# Patient Record
Sex: MALE | Race: WHITE | NOT HISPANIC OR LATINO
[De-identification: names, ages, dates, MRNs, and addresses within clinical notes are randomized per-mention and may not be internally consistent; named-entity substitution may affect disease eponyms.]

---

## 2017-01-10 ENCOUNTER — APPOINTMENT (OUTPATIENT)
Dept: FAMILY MEDICINE | Facility: CLINIC | Age: 62
End: 2017-01-10

## 2017-01-10 VITALS — DIASTOLIC BLOOD PRESSURE: 77 MMHG | TEMPERATURE: 97.7 F | SYSTOLIC BLOOD PRESSURE: 128 MMHG

## 2017-01-10 DIAGNOSIS — L29.8 OTHER PRURITUS: ICD-10-CM

## 2017-01-10 RX ORDER — TOLNAFTATE 10 MG/G
1 POWDER TOPICAL
Qty: 1 | Refills: 0 | Status: ACTIVE | COMMUNITY
Start: 2017-01-10 | End: 1900-01-01

## 2017-01-10 RX ORDER — EMOLLIENT COMBINATION NO.112
CREAM (GRAM) TOPICAL
Qty: 1 | Refills: 3 | Status: ACTIVE | COMMUNITY
Start: 2017-01-10 | End: 1900-01-01

## 2017-04-26 ENCOUNTER — EMERGENCY (EMERGENCY)
Facility: HOSPITAL | Age: 62
LOS: 1 days | Discharge: HOME | End: 2017-04-26
Attending: EMERGENCY MEDICINE
Payer: MEDICARE

## 2017-04-26 VITALS
TEMPERATURE: 99 F | HEIGHT: 69 IN | RESPIRATION RATE: 18 BRPM | HEART RATE: 71 BPM | OXYGEN SATURATION: 98 % | WEIGHT: 240.08 LBS | DIASTOLIC BLOOD PRESSURE: 58 MMHG | SYSTOLIC BLOOD PRESSURE: 97 MMHG

## 2017-04-26 VITALS
RESPIRATION RATE: 17 BRPM | HEART RATE: 68 BPM | DIASTOLIC BLOOD PRESSURE: 62 MMHG | OXYGEN SATURATION: 96 % | TEMPERATURE: 98 F | SYSTOLIC BLOOD PRESSURE: 101 MMHG

## 2017-04-26 DIAGNOSIS — F31.9 BIPOLAR DISORDER, UNSPECIFIED: ICD-10-CM

## 2017-04-26 DIAGNOSIS — Z79.4 LONG TERM (CURRENT) USE OF INSULIN: ICD-10-CM

## 2017-04-26 DIAGNOSIS — K59.00 CONSTIPATION, UNSPECIFIED: ICD-10-CM
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
## 2017-04-26 DIAGNOSIS — Z98.890 OTHER SPECIFIED POSTPROCEDURAL STATES: Chronic | ICD-10-CM

## 2017-04-26 DIAGNOSIS — S80.00XA CONTUSION OF UNSPECIFIED KNEE, INITIAL ENCOUNTER: ICD-10-CM
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
## 2017-04-26 DIAGNOSIS — E11.9 TYPE 2 DIABETES MELLITUS WITHOUT COMPLICATIONS: ICD-10-CM

## 2017-04-26 DIAGNOSIS — R10.9 UNSPECIFIED ABDOMINAL PAIN: ICD-10-CM

## 2017-04-26 PROCEDURE — 99284 EMERGENCY DEPT VISIT MOD MDM: CPT

## 2017-04-26 PROCEDURE — 74000: CPT | Mod: 26

## 2017-04-26 PROCEDURE — 73562 X-RAY EXAM OF KNEE 3: CPT | Mod: 26,RT

## 2017-04-26 RX ORDER — POLYETHYLENE GLYCOL 3350 17 G/17G
17 POWDER, FOR SOLUTION ORAL
Qty: 119 | Refills: 0
Start: 2017-04-26 | End: 2017-05-03

## 2017-04-26 RX ADMIN — Medication 500 MILLIGRAM(S): at 15:05

## 2017-04-26 NOTE — ED PROVIDER NOTE - MEDICAL DECISION MAKING DETAILS
pt noted constipation on XR abd to dc with follow up with PMD in 2-3 days, given miralax. Contusion of knee likely but neg exam.

## 2017-04-26 NOTE — ED ADULT NURSE NOTE - OBJECTIVE STATEMENT
received pt from [facility] fall 2 years ago c/o pain to the rt knee need to see an orthopedaic doctor

## 2017-04-26 NOTE — ED PROVIDER NOTE - OBJECTIVE STATEMENT
61 year old male without significant PMH presenting to ED due to 61 year old male without significant PMH presenting to ED due to near fall with R knee hitting ground while at [employer], pt also states he has been constipated without abdominal pain but states has not had significant nausea/vomiting or pain. R knee painful after fall otherwise no LOC no head trauma.

## 2017-06-02 ENCOUNTER — RX RENEWAL (OUTPATIENT)
Age: 62
End: 2017-06-02

## 2017-06-05 ENCOUNTER — APPOINTMENT (OUTPATIENT)
Dept: FAMILY MEDICINE | Facility: CLINIC | Age: 62
End: 2017-06-05

## 2017-06-05 VITALS — TEMPERATURE: 98.3 F | DIASTOLIC BLOOD PRESSURE: 76 MMHG | SYSTOLIC BLOOD PRESSURE: 110 MMHG

## 2017-07-12 ENCOUNTER — EMERGENCY (EMERGENCY)
Facility: HOSPITAL | Age: 62
LOS: 1 days | Discharge: HOME | End: 2017-07-12
Attending: STUDENT IN AN ORGANIZED HEALTH CARE EDUCATION/TRAINING PROGRAM | Admitting: INTERNAL MEDICINE
Payer: MEDICARE

## 2017-07-12 VITALS
TEMPERATURE: 97 F | HEART RATE: 75 BPM | OXYGEN SATURATION: 99 % | SYSTOLIC BLOOD PRESSURE: 118 MMHG | RESPIRATION RATE: 18 BRPM | DIASTOLIC BLOOD PRESSURE: 74 MMHG

## 2017-07-12 DIAGNOSIS — F31.9 BIPOLAR DISORDER, UNSPECIFIED: ICD-10-CM

## 2017-07-12 DIAGNOSIS — E11.9 TYPE 2 DIABETES MELLITUS WITHOUT COMPLICATIONS: ICD-10-CM

## 2017-07-12 DIAGNOSIS — R00.2 PALPITATIONS: ICD-10-CM

## 2017-07-12 DIAGNOSIS — R47.81 SLURRED SPEECH: ICD-10-CM

## 2017-07-12 DIAGNOSIS — G45.9 TRANSIENT CEREBRAL ISCHEMIC ATTACK, UNSPECIFIED: ICD-10-CM

## 2017-07-12 DIAGNOSIS — I63.9 CEREBRAL INFARCTION, UNSPECIFIED: ICD-10-CM

## 2017-07-12 DIAGNOSIS — Z98.890 OTHER SPECIFIED POSTPROCEDURAL STATES: Chronic | ICD-10-CM

## 2017-07-12 LAB
ALBUMIN SERPL ELPH-MCNC: 3.2 G/DL — LOW (ref 3.3–5)
ALP SERPL-CCNC: 75 U/L — SIGNIFICANT CHANGE UP (ref 40–120)
ALT FLD-CCNC: 20 U/L — SIGNIFICANT CHANGE UP (ref 12–78)
ANION GAP SERPL CALC-SCNC: 6 MMOL/L — SIGNIFICANT CHANGE UP (ref 5–17)
APTT BLD: 32.9 SEC — SIGNIFICANT CHANGE UP (ref 27.5–37.4)
AST SERPL-CCNC: 9 U/L — LOW (ref 15–37)
BASOPHILS # BLD AUTO: 0.1 K/UL — SIGNIFICANT CHANGE UP (ref 0–0.2)
BASOPHILS NFR BLD AUTO: 0.5 % — SIGNIFICANT CHANGE UP (ref 0–2)
BILIRUB SERPL-MCNC: 0.4 MG/DL — SIGNIFICANT CHANGE UP (ref 0.2–1.2)
BUN SERPL-MCNC: 16 MG/DL — SIGNIFICANT CHANGE UP (ref 7–23)
CALCIUM SERPL-MCNC: 8.9 MG/DL — SIGNIFICANT CHANGE UP (ref 8.5–10.1)
CHLORIDE SERPL-SCNC: 109 MMOL/L — HIGH (ref 96–108)
CK MB BLD-MCNC: 3.8 % — HIGH (ref 0–3.5)
CK MB CFR SERPL CALC: 1.4 NG/ML — SIGNIFICANT CHANGE UP (ref 0.5–3.6)
CK SERPL-CCNC: 37 U/L — SIGNIFICANT CHANGE UP (ref 26–308)
CO2 SERPL-SCNC: 27 MMOL/L — SIGNIFICANT CHANGE UP (ref 22–31)
CREAT SERPL-MCNC: 1.05 MG/DL — SIGNIFICANT CHANGE UP (ref 0.5–1.3)
EOSINOPHIL # BLD AUTO: 0.4 K/UL — SIGNIFICANT CHANGE UP (ref 0–0.5)
EOSINOPHIL NFR BLD AUTO: 3.4 % — SIGNIFICANT CHANGE UP (ref 0–6)
GLUCOSE SERPL-MCNC: 83 MG/DL — SIGNIFICANT CHANGE UP (ref 70–99)
HCT VFR BLD CALC: 39.5 % — SIGNIFICANT CHANGE UP (ref 39–50)
HGB BLD-MCNC: 14.2 G/DL — SIGNIFICANT CHANGE UP (ref 13–17)
INR BLD: 1.24 RATIO — HIGH (ref 0.88–1.16)
LACTATE SERPL-SCNC: 1.5 MMOL/L — SIGNIFICANT CHANGE UP (ref 0.7–2)
LYMPHOCYTES # BLD AUTO: 2.5 K/UL — SIGNIFICANT CHANGE UP (ref 1–3.3)
LYMPHOCYTES # BLD AUTO: 24.5 % — SIGNIFICANT CHANGE UP (ref 13–44)
MCHC RBC-ENTMCNC: 29.8 PG — SIGNIFICANT CHANGE UP (ref 27–34)
MCHC RBC-ENTMCNC: 35.8 GM/DL — SIGNIFICANT CHANGE UP (ref 32–36)
MCV RBC AUTO: 83.1 FL — SIGNIFICANT CHANGE UP (ref 80–100)
MONOCYTES # BLD AUTO: 0.7 K/UL — SIGNIFICANT CHANGE UP (ref 0–0.9)
MONOCYTES NFR BLD AUTO: 6.7 % — SIGNIFICANT CHANGE UP (ref 2–14)
NEUTROPHILS # BLD AUTO: 6.7 K/UL — SIGNIFICANT CHANGE UP (ref 1.8–7.4)
NEUTROPHILS NFR BLD AUTO: 64.8 % — SIGNIFICANT CHANGE UP (ref 43–77)
PLATELET # BLD AUTO: 245 K/UL — SIGNIFICANT CHANGE UP (ref 150–400)
POTASSIUM SERPL-MCNC: 3.9 MMOL/L — SIGNIFICANT CHANGE UP (ref 3.5–5.3)
POTASSIUM SERPL-SCNC: 3.9 MMOL/L — SIGNIFICANT CHANGE UP (ref 3.5–5.3)
PROT SERPL-MCNC: 7.2 GM/DL — SIGNIFICANT CHANGE UP (ref 6–8.3)
PROTHROM AB SERPL-ACNC: 13.6 SEC — HIGH (ref 9.8–12.7)
RBC # BLD: 4.76 M/UL — SIGNIFICANT CHANGE UP (ref 4.2–5.8)
RBC # FLD: 13.1 % — SIGNIFICANT CHANGE UP (ref 11–15)
SODIUM SERPL-SCNC: 142 MMOL/L — SIGNIFICANT CHANGE UP (ref 135–145)
TROPONIN I SERPL-MCNC: <.015 NG/ML — SIGNIFICANT CHANGE UP (ref 0.01–0.04)
WBC # BLD: 10.4 K/UL — SIGNIFICANT CHANGE UP (ref 3.8–10.5)
WBC # FLD AUTO: 10.4 K/UL — SIGNIFICANT CHANGE UP (ref 3.8–10.5)

## 2017-07-12 PROCEDURE — 70450 CT HEAD/BRAIN W/O DYE: CPT | Mod: 26

## 2017-07-12 PROCEDURE — 99222 1ST HOSP IP/OBS MODERATE 55: CPT | Mod: AI

## 2017-07-12 PROCEDURE — 71010: CPT | Mod: 26

## 2017-07-12 PROCEDURE — 99285 EMERGENCY DEPT VISIT HI MDM: CPT

## 2017-07-12 RX ORDER — SODIUM CHLORIDE 9 MG/ML
500 INJECTION INTRAMUSCULAR; INTRAVENOUS; SUBCUTANEOUS ONCE
Qty: 0 | Refills: 0 | Status: COMPLETED | OUTPATIENT
Start: 2017-07-12 | End: 2017-07-12

## 2017-07-12 RX ORDER — ENOXAPARIN SODIUM 100 MG/ML
40 INJECTION SUBCUTANEOUS DAILY
Qty: 0 | Refills: 0 | Status: DISCONTINUED | OUTPATIENT
Start: 2017-07-12 | End: 2017-07-13

## 2017-07-12 RX ORDER — POLYETHYLENE GLYCOL 3350 17 G/17G
17 POWDER, FOR SOLUTION ORAL DAILY
Qty: 0 | Refills: 0 | Status: DISCONTINUED | OUTPATIENT
Start: 2017-07-12 | End: 2017-07-13

## 2017-07-12 RX ORDER — INSULIN GLARGINE 100 [IU]/ML
20 INJECTION, SOLUTION SUBCUTANEOUS AT BEDTIME
Qty: 0 | Refills: 0 | Status: DISCONTINUED | OUTPATIENT
Start: 2017-07-12 | End: 2017-07-13

## 2017-07-12 RX ORDER — INSULIN LISPRO 100/ML
VIAL (ML) SUBCUTANEOUS
Qty: 0 | Refills: 0 | Status: DISCONTINUED | OUTPATIENT
Start: 2017-07-12 | End: 2017-07-13

## 2017-07-12 RX ORDER — ASPIRIN/CALCIUM CARB/MAGNESIUM 324 MG
81 TABLET ORAL DAILY
Qty: 0 | Refills: 0 | Status: DISCONTINUED | OUTPATIENT
Start: 2017-07-12 | End: 2017-07-13

## 2017-07-12 RX ORDER — DEXTROSE 50 % IN WATER 50 %
12.5 SYRINGE (ML) INTRAVENOUS ONCE
Qty: 0 | Refills: 0 | Status: DISCONTINUED | OUTPATIENT
Start: 2017-07-12 | End: 2017-07-13

## 2017-07-12 RX ORDER — QUETIAPINE FUMARATE 200 MG/1
300 TABLET, FILM COATED ORAL AT BEDTIME
Qty: 0 | Refills: 0 | Status: DISCONTINUED | OUTPATIENT
Start: 2017-07-12 | End: 2017-07-13

## 2017-07-12 RX ORDER — DEXTROSE 50 % IN WATER 50 %
25 SYRINGE (ML) INTRAVENOUS ONCE
Qty: 0 | Refills: 0 | Status: DISCONTINUED | OUTPATIENT
Start: 2017-07-12 | End: 2017-07-13

## 2017-07-12 RX ORDER — DEXTROSE 50 % IN WATER 50 %
1 SYRINGE (ML) INTRAVENOUS ONCE
Qty: 0 | Refills: 0 | Status: DISCONTINUED | OUTPATIENT
Start: 2017-07-12 | End: 2017-07-13

## 2017-07-12 RX ORDER — GLUCAGON INJECTION, SOLUTION 0.5 MG/.1ML
1 INJECTION, SOLUTION SUBCUTANEOUS ONCE
Qty: 0 | Refills: 0 | Status: DISCONTINUED | OUTPATIENT
Start: 2017-07-12 | End: 2017-07-13

## 2017-07-12 RX ORDER — LITHIUM CARBONATE 300 MG/1
600 TABLET, EXTENDED RELEASE ORAL DAILY
Qty: 0 | Refills: 0 | Status: DISCONTINUED | OUTPATIENT
Start: 2017-07-12 | End: 2017-07-13

## 2017-07-12 RX ORDER — SODIUM CHLORIDE 9 MG/ML
1000 INJECTION, SOLUTION INTRAVENOUS
Qty: 0 | Refills: 0 | Status: DISCONTINUED | OUTPATIENT
Start: 2017-07-12 | End: 2017-07-13

## 2017-07-12 RX ORDER — INSULIN LISPRO 100/ML
VIAL (ML) SUBCUTANEOUS AT BEDTIME
Qty: 0 | Refills: 0 | Status: DISCONTINUED | OUTPATIENT
Start: 2017-07-12 | End: 2017-07-13

## 2017-07-12 RX ORDER — HALOPERIDOL DECANOATE 100 MG/ML
10 INJECTION INTRAMUSCULAR DAILY
Qty: 0 | Refills: 0 | Status: DISCONTINUED | OUTPATIENT
Start: 2017-07-12 | End: 2017-07-13

## 2017-07-12 RX ADMIN — QUETIAPINE FUMARATE 300 MILLIGRAM(S): 200 TABLET, FILM COATED ORAL at 22:00

## 2017-07-12 RX ADMIN — SODIUM CHLORIDE 1000 MILLILITER(S): 9 INJECTION INTRAMUSCULAR; INTRAVENOUS; SUBCUTANEOUS at 15:00

## 2017-07-12 NOTE — DISCHARGE NOTE ADULT - NS AS DC STROKE ED MATERIALS
Risk Factors for Stroke/Prescribed Medications/Stroke Education Booklet/Need for Followup After Discharge/Stroke Warning Signs and Symptoms/Call 911 for Stroke

## 2017-07-12 NOTE — DISCHARGE NOTE ADULT - SECONDARY DIAGNOSIS.
Type 2 diabetes mellitus without complication, with long-term current use of insulin Bipolar affective disorder

## 2017-07-12 NOTE — ED ADULT TRIAGE NOTE - CHIEF COMPLAINT QUOTE
pt sent form [facility] day care for slurred speech and palpitations. last seen normal 1325. pt has history of dm, and schizophrenia pt sent form [facility] day care for slurred speech and palpitations. last seen normal 1325. pt has history of dm, and schizophrenia.

## 2017-07-12 NOTE — ED ADULT NURSE NOTE - CHIEF COMPLAINT QUOTE
pt sent form [facility] day care for slurred speech and palpitations. last seen normal 1325. pt has history of dm, and schizophrenia.

## 2017-07-12 NOTE — ED ADULT NURSE REASSESSMENT NOTE - NS ED NURSE REASSESS COMMENT FT1
pt. speech improving, pt asking for mother, pt asked to call mother, I called his sister at [facility].. pt aware.
Dr. Marino at bedside, pt denies pain. Dr. Marino aware of pts bp

## 2017-07-12 NOTE — H&P ADULT - NSHPREVIEWOFSYSTEMS_GEN_ALL_CORE

## 2017-07-12 NOTE — DISCHARGE NOTE ADULT - CARE PLAN
Principal Discharge DX:	Transient cerebral ischemia, unspecified type  Goal:	resolved  Instructions for follow-up, activity and diet:	Follow up with your physician  Secondary Diagnosis:	Type 2 diabetes mellitus without complication, with long-term current use of insulin  Secondary Diagnosis:	Bipolar affective disorder

## 2017-07-12 NOTE — ED PROVIDER NOTE - MEDICAL DECISION MAKING DETAILS
labs, ekg, xray chest and ct head, pt was not a tpa candidate due to time of onset, symptoms also improved

## 2017-07-12 NOTE — DISCHARGE NOTE ADULT - PATIENT PORTAL LINK FT
“You can access the [facility] Patient Portal, offered by [facility], by registering with the following website: [email or web address]”

## 2017-07-12 NOTE — H&P ADULT - HISTORY OF PRESENT ILLNESS
this is a 61 years old male with history of type 2 diabetes and bipolar disorder complain of  slurred speech and left upper extremity weakness  for one day - patient poor historian and changes his story often .

## 2017-07-12 NOTE — H&P ADULT - NSHPLABSRESULTS_GEN_ALL_CORE
14.2   10.4  )-----------( 245      ( 12 Jul 2017 14:43 )             39.5   07-12    142  |  109<H>  |  16  ----------------------------<  83  3.9   |  27  |  1.05    Ca    8.9      12 Jul 2017 14:43    TPro  7.2  /  Alb  3.2<L>  /  TBili  0.4  /  DBili  x   /  AST  9<L>  /  ALT  20  /  AlkPhos  75  07-12  < from: CT Head No Cont (07.12.17 @ 14:21) >    IMPRESSION:    1)  no acute infarct or hemorrhagic lesion identified. Follow-up MR   imaging may be considered for further assessment. No intraluminal   thrombus appreciated.  2)  clear sinuses and mastoids    < from: Xray Chest 1 View AP/PA. (07.12.17 @ 14:25) >    MPRESSION:    Cardiomegaly with congestive changes.      < end of copied text >

## 2017-07-12 NOTE — ED PROVIDER NOTE - PROGRESS NOTE DETAILS
dr destini hawkins, made aware, [facility] day [facility] called to get more information of the time of onset spoke to the nurse in [facility] day care, pt was last seen normal by her at 11:30am, she went to go check up on him again at 1:25pm and noticed his speech was slurred pt re-assessed, pt's speech is clear and moving all extremities, pt is not a candiate for tpa

## 2017-07-12 NOTE — ED PROVIDER NOTE - OBJECTIVE STATEMENT
61 year old male with 61 year old male with h/o diabetes and bipolar disorder presents today sent in from [facility] day program for evaluation of stroke symptoms, pt was last seen normal by his nurse at 11:30am, he was then seen by his nurse again at 1:25pm and found to have slurring of his speech, pt c/o feeling palpitations (cardiac monitor in the ER showed a heart rate in triage of 74 bpm), he denies chest pain (-) headache (-) nausea or vomiting (-) dizzy or lightheaded (-) visual disturbances

## 2017-07-12 NOTE — DISCHARGE NOTE ADULT - MEDICATION SUMMARY - MEDICATIONS TO TAKE
I will START or STAY ON the medications listed below when I get home from the hospital:    naproxen 375 mg oral tablet  -- 1 tab(s) by mouth 2 times a day  -- Check with your doctor before becoming pregnant.  It is very important that you take or use this exactly as directed.  Do not skip doses or discontinue unless directed by your doctor.  May cause drowsiness or dizziness.  Obtain medical advice before taking any non-prescription drugs as some may affect the action of this medication.  Take with food or milk.    -- Indication: For arthritis    aspirin 81 mg oral delayed release tablet  -- 1 tab(s) by mouth once a day  -- Indication: For TIA (transient ischemic attack)    Lantus 100 units/mL subcutaneous solution  --  subcutaneous   -- Indication: For DM2 (diabetes mellitus, type 2)    lithium 600 mg oral capsule  --  by mouth once a day  -- Indication: For Bipolar 1 disorder    SEROquel 300 mg oral tablet  --  by mouth once a day  -- Indication: For Bipolar 1 disorder    haloperidol 10 mg oral tablet  -- 1 tab(s) by mouth once a day  -- Indication: For Bipolar 1 disorder    MiraLax - oral powder for reconstitution  -- 17 gram(s) by mouth once a day  -- Dilute this medication with liquid before administration.  It is very important that you take or use this exactly as directed.  Do not skip doses or discontinue unless directed by your doctor.    -- Indication: For Constipation I will START or STAY ON the medications listed below when I get home from the hospital:    naproxen 375 mg oral tablet  -- 1 tab(s) by mouth 2 times a day  -- Check with your doctor before becoming pregnant.  It is very important that you take or use this exactly as directed.  Do not skip doses or discontinue unless directed by your doctor.  May cause drowsiness or dizziness.  Obtain medical advice before taking any non-prescription drugs as some may affect the action of this medication.  Take with food or milk.    -- Indication: For arthritis    aspirin 81 mg oral delayed release tablet  -- 1 tab(s) by mouth once a day  -- Indication: For TIA (transient ischemic attack)    Lantus 100 units/mL subcutaneous solution  --  subcutaneous   -- Indication: For DM2 (diabetes mellitus, type 2)    simvastatin 40 mg oral tablet  -- 1 tab(s) by mouth once a day (at bedtime)  -- Avoid grapefruit and grapefruit juice while taking this medication.  Do not take this drug if you are pregnant.  It is very important that you take or use this exactly as directed.  Do not skip doses or discontinue unless directed by your doctor.  Obtain medical advice before taking any non-prescription drugs as some may affect the action of this medication.  Take with food or milk.    -- Indication: For Transient cerebral ischemia, unspecified type    haloperidol 10 mg oral tablet  -- 1 tab(s) by mouth once a day  -- Indication: For Bipolar 1 disorder    lithium 600 mg oral capsule  --  by mouth once a day  -- Indication: For Bipolar 1 disorder    SEROquel 300 mg oral tablet  --  by mouth once a day  -- Indication: For Bipolar 1 disorder    MiraLax - oral powder for reconstitution  -- 17 gram(s) by mouth once a day  -- Dilute this medication with liquid before administration.  It is very important that you take or use this exactly as directed.  Do not skip doses or discontinue unless directed by your doctor.    -- Indication: For Constipation

## 2017-07-12 NOTE — H&P ADULT - NSHPPHYSICALEXAM_GEN_ALL_CORE
GENERAL: NAD well-developed  HEAD:  Atraumatic, Normocephalic  EYES: EOMI, PERRLA, conjunctiva and sclera clear  ENMT: No tonsillar erythema, exudates, or enlargement; Moist mucous membranes, Good dentition, No lesions  NECK: Supple, No JVD, Normal thyroid  NERVOUS SYSTEM:  Alert & Oriented X3,slurred speech with lue 3/5 weakness   CHEST/LUNG: Clear to percussion bilaterally; No rales, rhonchi, wheezing, or rubs  HEART: Regular rate and rhythm; No murmurs, rubs, or gallops  ABDOMEN: Soft, Nontender, Nondistended; Bowel sounds present  EXTREMITIES:  2+ Peripheral Pulses, No clubbing, cyanosis, or edema  LYMPH: No lymphadenopathy   SKIN: No rashes or lesions

## 2017-07-12 NOTE — DISCHARGE NOTE ADULT - HOSPITAL COURSE
his is a 61 years old male with history of type 2 diabetes and bipolar disorder complain of  slurred speech and left upper extremity weakness  for one day -pt  had a head ct and mri which were both NEGATIVE . patient had a carotid doppler which was within normal limit . patients clinical status went back to baseline so as all his tests were within normal limit we just added aspirin and a statin to his regimen for his tia

## 2017-07-13 VITALS
OXYGEN SATURATION: 99 % | RESPIRATION RATE: 18 BRPM | DIASTOLIC BLOOD PRESSURE: 86 MMHG | TEMPERATURE: 99 F | SYSTOLIC BLOOD PRESSURE: 115 MMHG | HEART RATE: 78 BPM

## 2017-07-13 LAB
CHOLEST SERPL-MCNC: 176 MG/DL — SIGNIFICANT CHANGE UP (ref 10–199)
HDLC SERPL-MCNC: 46 MG/DL — SIGNIFICANT CHANGE UP (ref 40–125)
LIPID PNL WITH DIRECT LDL SERPL: 106 MG/DL — SIGNIFICANT CHANGE UP
LITHIUM SERPL-MCNC: 0.7 MMOL/L — SIGNIFICANT CHANGE UP (ref 0.6–1.2)
TOTAL CHOLESTEROL/HDL RATIO MEASUREMENT: 3.8 RATIO — SIGNIFICANT CHANGE UP (ref 3.4–9.6)
TRIGL SERPL-MCNC: 119 MG/DL — SIGNIFICANT CHANGE UP (ref 10–149)

## 2017-07-13 PROCEDURE — 93306 TTE W/DOPPLER COMPLETE: CPT | Mod: 26

## 2017-07-13 PROCEDURE — 93880 EXTRACRANIAL BILAT STUDY: CPT | Mod: 26

## 2017-07-13 PROCEDURE — 93010 ELECTROCARDIOGRAM REPORT: CPT

## 2017-07-13 PROCEDURE — 70551 MRI BRAIN STEM W/O DYE: CPT | Mod: 26

## 2017-07-13 PROCEDURE — 99238 HOSP IP/OBS DSCHRG MGMT 30/<: CPT

## 2017-07-13 RX ORDER — ASPIRIN/CALCIUM CARB/MAGNESIUM 324 MG
1 TABLET ORAL
Qty: 0 | Refills: 0 | DISCHARGE
Start: 2017-07-13

## 2017-07-13 RX ORDER — HALOPERIDOL DECANOATE 100 MG/ML
1 INJECTION INTRAMUSCULAR
Qty: 0 | Refills: 0 | COMMUNITY
Start: 2017-07-13

## 2017-07-13 RX ORDER — SIMVASTATIN 20 MG/1
1 TABLET, FILM COATED ORAL
Qty: 30 | Refills: 0 | OUTPATIENT
Start: 2017-07-13 | End: 2017-08-12

## 2017-07-13 RX ADMIN — Medication 81 MILLIGRAM(S): at 12:35

## 2017-07-13 RX ADMIN — POLYETHYLENE GLYCOL 3350 17 GRAM(S): 17 POWDER, FOR SOLUTION ORAL at 12:34

## 2017-07-13 RX ADMIN — LITHIUM CARBONATE 600 MILLIGRAM(S): 300 TABLET, EXTENDED RELEASE ORAL at 12:35

## 2017-07-13 RX ADMIN — ENOXAPARIN SODIUM 40 MILLIGRAM(S): 100 INJECTION SUBCUTANEOUS at 12:35

## 2017-07-13 NOTE — OCCUPATIONAL THERAPY INITIAL EVALUATION ADULT - PLANNED THERAPY INTERVENTIONS, OT EVAL
balance training/cognitive, visual perceptual/fine motor coordination training/neuromuscular re-education/energy conservation techniques/motor coordination training/parent/caregiver training.../stretching/transfer training/IADL retraining/bed mobility training/strengthening

## 2017-07-13 NOTE — OCCUPATIONAL THERAPY INITIAL EVALUATION ADULT - GENERAL OBSERVATIONS, REHAB EVAL
Pt was encountered sitting at the edge of   bed, AA&Ox4, cooperative & followed commands. Pt moves all extremities without difficulty and incooperate both sides of his body; pt performed bed mobility, upper body ADL and transfer; pt ambulated with supervision to bathroom for toileting tasks without any adaptive device with good- balance; Dexterity and fine motor skills are diminished in both hand due to tremors Pt was encountered sitting at the edge of   bed, AA&Ox4, cooperative & followed commands. Pt moves all extremities without difficulty and incooperate both sides of his body; pt performed bed mobility, upper body ADL and transfer; pt ambulated 25 ft x2 with supervision to bathroom for toileting tasks without any adaptive device with good- balance; Dexterity and fine motor skills are diminished in both hands due to tremors

## 2017-07-13 NOTE — PROGRESS NOTE ADULT - SUBJECTIVE AND OBJECTIVE BOX
CHIEF COMPLAINT/INTERVAL HISTORY:    Patient is a 61y old  Male who presents with a chief complaint of Pt stated that his heart was beating hard. (12 Jul 2017 23:58)      HPI:  this is a 61 years old male with history of type 2 diabetes and bipolar disorder complain of  slurred speech and left upper extremity weakness  for one day - patient poor historian and changes his story often . (12 Jul 2017 19:36)    Overnight issues  all symptoms resolved   complain of constipation   SUBJECTIVE & OBJECTIVE: Pt seen and examined at bedside.   ROS:  CONSTITUTIONAL: No fever, weight loss, or fatigue  NECK: No pain or stiffness  RESPIRATORY: No cough, wheezing, chills or hemoptysis; No shortness of breath  CARDIOVASCULAR: No chest pain, palpitations, dizziness, or leg swelling  GASTROINTESTINAL: No abdominal or epigastric pain. No nausea, vomiting, or hematemesis; No diarrhea or constipation. No melena or hematochezia.  GENITOURINARY: No dysuria, frequency, hematuria, or incontinence  NEUROLOGICAL: No headaches, memory loss, loss of strength, numbness, or tremors  SKIN: No itching, burning, rashes, or lesions   ICU Vital Signs Last 24 Hrs  T(C): 37.1 (13 Jul 2017 17:20), Max: 37.1 (13 Jul 2017 17:20)  T(F): 98.8 (13 Jul 2017 17:20), Max: 98.8 (13 Jul 2017 17:20)  HR: 78 (13 Jul 2017 17:20) (61 - 83)  BP: 115/86 (13 Jul 2017 17:20) (96/56 - 120/75)  BP(mean): --  ABP: --  ABP(mean): --  RR: 18 (13 Jul 2017 17:20) (16 - 18)  SpO2: 99% (13 Jul 2017 17:20) (97% - 100%)        MEDICATIONS  (STANDING):  haloperidol     Tablet 10 milliGRAM(s) Oral daily  lithium 600 milliGRAM(s) Oral daily  QUEtiapine 300 milliGRAM(s) Oral at bedtime  polyethylene glycol 3350 17 Gram(s) Oral daily  insulin glargine Injectable (LANTUS) 20 Unit(s) SubCutaneous at bedtime  insulin lispro (HumaLOG) corrective regimen sliding scale   SubCutaneous three times a day before meals  insulin lispro (HumaLOG) corrective regimen sliding scale   SubCutaneous at bedtime  dextrose 5%. 1000 milliLiter(s) (50 mL/Hr) IV Continuous <Continuous>  dextrose 50% Injectable 12.5 Gram(s) IV Push once  dextrose 50% Injectable 25 Gram(s) IV Push once  dextrose 50% Injectable 25 Gram(s) IV Push once  aspirin enteric coated 81 milliGRAM(s) Oral daily  enoxaparin Injectable 40 milliGRAM(s) SubCutaneous daily    MEDICATIONS  (PRN):  dextrose Gel 1 Dose(s) Oral once PRN Blood Glucose LESS THAN 70 milliGRAM(s)/deciliter  glucagon  Injectable 1 milliGRAM(s) IntraMuscular once PRN Glucose LESS THAN 70 milligrams/deciliter        PHYSICAL EXAM:    GENERAL: NAD, well-groomed, well-developed  HEAD:  Atraumatic, Normocephalic  EYES: EOMI, PERRLA, conjunctiva and sclera clear  ENMT: Moist mucous membranes  NECK: Supple, No JVD  NERVOUS SYSTEM:  Alert & Oriented X3, Motor Strength 5/5 B/L upper and lower extremities; DTRs 2+ intact and symmetric  CHEST/LUNG: Clear to auscultation bilaterally; No rales, rhonchi, wheezing, or rubs  HEART: Regular rate and rhythm; No murmurs, rubs, or gallops  ABDOMEN: Soft, Nontender, Nondistended; Bowel sounds present  EXTREMITIES:  2+ Peripheral Pulses, No clubbing, cyanosis, or edema    LABS:                        14.2   10.4  )-----------( 245      ( 12 Jul 2017 14:43 )             39.5     07-12    142  |  109<H>  |  16  ----------------------------<  83  3.9   |  27  |  1.05    Ca    8.9      12 Jul 2017 14:43    TPro  7.2  /  Alb  3.2<L>  /  TBili  0.4  /  DBili  x   /  AST  9<L>  /  ALT  20  /  AlkPhos  75  07-12    PT/INR - ( 12 Jul 2017 14:43 )   PT: 13.6 sec;   INR: 1.24 ratio         PTT - ( 12 Jul 2017 14:43 )  PTT:32.9 sec      CAPILLARY BLOOD GLUCOSE  107 (13 Jul 2017 16:28)  113 (13 Jul 2017 11:29)  99 (13 Jul 2017 07:39)  109 (12 Jul 2017 21:14)          RECENT CULTURES:      RADIOLOGY & ADDITIONAL TESTS:  Imaging Personally Reviewed:  [ ] YES      Consultant(s) Notes Reviewed:  [ ] YES     Care Discussed with [ ] Consultants [X ] Patient [ ] Family  [x ]    [x ]  Other; RN  HEALTH ISSUES - PROBLEM Dx:  Bipolar 1 disorder: Bipolar 1 disorder  DM2 (diabetes mellitus, type 2): DM2 (diabetes mellitus, type 2)  Cerebrovascular accident (CVA), unspecified mechanism: Cerebrovascular accident (CVA), unspecified mechanism        DVT/GI ppx  Discussed with pt @ bedside

## 2017-07-13 NOTE — OCCUPATIONAL THERAPY INITIAL EVALUATION ADULT - LIVES WITH, PROFILE
his elderly mother grzegorz private house with 4 steps to enter  with bilateral hand raisl; pt bathroom has a walk in shower and is equipped with grab bars , shower chair , but no raised toilet seat his elderly mother in a private house with 4 steps to enter  with bilateral hand rails; pt bathroom has a walk in shower and is equipped with grab bars , shower chair , but no raised toilet seat

## 2017-07-13 NOTE — OCCUPATIONAL THERAPY INITIAL EVALUATION ADULT - PERTINENT HX OF CURRENT PROBLEM, REHAB EVAL
Pt presented to ER  due to acute onset of neurological deficit. Pt is diagnosed with TIA with slurred speech. MRI on 7/13/17 results confirm  no acute finding.

## 2017-07-13 NOTE — PHYSICAL THERAPY INITIAL EVALUATION ADULT - CRITERIA FOR SKILLED THERAPEUTIC INTERVENTIONS
NIHS Score = 0/42, no stroke symptoms. Patient appears in baseline functions on assessment of bed mobility, transfers and gait. No skilled Pt indicated./risk reduction/prevention/therapy frequency

## 2017-07-13 NOTE — PHYSICAL THERAPY INITIAL EVALUATION ADULT - GENERAL OBSERVATIONS, REHAB EVAL
Patient encountered supine in bed, vital signs as charted. AAOx4. Denies pain or shortness of breath at rest.

## 2017-07-13 NOTE — OCCUPATIONAL THERAPY INITIAL EVALUATION ADULT - ADDITIONAL COMMENTS
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  The scale below depicts a picture of the pt's current level of functioning. Barthel Index: Feeding Score__10____, Bathing Score__5____, Grooming Score___5__, Dressing Score___10__, Bowel Score__10___, Bladder Score__10____, Toilet Score_10____, Transfer Score___10___, Mobility Score___10__, Stairs Score___5__, Total Score__75/100___.

## 2017-07-13 NOTE — PHYSICAL THERAPY INITIAL EVALUATION ADULT - LEVEL OF CONSCIOUSNESS, REHAB EVAL
[facility] Cognitive Continuum Level 5 (Higher Level Cognitive Function): able to do complex problem solving but limited flexibility, insight, social behavior and endurance; susceptible to breakdwon of behavior outside of a structured setting; reserved cognitive functions in stressful situations; slow cognitive processing ./alert

## 2017-07-13 NOTE — PHYSICAL THERAPY INITIAL EVALUATION ADULT - MODIFIED CLINICAL TEST OF SENSORY INTEGRATION IN BALANCE TEST
Barthel Index: Feeding Score _10__, Bathing Score _0__, Grooming Score _10_, Dressing Score _10__, Bowels Score _10__, Bladder Score _10_, Toilet Score _10__, Transfers Score _15__, Mobility Score _15__, Stairs Score _5__,     Total Score _95__

## 2017-07-13 NOTE — PHYSICAL THERAPY INITIAL EVALUATION ADULT - PERTINENT HX OF CURRENT PROBLEM, REHAB EVAL
Patient came to Ed c report of slurred speech and left arm weakness. Chart reviewed and noted both CT and MRI head showing negative neurologic changes.

## 2017-07-13 NOTE — PHYSICAL THERAPY INITIAL EVALUATION ADULT - COORDINATION ASSESSED, REHAB EVAL
intact bilaterally on both upper and lower limbs/finger to nose/heel to shin finger to nose/heel to shin/intact bilaterally on both upper and lower limbs

## 2017-07-20 ENCOUNTER — APPOINTMENT (OUTPATIENT)
Dept: FAMILY MEDICINE | Facility: CLINIC | Age: 62
End: 2017-07-20

## 2017-07-20 VITALS — DIASTOLIC BLOOD PRESSURE: 62 MMHG | TEMPERATURE: 98.4 F | SYSTOLIC BLOOD PRESSURE: 112 MMHG

## 2017-07-20 DIAGNOSIS — L85.3 XEROSIS CUTIS: ICD-10-CM

## 2017-07-20 RX ORDER — HYDROCORTISONE 10 MG/G
1 CREAM TOPICAL
Qty: 1 | Refills: 3 | Status: ACTIVE | COMMUNITY
Start: 2017-01-10 | End: 1900-01-01

## 2017-07-24 ENCOUNTER — APPOINTMENT (OUTPATIENT)
Dept: FAMILY MEDICINE | Facility: CLINIC | Age: 62
End: 2017-07-24

## 2017-08-18 RX ORDER — HALOPERIDOL DECANOATE 100 MG/ML
1 INJECTION INTRAMUSCULAR
Qty: 30 | Refills: 0
Start: 2017-08-18 | End: 2017-09-17

## 2017-08-18 RX ORDER — QUETIAPINE FUMARATE 200 MG/1
400 TABLET, FILM COATED ORAL
Qty: 0 | Refills: 0 | DISCHARGE
Start: 2017-08-18 | End: 2017-09-17

## 2017-08-18 RX ORDER — LITHIUM CARBONATE 300 MG/1
1 TABLET, EXTENDED RELEASE ORAL
Qty: 30 | Refills: 0
Start: 2017-08-18 | End: 2017-09-17

## 2017-08-18 RX ORDER — QUETIAPINE FUMARATE 200 MG/1
1 TABLET, FILM COATED ORAL
Qty: 30 | Refills: 0
Start: 2017-08-18 | End: 2017-09-17

## 2017-08-18 RX ORDER — SIMVASTATIN 20 MG/1
1 TABLET, FILM COATED ORAL
Qty: 30 | Refills: 0
Start: 2017-08-18 | End: 2017-09-17

## 2017-09-19 ENCOUNTER — APPOINTMENT (OUTPATIENT)
Dept: UROLOGY | Facility: CLINIC | Age: 62
End: 2017-09-19
Payer: MEDICARE

## 2017-09-19 ENCOUNTER — APPOINTMENT (OUTPATIENT)
Dept: UROLOGY | Facility: CLINIC | Age: 62
End: 2017-09-19

## 2017-09-19 VITALS
DIASTOLIC BLOOD PRESSURE: 62 MMHG | HEART RATE: 88 BPM | BODY MASS INDEX: 36.83 KG/M2 | TEMPERATURE: 98 F | RESPIRATION RATE: 15 BRPM | HEIGHT: 68 IN | WEIGHT: 243 LBS | SYSTOLIC BLOOD PRESSURE: 112 MMHG

## 2017-09-19 DIAGNOSIS — N39.44 NOCTURNAL ENURESIS: ICD-10-CM

## 2017-09-19 DIAGNOSIS — G45.9 TRANSIENT CEREBRAL ISCHEMIC ATTACK, UNSPECIFIED: ICD-10-CM

## 2017-09-19 PROCEDURE — 51798 US URINE CAPACITY MEASURE: CPT

## 2017-09-19 PROCEDURE — 99203 OFFICE O/P NEW LOW 30 MIN: CPT

## 2017-10-09 PROBLEM — G45.9 TIA (TRANSIENT ISCHEMIC ATTACK): Status: ACTIVE | Noted: 2017-10-09

## 2017-11-09 ENCOUNTER — EMERGENCY (EMERGENCY)
Facility: HOSPITAL | Age: 62
LOS: 0 days | Discharge: HOME | End: 2017-11-09
Attending: EMERGENCY MEDICINE
Payer: MEDICARE

## 2017-11-09 VITALS
RESPIRATION RATE: 16 BRPM | TEMPERATURE: 98 F | SYSTOLIC BLOOD PRESSURE: 110 MMHG | OXYGEN SATURATION: 99 % | HEART RATE: 76 BPM | DIASTOLIC BLOOD PRESSURE: 70 MMHG

## 2017-11-09 VITALS
HEART RATE: 78 BPM | TEMPERATURE: 99 F | RESPIRATION RATE: 17 BRPM | HEIGHT: 68 IN | DIASTOLIC BLOOD PRESSURE: 74 MMHG | OXYGEN SATURATION: 99 % | WEIGHT: 244.93 LBS | SYSTOLIC BLOOD PRESSURE: 111 MMHG

## 2017-11-09 DIAGNOSIS — Z98.890 OTHER SPECIFIED POSTPROCEDURAL STATES: Chronic | ICD-10-CM

## 2017-11-09 PROCEDURE — 71010: CPT | Mod: 26

## 2017-11-09 PROCEDURE — 99283 EMERGENCY DEPT VISIT LOW MDM: CPT

## 2017-11-09 RX ORDER — AZITHROMYCIN 500 MG/1
500 TABLET, FILM COATED ORAL ONCE
Qty: 0 | Refills: 0 | Status: COMPLETED | OUTPATIENT
Start: 2017-11-09 | End: 2017-11-09

## 2017-11-09 RX ORDER — AZITHROMYCIN 500 MG/1
1 TABLET, FILM COATED ORAL
Qty: 5 | Refills: 0
Start: 2017-11-09 | End: 2017-11-14

## 2017-11-09 RX ADMIN — Medication 200 MILLIGRAM(S): at 11:35

## 2017-11-09 RX ADMIN — AZITHROMYCIN 500 MILLIGRAM(S): 500 TABLET, FILM COATED ORAL at 12:58

## 2017-11-09 NOTE — ED ADULT TRIAGE NOTE - CHIEF COMPLAINT QUOTE
Pt with productive cough with SOB since yesterday. Smoker . [facility]

## 2017-11-09 NOTE — ED ADULT NURSE NOTE - CHIEF COMPLAINT QUOTE
Pt with productive cough with SOB since yesterday. Smoker . [facility]

## 2017-11-09 NOTE — ED ADULT NURSE NOTE - OBJECTIVE STATEMENT
received er bed f c/o cough x 1 day non productive denies chest discomfort or sob denies fever/chills b/l rhonchi audible upon auscultation r lung fields with decreased bs noted no wheezing audible

## 2017-11-09 NOTE — ED PROVIDER NOTE - PROGRESS NOTE DETAILS
Results reported to patient--xr suspicious for infiltrate/CAP, will give danielle  Pt. reports feeling better after medicine  pt. agrees to f/u with primary care outpt.  pt. understands to return to ED if symptoms worsen; will d/c

## 2017-11-09 NOTE — ED PROVIDER NOTE - OBJECTIVE STATEMENT
61 yo M with cough for 2 days, from [facility] day care.  Pt. has no other complaints.  Feels congested.  ROS: negative for fever, headache, chest pain, shortness of breath, abd pain, nausea, vomiting, diarrhea, rash, paresthesia, and weakness.   PMH: negative; Meds: Denies; SH: Denies smoking/drinking/drug use

## 2017-11-10 DIAGNOSIS — R05 COUGH: ICD-10-CM

## 2017-11-10 DIAGNOSIS — R09.89 OTHER SPECIFIED SYMPTOMS AND SIGNS INVOLVING THE CIRCULATORY AND RESPIRATORY SYSTEMS: ICD-10-CM

## 2017-11-10 DIAGNOSIS — F31.9 BIPOLAR DISORDER, UNSPECIFIED: ICD-10-CM

## 2017-12-19 RX ORDER — LITHIUM CARBONATE 300 MG/1
0 TABLET, EXTENDED RELEASE ORAL
Qty: 0 | Refills: 0 | COMMUNITY

## 2017-12-19 RX ORDER — HALOPERIDOL DECANOATE 100 MG/ML
0 INJECTION INTRAMUSCULAR
Qty: 0 | Refills: 0 | COMMUNITY

## 2017-12-19 RX ORDER — QUETIAPINE FUMARATE 200 MG/1
0 TABLET, FILM COATED ORAL
Qty: 0 | Refills: 0 | COMMUNITY

## 2017-12-29 ENCOUNTER — NON-APPOINTMENT (OUTPATIENT)
Age: 62
End: 2017-12-29

## 2017-12-29 ENCOUNTER — APPOINTMENT (OUTPATIENT)
Dept: FAMILY MEDICINE | Facility: CLINIC | Age: 62
End: 2017-12-29
Payer: MEDICARE

## 2017-12-29 VITALS
HEART RATE: 89 BPM | SYSTOLIC BLOOD PRESSURE: 120 MMHG | WEIGHT: 252 LBS | RESPIRATION RATE: 16 BRPM | HEIGHT: 68 IN | OXYGEN SATURATION: 97 % | DIASTOLIC BLOOD PRESSURE: 80 MMHG | TEMPERATURE: 98 F | BODY MASS INDEX: 38.19 KG/M2

## 2017-12-29 DIAGNOSIS — R73.09 OTHER ABNORMAL GLUCOSE: ICD-10-CM

## 2017-12-29 PROCEDURE — G0439: CPT

## 2017-12-29 PROCEDURE — 36415 COLL VENOUS BLD VENIPUNCTURE: CPT

## 2018-02-22 LAB
ALBUMIN SERPL ELPH-MCNC: 3.8 G/DL
ALP BLD-CCNC: 86 U/L
ALT SERPL-CCNC: 19 U/L
ANION GAP SERPL CALC-SCNC: 17 MMOL/L
AST SERPL-CCNC: 12 U/L
BASOPHILS # BLD AUTO: 0.07 K/UL
BASOPHILS NFR BLD AUTO: 0.6 %
BILIRUB SERPL-MCNC: <0.2 MG/DL
BUN SERPL-MCNC: 29 MG/DL
CALCIUM SERPL-MCNC: 10.3 MG/DL
CHLORIDE SERPL-SCNC: 105 MMOL/L
CO2 SERPL-SCNC: 21 MMOL/L
CREAT SERPL-MCNC: 0.98 MG/DL
EOSINOPHIL # BLD AUTO: 0.5 K/UL
EOSINOPHIL NFR BLD AUTO: 4.6 %
ESTIMATED AVERAGE GLUCOSE: 131 MG/DL
GLUCOSE SERPL-MCNC: 105 MG/DL
HBA1C MFR BLD HPLC: 6.2 %
HCT VFR BLD CALC: 40 %
HGB BLD-MCNC: 13 G/DL
IMM GRANULOCYTES NFR BLD AUTO: 1.2 %
LITHIUM SERPL-SCNC: 0.28 MMOL/L
LYMPHOCYTES # BLD AUTO: 3.12 K/UL
LYMPHOCYTES NFR BLD AUTO: 28.9 %
MAN DIFF?: NORMAL
MCHC RBC-ENTMCNC: 27.8 PG
MCHC RBC-ENTMCNC: 32.5 GM/DL
MCV RBC AUTO: 85.7 FL
MONOCYTES # BLD AUTO: 1.01 K/UL
MONOCYTES NFR BLD AUTO: 9.4 %
NEUTROPHILS # BLD AUTO: 5.97 K/UL
NEUTROPHILS NFR BLD AUTO: 55.3 %
PLATELET # BLD AUTO: 317 K/UL
POTASSIUM SERPL-SCNC: 4.7 MMOL/L
PROT SERPL-MCNC: 7 G/DL
RBC # BLD: 4.67 M/UL
RBC # FLD: 15.2 %
SODIUM SERPL-SCNC: 143 MMOL/L
TSH SERPL-ACNC: 1.28 UIU/ML
WBC # FLD AUTO: 10.8 K/UL

## 2018-04-05 ENCOUNTER — APPOINTMENT (OUTPATIENT)
Dept: FAMILY MEDICINE | Facility: CLINIC | Age: 63
End: 2018-04-05
Payer: MEDICARE

## 2018-04-05 VITALS — SYSTOLIC BLOOD PRESSURE: 118 MMHG | TEMPERATURE: 98.3 F | DIASTOLIC BLOOD PRESSURE: 78 MMHG

## 2018-04-05 DIAGNOSIS — Z79.899 OTHER LONG TERM (CURRENT) DRUG THERAPY: ICD-10-CM

## 2018-04-05 PROCEDURE — 36415 COLL VENOUS BLD VENIPUNCTURE: CPT

## 2018-04-05 PROCEDURE — 99214 OFFICE O/P EST MOD 30 MIN: CPT | Mod: 25

## 2018-04-06 ENCOUNTER — RX RENEWAL (OUTPATIENT)
Age: 63
End: 2018-04-06

## 2018-04-12 LAB
ALBUMIN SERPL ELPH-MCNC: 4 G/DL
ALP BLD-CCNC: 73 U/L
ALT SERPL-CCNC: 22 U/L
ANION GAP SERPL CALC-SCNC: 14 MMOL/L
AST SERPL-CCNC: 15 U/L
BASOPHILS # BLD AUTO: 0.04 K/UL
BASOPHILS NFR BLD AUTO: 0.3 %
BILIRUB SERPL-MCNC: 0.2 MG/DL
BUN SERPL-MCNC: 37 MG/DL
CALCIUM SERPL-MCNC: 9.6 MG/DL
CHLORIDE SERPL-SCNC: 105 MMOL/L
CHOLEST SERPL-MCNC: 179 MG/DL
CHOLEST/HDLC SERPL: 3.1 RATIO
CO2 SERPL-SCNC: 20 MMOL/L
CREAT SERPL-MCNC: 0.97 MG/DL
EOSINOPHIL # BLD AUTO: 0.54 K/UL
EOSINOPHIL NFR BLD AUTO: 4.5 %
GLUCOSE SERPL-MCNC: 163 MG/DL
HBA1C MFR BLD HPLC: 6 %
HCT VFR BLD CALC: 39.2 %
HDLC SERPL-MCNC: 58 MG/DL
HGB BLD-MCNC: 13.3 G/DL
IMM GRANULOCYTES NFR BLD AUTO: 0.4 %
LDLC SERPL CALC-MCNC: 92 MG/DL
LITHIUM SERPL-SCNC: 0.29 MMOL/L
LYMPHOCYTES # BLD AUTO: 3.58 K/UL
LYMPHOCYTES NFR BLD AUTO: 29.9 %
MAN DIFF?: NORMAL
MCHC RBC-ENTMCNC: 28.7 PG
MCHC RBC-ENTMCNC: 33.9 GM/DL
MCV RBC AUTO: 84.5 FL
MONOCYTES # BLD AUTO: 0.86 K/UL
MONOCYTES NFR BLD AUTO: 7.2 %
NEUTROPHILS # BLD AUTO: 6.92 K/UL
NEUTROPHILS NFR BLD AUTO: 57.7 %
PLATELET # BLD AUTO: 312 K/UL
POTASSIUM SERPL-SCNC: 4.7 MMOL/L
PROT SERPL-MCNC: 6.9 G/DL
RBC # BLD: 4.64 M/UL
RBC # FLD: 16 %
SODIUM SERPL-SCNC: 139 MMOL/L
TRIGL SERPL-MCNC: 147 MG/DL
WBC # FLD AUTO: 11.99 K/UL

## 2018-06-29 ENCOUNTER — RX RENEWAL (OUTPATIENT)
Age: 63
End: 2018-06-29

## 2018-06-29 RX ORDER — QUETIAPINE FUMARATE 100 MG/1
100 TABLET ORAL DAILY
Qty: 90 | Refills: 0 | Status: ACTIVE | COMMUNITY
Start: 2018-06-29 | End: 1900-01-01

## 2018-06-29 RX ORDER — BUPROPION HYDROCHLORIDE 100 MG/1
100 TABLET, FILM COATED, EXTENDED RELEASE ORAL DAILY
Qty: 90 | Refills: 3 | Status: ACTIVE | COMMUNITY
Start: 2018-06-29 | End: 1900-01-01

## 2018-06-29 RX ORDER — QUETIAPINE FUMARATE 200 MG/1
200 TABLET ORAL
Qty: 90 | Refills: 0 | Status: ACTIVE | COMMUNITY
Start: 2018-06-29 | End: 1900-01-01

## 2018-07-17 PROBLEM — F31.9 BIPOLAR DISORDER, UNSPECIFIED: Chronic | Status: ACTIVE | Noted: 2017-11-09

## 2018-07-17 PROBLEM — F31.9 BIPOLAR DISORDER, UNSPECIFIED: Chronic | Status: ACTIVE | Noted: 2017-04-26

## 2018-07-17 PROBLEM — E11.9 TYPE 2 DIABETES MELLITUS WITHOUT COMPLICATIONS: Chronic | Status: ACTIVE | Noted: 2017-04-26

## 2018-07-25 ENCOUNTER — APPOINTMENT (OUTPATIENT)
Dept: FAMILY MEDICINE | Facility: CLINIC | Age: 63
End: 2018-07-25
Payer: MEDICARE

## 2018-07-25 VITALS — SYSTOLIC BLOOD PRESSURE: 120 MMHG | TEMPERATURE: 97.9 F | DIASTOLIC BLOOD PRESSURE: 72 MMHG

## 2018-07-25 DIAGNOSIS — R79.89 OTHER SPECIFIED ABNORMAL FINDINGS OF BLOOD CHEMISTRY: ICD-10-CM

## 2018-07-25 PROCEDURE — 36415 COLL VENOUS BLD VENIPUNCTURE: CPT

## 2018-07-25 PROCEDURE — 99214 OFFICE O/P EST MOD 30 MIN: CPT | Mod: 25

## 2018-08-21 LAB
BASOPHILS # BLD AUTO: 0.03 K/UL
BASOPHILS NFR BLD AUTO: 0.3 %
EOSINOPHIL # BLD AUTO: 0.58 K/UL
EOSINOPHIL NFR BLD AUTO: 6.3 %
HBA1C MFR BLD HPLC: 6.6 %
HCT VFR BLD CALC: 43 %
HGB BLD-MCNC: 13.4 G/DL
IMM GRANULOCYTES NFR BLD AUTO: 0.3 %
LITHIUM SERPL-SCNC: 0.36 MMOL/L
LYMPHOCYTES # BLD AUTO: 2.41 K/UL
LYMPHOCYTES NFR BLD AUTO: 26.1 %
MAN DIFF?: NORMAL
MCHC RBC-ENTMCNC: 27.5 PG
MCHC RBC-ENTMCNC: 31.2 GM/DL
MCV RBC AUTO: 88.3 FL
MONOCYTES # BLD AUTO: 0.43 K/UL
MONOCYTES NFR BLD AUTO: 4.7 %
NEUTROPHILS # BLD AUTO: 5.76 K/UL
NEUTROPHILS NFR BLD AUTO: 62.3 %
PLATELET # BLD AUTO: 290 K/UL
RBC # BLD: 4.87 M/UL
RBC # FLD: 15.8 %
WBC # FLD AUTO: 9.24 K/UL

## 2018-09-13 ENCOUNTER — RX RENEWAL (OUTPATIENT)
Age: 63
End: 2018-09-13

## 2018-09-13 ENCOUNTER — APPOINTMENT (OUTPATIENT)
Dept: FAMILY MEDICINE | Facility: CLINIC | Age: 63
End: 2018-09-13

## 2018-09-19 ENCOUNTER — APPOINTMENT (OUTPATIENT)
Dept: FAMILY MEDICINE | Facility: CLINIC | Age: 63
End: 2018-09-19
Payer: MEDICARE

## 2018-09-19 VITALS — SYSTOLIC BLOOD PRESSURE: 118 MMHG | TEMPERATURE: 98.5 F | DIASTOLIC BLOOD PRESSURE: 66 MMHG

## 2018-09-19 DIAGNOSIS — E78.5 HYPERLIPIDEMIA, UNSPECIFIED: ICD-10-CM

## 2018-09-19 DIAGNOSIS — I10 ESSENTIAL (PRIMARY) HYPERTENSION: ICD-10-CM

## 2018-09-19 PROCEDURE — 99214 OFFICE O/P EST MOD 30 MIN: CPT

## 2018-09-21 ENCOUNTER — OTHER (OUTPATIENT)
Age: 63
End: 2018-09-21

## 2018-09-21 DIAGNOSIS — R32 UNSPECIFIED URINARY INCONTINENCE: ICD-10-CM

## 2018-10-02 ENCOUNTER — OTHER (OUTPATIENT)
Age: 63
End: 2018-10-02

## 2018-10-02 ENCOUNTER — FORM ENCOUNTER (OUTPATIENT)
Age: 63
End: 2018-10-02

## 2018-10-02 RX ORDER — SENNOSIDES 8.6 MG
CAPSULE ORAL
Qty: 100 | Refills: 5 | Status: ACTIVE | OUTPATIENT
Start: 2018-10-02

## 2018-10-03 ENCOUNTER — OUTPATIENT (OUTPATIENT)
Dept: OUTPATIENT SERVICES | Facility: HOSPITAL | Age: 63
LOS: 1 days | Discharge: HOME | End: 2018-10-03
Payer: MEDICARE

## 2018-10-03 DIAGNOSIS — F31.9 BIPOLAR DISORDER, UNSPECIFIED: ICD-10-CM

## 2018-10-03 DIAGNOSIS — G64 OTHER DISORDERS OF PERIPHERAL NERVOUS SYSTEM: ICD-10-CM

## 2018-10-03 DIAGNOSIS — R26.81 UNSTEADINESS ON FEET: ICD-10-CM

## 2018-10-03 DIAGNOSIS — Z98.890 OTHER SPECIFIED POSTPROCEDURAL STATES: Chronic | ICD-10-CM

## 2018-10-03 DIAGNOSIS — E11.9 TYPE 2 DIABETES MELLITUS WITHOUT COMPLICATIONS: ICD-10-CM

## 2018-10-03 DIAGNOSIS — N40.0 BENIGN PROSTATIC HYPERPLASIA WITHOUT LOWER URINARY TRACT SYMPTOMS: ICD-10-CM

## 2018-10-03 DIAGNOSIS — R05 COUGH: ICD-10-CM

## 2018-10-03 PROCEDURE — 71046 X-RAY EXAM CHEST 2 VIEWS: CPT | Mod: 26

## 2019-02-09 ENCOUNTER — RX RENEWAL (OUTPATIENT)
Age: 64
End: 2019-02-09

## 2019-04-18 NOTE — PATIENT PROFILE ADULT. - NS PRO TALK SOMEONE YN
[address] [facility] Emergency Department  [place] [facility] [id number]  Phone: [phone]  Fax: [phone]               April 18, 2019    Patient: Any Dumont   YOB: 1981   Date of Visit: 4/18/2019       To Whom It May Concern:    Roland Maravilla was seen and treated in our emergency department on 4/18/2019. She may return to work on April 23, 2019.       Sincerely,       Milly SANCHES RN         Signature:__________________________________ no

## 2019-04-23 NOTE — ED ADULT NURSE NOTE - PAIN RATING/NUMBER SCALE (0-10): ACTIVITY
Level II US and MFM visit reviewed.  Good fetal movement.  She denies leaking of fluid, bleeding or contractions.   labor instructions understood.  She has an US and visit with MFM in 2 weeks.  Follow up in 4 weeks.   8

## 2019-05-07 ENCOUNTER — RX RENEWAL (OUTPATIENT)
Age: 64
End: 2019-05-07

## 2019-08-05 PROBLEM — L29.8 JOCK ITCH: Status: ACTIVE | Noted: 2017-01-10

## 2019-08-14 ENCOUNTER — APPOINTMENT (OUTPATIENT)
Dept: FAMILY MEDICINE | Facility: CLINIC | Age: 64
End: 2019-08-14
Payer: MEDICARE

## 2019-08-14 ENCOUNTER — NON-APPOINTMENT (OUTPATIENT)
Age: 64
End: 2019-08-14

## 2019-08-14 VITALS
HEART RATE: 74 BPM | TEMPERATURE: 98 F | OXYGEN SATURATION: 97 % | RESPIRATION RATE: 18 BRPM | BODY MASS INDEX: 36.98 KG/M2 | HEIGHT: 68 IN | WEIGHT: 244 LBS

## 2019-08-14 DIAGNOSIS — F31.9 BIPOLAR DISORDER, UNSPECIFIED: ICD-10-CM

## 2019-08-14 DIAGNOSIS — E11.9 TYPE 2 DIABETES MELLITUS W/OUT COMPLICATIONS: ICD-10-CM

## 2019-08-14 DIAGNOSIS — Z79.4 TYPE 2 DIABETES MELLITUS W/OUT COMPLICATIONS: ICD-10-CM

## 2019-08-14 PROCEDURE — G0439: CPT

## 2019-08-14 PROCEDURE — 93000 ELECTROCARDIOGRAM COMPLETE: CPT

## 2019-08-14 RX ORDER — TAMSULOSIN HYDROCHLORIDE 0.4 MG/1
0.4 CAPSULE ORAL
Qty: 90 | Refills: 1 | Status: ACTIVE | COMMUNITY
Start: 2017-09-19 | End: 1900-01-01

## 2019-08-15 VITALS — DIASTOLIC BLOOD PRESSURE: 68 MMHG | SYSTOLIC BLOOD PRESSURE: 118 MMHG

## 2019-08-15 VITALS — SYSTOLIC BLOOD PRESSURE: 118 MMHG | DIASTOLIC BLOOD PRESSURE: 68 MMHG

## 2019-09-04 ENCOUNTER — RX RENEWAL (OUTPATIENT)
Age: 64
End: 2019-09-04

## 2019-09-10 ENCOUNTER — RX RENEWAL (OUTPATIENT)
Age: 64
End: 2019-09-10

## 2019-09-26 ENCOUNTER — APPOINTMENT (OUTPATIENT)
Dept: FAMILY MEDICINE | Facility: CLINIC | Age: 64
End: 2019-09-26

## 2019-12-13 ENCOUNTER — RX RENEWAL (OUTPATIENT)
Age: 64
End: 2019-12-13

## 2019-12-16 ENCOUNTER — RX RENEWAL (OUTPATIENT)
Age: 64
End: 2019-12-16

## 2019-12-16 DIAGNOSIS — K59.09 OTHER CONSTIPATION: ICD-10-CM

## 2020-01-27 RX ORDER — LISINOPRIL 2.5 MG/1
2.5 TABLET ORAL
Qty: 90 | Refills: 3 | Status: ACTIVE | COMMUNITY
Start: 2020-01-27 | End: 1900-01-01

## 2020-06-09 RX ORDER — ASPIRIN ENTERIC COATED TABLETS 81 MG 81 MG/1
81 TABLET, DELAYED RELEASE ORAL
Qty: 1 | Refills: 11 | Status: ACTIVE | COMMUNITY
Start: 2019-12-16 | End: 1900-01-01

## 2020-06-09 RX ORDER — DOCUSATE SODIUM 100 MG/1
100 CAPSULE ORAL
Qty: 100 | Refills: 4 | Status: ACTIVE | COMMUNITY
Start: 2019-12-16 | End: 1900-01-01

## 2020-06-24 DIAGNOSIS — Z00.00 ENCOUNTER FOR GENERAL ADULT MEDICAL EXAMINATION W/OUT ABNORMAL FINDINGS: ICD-10-CM

## 2020-07-01 ENCOUNTER — RX RENEWAL (OUTPATIENT)
Age: 65
End: 2020-07-01

## 2020-07-16 DIAGNOSIS — M54.5 LOW BACK PAIN: ICD-10-CM

## 2020-07-28 RX ORDER — BLOOD SUGAR DIAGNOSTIC
STRIP MISCELLANEOUS 3 TIMES DAILY
Qty: 1 | Refills: 5 | Status: ACTIVE | COMMUNITY
Start: 2020-07-28 | End: 1900-01-01

## 2020-07-28 RX ORDER — LANCETS 28 GAUGE
EACH MISCELLANEOUS
Qty: 1 | Refills: 11 | Status: ACTIVE | COMMUNITY
Start: 2020-07-28 | End: 1900-01-01

## 2020-07-28 RX ORDER — BLOOD SUGAR DIAGNOSTIC
STRIP MISCELLANEOUS
Qty: 3 | Refills: 1 | Status: ACTIVE | COMMUNITY
Start: 2020-07-28 | End: 1900-01-01

## 2021-03-22 ENCOUNTER — APPOINTMENT (OUTPATIENT)
Dept: FAMILY MEDICINE | Facility: CLINIC | Age: 66
End: 2021-03-22

## 2021-07-22 NOTE — OCCUPATIONAL THERAPY INITIAL EVALUATION ADULT - RIGHT SCAPULA DEPRESSION PROM, REHAB EVAL
[Polyarticular RF Positive] : Polyarticular RF Positive [No] : no glaucoma [0] : 0 [FreeTextEntry1] : Here for follow up with mother today. nO missed doses of medication and no joint pain, no morning stiffness, no joint swelling. \par No headache, no nausea/vomiting/diarrhea, no blood in urine/stool, no rash, no fevers. \par last saw eye doctor in 2020 - negative.\par \par Had COVID in 3/2021 - positive PCR. Also multiple family members diagnosed with COVID at that time and maternal grandfather  from COVID-19.\par Milana was recently diagnosed with COVID back in March. he had runny nose, loss of taste and smell, and some cough. \par \par X-ray bilateral hips and pelvis done 20. No bony destructive process, joint spaces maintained. (see report on Desktop)\par \par -Saw optho Dr. Sanchez 20. No uveitis. \par  [JIASubtypeDaclayton] : 2/2015 [DateLastOpUC Health] : 11/2020 [DurMorningStiffness] : 0 [Noncontributory] : The patient's family history was noncontributory [Unlimited ADLs] : able to do activities of daily living without limitations [Unlimited Sports] : able to participate in sports without limitations [Anorexia] : no anorexia [Weight Loss] : no weight loss [Malaise] : no malaise [Fever] : no fever [Malar Facial Rash] : no malar facial rash [Skin Lesions] : no skin lesions [Oral Ulcers] : no oral ulcers [Dysphonia] : no dysphonia [Dysphagia] : no dysphagia [Chest Pain] : no chest pain [Arthralgias] : no arthralgias [Joint Swelling] : no joint swelling [Joint Warmth] : no joint warmth [Joint Deformity] : no joint deformity [Decreased ROM] : no decreased range of motion [Morning Stiffness] : no morning stiffness [Falls] : no falls [Difficulty Standing] : no difficulty standing [Difficulty Walking] : no difficulty walking [Dyspnea] : no dyspnea [Myalgias] : no myalgias [Muscle Weakness] : no muscle weakness [Muscle Spasms] : no muscle spasms [Muscle Cramping] : no muscle cramping [Visual Changes] : no visual changes [Eye Pain] : no eye pain [Eye Redness] : no eye redness [None] : No associated symptoms are reported full

## 2021-08-20 NOTE — PHYSICAL THERAPY INITIAL EVALUATION ADULT - ADDITIONAL COMMENTS
CVA
As per patient, lives c mother in private house c 4 stair steps to enter. Used a cane sometimes, but generally independent in all transfers, gait and ADLs.

## 2021-12-17 NOTE — ED ADULT NURSE NOTE - NS ED NURSE LEVEL OF CONSCIOUSNESS AFFECT
Patient is seeing GI for her anemia, refill request came in from pharmacy and has been refused asked pharmacy to send request to Dr. Maryse Johnson.
Appropriate/Calm

## 2022-10-03 NOTE — OCCUPATIONAL THERAPY INITIAL EVALUATION ADULT - PAIN SENSATION, TRUNK, REHAB EVAL
A 80 year old female, from home, ambulating w/ assistance, with PMHx dementia presents s/p fall. found to have Impacted fracture surgical neck left humerus. CT head negative. Ortho consulted. Recommended conservative management. Pending PT eval.    A 80 year old female, from home, ambulating w/ assistance, with PMHx dementia presents s/p fall. found to have Impacted fracture surgical neck left humerus. CT head negative. Ortho consulted. Recommended conservative management. Pending PT eval. daughter requesting  [facility] for rehab. Cm made aware. A 80 year old female, from home, ambulating w/ assistance, with PMHx dementia presents s/p fall. found to have Impacted fracture surgical neck left humerus. CT head negative. Ortho consulted. Recommended conservative management. PT julio BAH. d/c [facility] for rehab. Cm made aware.    Please note that this a brief summary of hospital course please refer to daily progress notes and consult notes for full course and events    updated  daughter in law, Kelsey   within normal limits

## 2022-10-07 ENCOUNTER — EMERGENCY (EMERGENCY)
Facility: HOSPITAL | Age: 67
LOS: 1 days | Discharge: ANOTHER HOSPITAL | End: 2022-10-07
Attending: STUDENT IN AN ORGANIZED HEALTH CARE EDUCATION/TRAINING PROGRAM | Admitting: PSYCHIATRY & NEUROLOGY

## 2022-10-07 VITALS
HEART RATE: 73 BPM | TEMPERATURE: 98 F | OXYGEN SATURATION: 99 % | SYSTOLIC BLOOD PRESSURE: 123 MMHG | DIASTOLIC BLOOD PRESSURE: 78 MMHG | HEIGHT: 68 IN | RESPIRATION RATE: 18 BRPM

## 2022-10-07 VITALS
RESPIRATION RATE: 18 BRPM | HEART RATE: 81 BPM | DIASTOLIC BLOOD PRESSURE: 86 MMHG | SYSTOLIC BLOOD PRESSURE: 134 MMHG | OXYGEN SATURATION: 96 % | TEMPERATURE: 99 F

## 2022-10-07 DIAGNOSIS — Z98.890 OTHER SPECIFIED POSTPROCEDURAL STATES: Chronic | ICD-10-CM

## 2022-10-07 DIAGNOSIS — F31.9 BIPOLAR DISORDER, UNSPECIFIED: ICD-10-CM

## 2022-10-07 LAB
ALBUMIN SERPL ELPH-MCNC: 3.7 G/DL — SIGNIFICANT CHANGE UP (ref 3.3–5)
ALP SERPL-CCNC: 96 U/L — SIGNIFICANT CHANGE UP (ref 40–120)
ALT FLD-CCNC: 13 U/L — SIGNIFICANT CHANGE UP (ref 4–41)
ANION GAP SERPL CALC-SCNC: 11 MMOL/L — SIGNIFICANT CHANGE UP (ref 7–14)
APAP SERPL-MCNC: <10 UG/ML — LOW (ref 15–25)
AST SERPL-CCNC: 15 U/L — SIGNIFICANT CHANGE UP (ref 4–40)
BASOPHILS # BLD AUTO: 0.07 K/UL — SIGNIFICANT CHANGE UP (ref 0–0.2)
BASOPHILS NFR BLD AUTO: 0.7 % — SIGNIFICANT CHANGE UP (ref 0–2)
BILIRUB SERPL-MCNC: 0.3 MG/DL — SIGNIFICANT CHANGE UP (ref 0.2–1.2)
BUN SERPL-MCNC: 19 MG/DL — SIGNIFICANT CHANGE UP (ref 7–23)
CALCIUM SERPL-MCNC: 9.4 MG/DL — SIGNIFICANT CHANGE UP (ref 8.4–10.5)
CHLORIDE SERPL-SCNC: 108 MMOL/L — HIGH (ref 98–107)
CO2 SERPL-SCNC: 22 MMOL/L — SIGNIFICANT CHANGE UP (ref 22–31)
CREAT SERPL-MCNC: 0.72 MG/DL — SIGNIFICANT CHANGE UP (ref 0.5–1.3)
EGFR: 101 ML/MIN/1.73M2 — SIGNIFICANT CHANGE UP
EOSINOPHIL # BLD AUTO: 0.42 K/UL — SIGNIFICANT CHANGE UP (ref 0–0.5)
EOSINOPHIL NFR BLD AUTO: 4.3 % — SIGNIFICANT CHANGE UP (ref 0–6)
ETHANOL SERPL-MCNC: <10 MG/DL — SIGNIFICANT CHANGE UP
GLUCOSE BLDC GLUCOMTR-MCNC: 122 MG/DL — HIGH (ref 70–99)
GLUCOSE SERPL-MCNC: 143 MG/DL — HIGH (ref 70–99)
HCT VFR BLD CALC: 41.9 % — SIGNIFICANT CHANGE UP (ref 39–50)
HGB BLD-MCNC: 13.5 G/DL — SIGNIFICANT CHANGE UP (ref 13–17)
IANC: 6.34 K/UL — SIGNIFICANT CHANGE UP (ref 1.8–7.4)
IMM GRANULOCYTES NFR BLD AUTO: 0.4 % — SIGNIFICANT CHANGE UP (ref 0–0.9)
LYMPHOCYTES # BLD AUTO: 2.28 K/UL — SIGNIFICANT CHANGE UP (ref 1–3.3)
LYMPHOCYTES # BLD AUTO: 23.2 % — SIGNIFICANT CHANGE UP (ref 13–44)
MCHC RBC-ENTMCNC: 28.4 PG — SIGNIFICANT CHANGE UP (ref 27–34)
MCHC RBC-ENTMCNC: 32.2 GM/DL — SIGNIFICANT CHANGE UP (ref 32–36)
MCV RBC AUTO: 88.2 FL — SIGNIFICANT CHANGE UP (ref 80–100)
MONOCYTES # BLD AUTO: 0.67 K/UL — SIGNIFICANT CHANGE UP (ref 0–0.9)
MONOCYTES NFR BLD AUTO: 6.8 % — SIGNIFICANT CHANGE UP (ref 2–14)
NEUTROPHILS # BLD AUTO: 6.34 K/UL — SIGNIFICANT CHANGE UP (ref 1.8–7.4)
NEUTROPHILS NFR BLD AUTO: 64.6 % — SIGNIFICANT CHANGE UP (ref 43–77)
NRBC # BLD: 0 /100 WBCS — SIGNIFICANT CHANGE UP (ref 0–0)
NRBC # FLD: 0 K/UL — SIGNIFICANT CHANGE UP (ref 0–0)
PLATELET # BLD AUTO: 338 K/UL — SIGNIFICANT CHANGE UP (ref 150–400)
POTASSIUM SERPL-MCNC: 4.2 MMOL/L — SIGNIFICANT CHANGE UP (ref 3.5–5.3)
POTASSIUM SERPL-SCNC: 4.2 MMOL/L — SIGNIFICANT CHANGE UP (ref 3.5–5.3)
PROT SERPL-MCNC: 6.5 G/DL — SIGNIFICANT CHANGE UP (ref 6–8.3)
RBC # BLD: 4.75 M/UL — SIGNIFICANT CHANGE UP (ref 4.2–5.8)
RBC # FLD: 14.1 % — SIGNIFICANT CHANGE UP (ref 10.3–14.5)
SALICYLATES SERPL-MCNC: <0.3 MG/DL — LOW (ref 15–30)
SARS-COV-2 RNA SPEC QL NAA+PROBE: SIGNIFICANT CHANGE UP
SODIUM SERPL-SCNC: 141 MMOL/L — SIGNIFICANT CHANGE UP (ref 135–145)
TSH SERPL-MCNC: 1.53 UIU/ML — SIGNIFICANT CHANGE UP (ref 0.27–4.2)
WBC # BLD: 9.82 K/UL — SIGNIFICANT CHANGE UP (ref 3.8–10.5)
WBC # FLD AUTO: 9.82 K/UL — SIGNIFICANT CHANGE UP (ref 3.8–10.5)

## 2022-10-07 RX ORDER — OLANZAPINE 15 MG/1
5 TABLET, FILM COATED ORAL ONCE
Refills: 0 | Status: DISCONTINUED | OUTPATIENT
Start: 2022-10-07 | End: 2022-10-08

## 2022-10-07 RX ORDER — INSULIN GLARGINE 100 [IU]/ML
0 INJECTION, SOLUTION SUBCUTANEOUS
Qty: 0 | Refills: 0 | DISCHARGE

## 2022-10-07 RX ORDER — LINACLOTIDE 145 UG/1
145 CAPSULE, GELATIN COATED ORAL DAILY
Refills: 0 | Status: DISCONTINUED | OUTPATIENT
Start: 2022-10-07 | End: 2022-10-08

## 2022-10-07 RX ORDER — HALOPERIDOL DECANOATE 100 MG/ML
1 INJECTION INTRAMUSCULAR
Qty: 0 | Refills: 0 | DISCHARGE

## 2022-10-07 RX ORDER — QUETIAPINE FUMARATE 200 MG/1
0 TABLET, FILM COATED ORAL
Qty: 0 | Refills: 0 | DISCHARGE

## 2022-10-07 RX ORDER — BENZTROPINE MESYLATE 1 MG
0 TABLET ORAL
Qty: 0 | Refills: 0 | DISCHARGE

## 2022-10-07 NOTE — ED BEHAVIORAL HEALTH ASSESSMENT NOTE - DETAILS
Quality 110: Preventive Care And Screening: Influenza Immunization: Influenza immunization was not ordered or administered, reason not given Detail Level: Detailed Quality 431: Preventive Care And Screening: Unhealthy Alcohol Use - Screening: Patient not identified as an unhealthy alcohol user when screened for unhealthy alcohol use using a systematic screening method Quality 226: Preventive Care And Screening: Tobacco Use: Screening And Cessation Intervention: Patient screened for tobacco use and is an ex/non-smoker Quality 130: Documentation Of Current Medications In The Medical Record: Current Medications Documented NH/brother history of multiple suicide attempts per brother- sister unknown mental illness GIUSEPPE GIUSEPPE [place]

## 2022-10-07 NOTE — ED BEHAVIORAL HEALTH ASSESSMENT NOTE - OTHER PAST PSYCHIATRIC HISTORY (INCLUDE DETAILS REGARDING ONSET, COURSE OF ILLNESS, INPATIENT/OUTPATIENT TREATMENT)
PPH Bipolar 1 Disorder. Hx of many inpatient admissions- last as few years ago at [place] per brother. Hx of multiple past suicide attempts- none recent.

## 2022-10-07 NOTE — ED PROVIDER NOTE - OBJECTIVE STATEMENT
68 yo M hx bipolar disorder sent in for agitation. Reportedly not taking meds. Pt becomes agitated during history taking, provides little history.  Denies SI/HI, AH/VH

## 2022-10-07 NOTE — ED BEHAVIORAL HEALTH ASSESSMENT NOTE - SUMMARY
Patient is a 67 year male single disabled male currently residing at [facility]. PPH Bipolar 1 Disorder. Hx of many inpatient admissions- last as few years ago at [place] per brother. Hx of multiple past suicide attempts- none recent. + history of aggressive behavior in the context of non compliance with medication. No legal issues or substance use issues. PMH- DM2 (insulin dependent), constipation generalized muscle weakness, BPH. BIBA referred by SNF for agitation.    Patient presents to the ED in the context of agitation. Patient has been non compliant with medications, refusing ADLs and not caring for self. He is agitated in ED, psychotic and disorganized. He is acutely decompensated from baseline with poor insight and unpredictable behavior. He requires inpatient admission for safety and stabilization.

## 2022-10-07 NOTE — ED PROVIDER NOTE - PROGRESS NOTE DETAILS
Received pt on sign out from Dr Moore pending . pt to be admitted to  Dr Jefferson   aware that ua not done. Received pt on sign out from Dr Moore pending . Per Dr Moore pt stable, no specific complaints. ordered basic labs for  evaluation. pt to be admitted to  Dr Jefferson   np romario aware that ua not done. pt has ekg RBBB prior ekg nl sr in 2009, given pt has no cp or sob. vss pt does not need emergent medical admission. may follow up with pmd and cardiology once cleared by . spoke w KATIUSKA Joseph from . BH recommend Zyprexa 5mg IM pt unable to endorse cp or sob given agitated/ psychotic state. trop 20 w nl cr, will trend trop and admit to med. SHAHANA TAYLOR attending. paged hosp SHAHANA hospitalist Dr Villela for admission on tele and 1:1 Pt not eating currently  hold long acting insulin, [facility] and RN at .    aware and will see while admitted. SHAHANA hospitalist Dr Villela for admission on tele and 1:1 Pt not eating currently  hold long acting insulin, [facility] and RN at .    aware and will see while admitted.  Admitting office called, will change to admission under Dr Villela

## 2022-10-07 NOTE — ED BEHAVIORAL HEALTH ASSESSMENT NOTE - PSYCHIATRIC ISSUES AND PLAN (INCLUDE STANDING AND PRN MEDICATION)
restart Lithium 300mg TID, Seroquel 100mg QHS, Zyprexa 5mg PO/IM PRN restart Lithium 300mg TID, Seroquel 100mg QHS, Zyprexa 5mg IM PRN; Seroquel 25mg PRN

## 2022-10-07 NOTE — ED ADULT NURSE NOTE - CHIEF COMPLAINT QUOTE
sent in from [facility] for  incased agitation,  per report pt not taking his medications, hx of bipolar and DM, pt is calm and cooperative.

## 2022-10-07 NOTE — ED ADULT NURSE NOTE - OBJECTIVE STATEMENT
Pt BIBEMS from NH. EMS reports NH advised them pt had not been allowing staff in his room for an unknown amount of time, pt urinating and defecating all over room and self. Pt is a&ox2, he is agitated but redirectable. Pt appears unkempt, has urine all over himself and his clothes. Pt denies SI/HI/hallucinations, states the only meds he is taking is seroquel and lithium. Pt airway patent, breathing spontaneous and unlabored, appears to be in NAD. Labs drawn. Will continue to monitor.

## 2022-10-07 NOTE — ED BEHAVIORAL HEALTH ASSESSMENT NOTE - MEDICAL ISSUES AND PLAN (INCLUDE STANDING AND PRN MEDICATION)
Aspirin EC 81 mg daily, Bisacodyl 10mg daily, flomax 0.4mg qd, Insulin 25 units QHS (hold if NPO or BS less than 120), Linzess 145mcg, Miralax 17gm daily, Sodium bicarb 650mg bID, fall risk- uses walker

## 2022-10-07 NOTE — ED BEHAVIORAL HEALTH NOTE - BEHAVIORAL HEALTH NOTE
COVID Exposure Screen- Patient    1.	*Have you had a COVID-19 test in the last 90 days?  (  ) Yes   ( x ) No   (  ) Unknown- Reason: _____  IF YES PROCEED TO QUESTION #2. IF NO OR UNKNOWN, PLEASE SKIP TO QUESTION #3.  2.	Date of test(s) and result(s): ________    3.	*Have you tested positive for COVID-19 antibodies? (  ) Yes   ( x) No   (  ) Unknown- Reason: _____  IF YES PROCEED TO QUESTION #4. IF NO or UNKNOWN, PLEASE SKIP TO QUESTION #5.  4.	Date of positive antibody test: ________    5.	*Have you received 2 doses of the COVID-19 vaccine? (x ) Yes   (  ) No   (  ) Unknown- Reason: _____   IF YES PROCEED TO QUESTION #6. IF NO or UNKNOWN, PLEASE SKIP TO QUESTION #7.  6.	Date of second dose: 2 boosters- last 1 montha go    7.	*In the past 10 days, have you been around anyone with a positive COVID-19 test?* (  ) Yes   ( x ) No   (  ) Unknown- Reason: ____  IF YES PROCEED TO QUESTION #8. IF NO or UNKNOWN, PLEASE SKIP TO QUESTION #13.  8.	Were you within 6 feet of them for at least 15 minutes? (  ) Yes   (  ) No   (  ) Unknown- Reason: _____  9.	Have you provided care for them? (  ) Yes   (  ) No   (  ) Unknown- Reason: ______  10.	Have you had direct physical contact with them (touched, hugged, or kissed them)? (  ) Yes   (  ) No    (  ) Unknown- Reason: _____  11.	Have you shared eating or drinking utensils with them? (  ) Yes   (  ) No    (  ) Unknown- Reason: ____  12.	Have they sneezed, coughed, or somehow gotten respiratory droplets on you? (  ) Yes   (  ) No    (  ) Unknown- Reason: ______    13.	*Have you been out of [place] within the past 10 days?* (  ) Yes   ( x ) No   (  ) Unknown- Reason: _____  IF YES PLEASE ANSWER THE FOLLOWING QUESTIONS:  14.	Which state/country have you been to? ______  15.	Were you there over 24 hours? (  ) Yes   (  ) No    (  ) Unknown- Reason: ______  16.	Date of return to [facility]: ______

## 2022-10-07 NOTE — ED BEHAVIORAL HEALTH ASSESSMENT NOTE - ATTENDING COMMENTS
67M bipolar disorder presents from NH for agitation. Patient barricading self in room, urinating and defecating on the floor, not taking medications, not allowing staff to assist with ADLs. Presents with disorganization, unable to care for self needing admission.

## 2022-10-07 NOTE — ED BEHAVIORAL HEALTH ASSESSMENT NOTE - DESCRIPTION
67 year old lives in NH none During course of ED visit patient was calm and cooperative. Patient was not aggressive or violent and did not require PRN medications.     ICU Vital Signs Last 24 Hrs  T(C): 36.8 (07 Oct 2022 14:11), Max: 36.8 (07 Oct 2022 14:11)  T(F): 98.2 (07 Oct 2022 14:11), Max: 98.2 (07 Oct 2022 14:11)  HR: 73 (07 Oct 2022 14:11) (73 - 73)  BP: 123/78 (07 Oct 2022 14:11) (123/78 - 123/78)  BP(mean): --  ABP: --  ABP(mean): --  RR: 18 (07 Oct 2022 14:11) (18 - 18)  SpO2: 99% (07 Oct 2022 14:11) (99% - 99%)    O2 Parameters below as of 07 Oct 2022 14:11  Patient On (Oxygen Delivery Method): room air DM2 (insulin dependent), constipation generalized muscle weakness, BPH During course of ED visit patient was mostly cooperative and calm. Patient was not aggressive or violent and did not require PRN medications.     ICU Vital Signs Last 24 Hrs  T(C): 36.8 (07 Oct 2022 14:11), Max: 36.8 (07 Oct 2022 14:11)  T(F): 98.2 (07 Oct 2022 14:11), Max: 98.2 (07 Oct 2022 14:11)  HR: 73 (07 Oct 2022 14:11) (73 - 73)  BP: 123/78 (07 Oct 2022 14:11) (123/78 - 123/78)  BP(mean): --  ABP: --  ABP(mean): --  RR: 18 (07 Oct 2022 14:11) (18 - 18)  SpO2: 99% (07 Oct 2022 14:11) (99% - 99%)    O2 Parameters below as of 07 Oct 2022 14:11  Patient On (Oxygen Delivery Method): room air During course of ED visit patient was mostly cooperative. He was psychotic, talking to self, irritable at times- accepting of IM Zyprexa 5mg requested at 22:49.     ICU Vital Signs Last 24 Hrs  T(C): 36.8 (07 Oct 2022 14:11), Max: 36.8 (07 Oct 2022 14:11)  T(F): 98.2 (07 Oct 2022 14:11), Max: 98.2 (07 Oct 2022 14:11)  HR: 73 (07 Oct 2022 14:11) (73 - 73)  BP: 123/78 (07 Oct 2022 14:11) (123/78 - 123/78)  BP(mean): --  ABP: --  ABP(mean): --  RR: 18 (07 Oct 2022 14:11) (18 - 18)  SpO2: 99% (07 Oct 2022 14:11) (99% - 99%)    O2 Parameters below as of 07 Oct 2022 14:11  Patient On (Oxygen Delivery Method): room air

## 2022-10-07 NOTE — ED PROVIDER NOTE - NSICDXPASTMEDICALHX_GEN_ALL_CORE_FT
PAST MEDICAL HISTORY:  Bipolar 1 disorder     Bipolar affective disorder     DM2 (diabetes mellitus, type 2)

## 2022-10-07 NOTE — ED BEHAVIORAL HEALTH ASSESSMENT NOTE - NSSUICRSKFACTOR_PSY_ALL_CORE
Current and Past Psychiatric Diagnoses/Presenting Symptoms/Historical Factors/Treatment Related Factors/Activating Events/Stressors Current and Past Psychiatric Diagnoses/Presenting Symptoms/Treatment Related Factors/Activating Events/Stressors

## 2022-10-07 NOTE — ED BEHAVIORAL HEALTH NOTE - BEHAVIORAL HEALTH NOTE
Spoke with RN at [place], Latoya, [phone], who stated that the patient has declined over the past 2 weeks, since his sister visited. Prior, the patient was compliant, happy, alert. Over these weeks, he has been more isolative, not allowing staff to come and clean his room. He has been yelling and screaming at the televisions, intermittently refusing his medications. The patient today clogged the toiled, and the rehab had to call the police for assistance to get into his room as he barricaded himself. THe room had a strong odor of urine, and there was feces in places other than the toilet. Polaris called pt's brother who reported that this has occurred prior.   Pt's has not undergone any medicine changes, and there were no medical concerns.

## 2022-10-07 NOTE — ED BEHAVIORAL HEALTH ASSESSMENT NOTE - OTHER
unable to assess due to psychosis; refused to fully participate in evaluation Peers not evaluated brother, staff documentation from SNF not evaluated- in bed not cooperative, generalized weakness by hx

## 2022-10-07 NOTE — ED BEHAVIORAL HEALTH NOTE - BEHAVIORAL HEALTH NOTE
Writer attempted to contact [facility] ([phone]) to obtain collateral information for nica Kevin. Writer spoke w/ rosa who reported a staff member will call back and provide collateral information.

## 2022-10-07 NOTE — ED PROVIDER NOTE - CLINICAL SUMMARY MEDICAL DECISION MAKING FREE TEXT BOX
68 yo M presenting with medication non-compliance and agitation-- plan for basic labs, psych eval
No

## 2022-10-07 NOTE — ED BEHAVIORAL HEALTH ASSESSMENT NOTE - RISK ASSESSMENT
modifiable risk factors- agitation, non compliance, psychosis    unmodifiable risk factors- history of suicide attempts, history of inpatient admissions, chronic mental illness    protective factors- nursing home, supportive family, no depression Low Acute Suicide Risk

## 2022-10-07 NOTE — ED BEHAVIORAL HEALTH ASSESSMENT NOTE - HPI (INCLUDE ILLNESS QUALITY, SEVERITY, DURATION, TIMING, CONTEXT, MODIFYING FACTORS, ASSOCIATED SIGNS AND SYMPTOMS)
Patient is a 67 year male single disabled male currently residing at [facility]. PPH Bipolar 1 Disorder. Hx of many inpatient admissions- last as few years ago at [place] per brother. Hx of multiple past suicide attempts- none recent. + history of aggressive behavior in the context of non compliance with medication. No legal issues or substance use issues. PMH- DM2 (insulin dependent), constipation generalized muscle weakness, BPH. BIBA referred by SNF for agitation.     Met with patient in room. He was lying down mumbling to self. He stated "porn is bad!" He reports he was brought to the ED "for a better life," and nothing is wrong with him. He was disorganized and challenging to interview. He denied SI/HI/SIB/intent/plan. He became agitated when asked about other symptoms "I'm not crazy, get out of here!" He then asked if evaluator was a prostitute and said "get out!".     Spoke with Brother Harshad- He reports patient has been on lithium most of his life and does well when he is compliant. He is disabled- history of multiple psychiatric hospitalizations and suicide attempts. He lives in SNF due to his limitations from his mental illness. He is extremely intelligent and "kind man" when he is compliant and responds well to his medication regimen. Mother  5 months ago and was at [facility] as well. This has been hard for the patient. Patient's sister is also "mentally unwell" and may have triggered patient when she visited. Brother reports he uses a cane/walker. He does not have good balance. Patient requires assistance with ADLs- toileting/dressing self. He stated patient needs his food cut.    Spoke with nurse Fischer- Patient has a regular bed. He uses a walker, he is a 1 person assist, patient is on regular liquids and diabetic diet. Patient uses a urinal and has a diaper "on sometimes." She reports currently patient needs assistance with all ADLs and tolieting/eating due to his behavior. At baseline he is independent. Patient is a 67 year male single disabled male currently residing at [facility]. PPH Bipolar 1 Disorder. Hx of many inpatient admissions- last as few years ago at [place] per brother. Hx of multiple past suicide attempts- none recent. + history of aggressive behavior in the context of non compliance with medication. No legal issues or substance use issues. PMH- DM2 (insulin dependent), constipation generalized muscle weakness, BPH. BIBA referred by SNF for agitation.     Met with patient in room. He was lying down mumbling to self. He stated "porn is bad!" He reports he was brought to the ED "for a better life," and nothing is wrong with him. He was disorganized and challenging to interview. He denied SI/HI/SIB/intent/plan. He became agitated when asked about other symptoms "I'm not crazy, get out of here!" He then asked if evaluator was a prostitute and said "get out!".     Spoke with Brother Harshad- He reports patient has been on lithium most of his life and does well when he is compliant. He is disabled- history of multiple psychiatric hospitalizations and suicide attempts. He lives in SNF due to his limitations from his mental illness. He is extremely intelligent and "kind man" when he is compliant and responds well to his medication regimen. Mother  5 months ago and was at [facility] as well. This has been hard for the patient. Patient's sister is also "mentally unwell" and may have triggered patient when she visited. Brother reports he uses a cane/walker. He does not have good balance. Patient requires assistance with ADLs- toileting/dressing self. He stated patient needs his food cut but he isn't sure. He is vaccinated- 2 boosters.     Spoke with nurse Fischer- Patient has a regular bed -[facility] hospital. He uses a walker, he is a 1 person assist, patient is on regular liquids and full regular diabetic diet. Patient uses a urinal and has a diaper "on sometimes." She reports currently patient needs assistance with all ADLs and tolieting/eating due to his behavior. At baseline he is independent.

## 2022-10-07 NOTE — ED ADULT TRIAGE NOTE - CHIEF COMPLAINT QUOTE
sent in from [facility] for  incased agitation,  per report pt not taking his medications, hx of bipolar and DM, pt is calm and cooperative.

## 2022-10-07 NOTE — ED BEHAVIORAL HEALTH ASSESSMENT NOTE - CURRENT MEDICATION
Aspirin EC 81 mg daily, Bisacodyl 10mg daily, flomax 0.4mg qd, Insulin 25 units QHS (hold if NPO or BS less than 120), Linzess 145mcg, Miralax 17gm daily, Sodium bicarb 650mg bID    Lithium 300mg TID, melatonin 5mg QHS, Seroquel 400mg QHS    Bengay to knee, fleet enema PRN, MOM 30mL PRN daily (if no BM x 6 days) Aspirin EC 81 mg daily, Bisacodyl 10mg daily, flomax 0.4mg qd, Insulin 25 units QHS (hold if NPO or BS less than 120), Linzess 145mcg, Miralax 17gm daily, Sodium bicarb 650mg BID    Lithium 300mg TID, melatonin 5mg QHS, Seroquel 400mg QHS    Bengay to knee, fleet enema PRN if suppository ineffective, Bisacodyl 10mg suppository PRN if MOM ineffective, MOM 30mL PRN daily (if no BM x 6 shifts)

## 2022-10-08 ENCOUNTER — INPATIENT (INPATIENT)
Facility: HOSPITAL | Age: 67
LOS: 10 days | Discharge: ANOTHER HOSPITAL | End: 2022-10-19
Attending: STUDENT IN AN ORGANIZED HEALTH CARE EDUCATION/TRAINING PROGRAM | Admitting: STUDENT IN AN ORGANIZED HEALTH CARE EDUCATION/TRAINING PROGRAM

## 2022-10-08 VITALS
SYSTOLIC BLOOD PRESSURE: 142 MMHG | DIASTOLIC BLOOD PRESSURE: 74 MMHG | OXYGEN SATURATION: 100 % | HEART RATE: 81 BPM | RESPIRATION RATE: 19 BRPM | TEMPERATURE: 98 F

## 2022-10-08 DIAGNOSIS — E11.9 TYPE 2 DIABETES MELLITUS WITHOUT COMPLICATIONS: ICD-10-CM

## 2022-10-08 DIAGNOSIS — G93.40 ENCEPHALOPATHY, UNSPECIFIED: ICD-10-CM

## 2022-10-08 DIAGNOSIS — Z29.9 ENCOUNTER FOR PROPHYLACTIC MEASURES, UNSPECIFIED: ICD-10-CM

## 2022-10-08 DIAGNOSIS — F31.9 BIPOLAR DISORDER, UNSPECIFIED: ICD-10-CM

## 2022-10-08 DIAGNOSIS — I45.10 UNSPECIFIED RIGHT BUNDLE-BRANCH BLOCK: ICD-10-CM

## 2022-10-08 DIAGNOSIS — Z98.890 OTHER SPECIFIED POSTPROCEDURAL STATES: Chronic | ICD-10-CM

## 2022-10-08 LAB
A1C WITH ESTIMATED AVERAGE GLUCOSE RESULT: 5.7 % — HIGH (ref 4–5.6)
ANION GAP SERPL CALC-SCNC: 9 MMOL/L — SIGNIFICANT CHANGE UP (ref 7–14)
BUN SERPL-MCNC: 16 MG/DL — SIGNIFICANT CHANGE UP (ref 7–23)
CALCIUM SERPL-MCNC: 9 MG/DL — SIGNIFICANT CHANGE UP (ref 8.4–10.5)
CHLORIDE SERPL-SCNC: 108 MMOL/L — HIGH (ref 98–107)
CHOLEST SERPL-MCNC: 151 MG/DL — SIGNIFICANT CHANGE UP
CO2 SERPL-SCNC: 23 MMOL/L — SIGNIFICANT CHANGE UP (ref 22–31)
COVID-19 SPIKE DOMAIN AB INTERP: POSITIVE
COVID-19 SPIKE DOMAIN ANTIBODY RESULT: >250 U/ML — HIGH
CREAT SERPL-MCNC: 0.72 MG/DL — SIGNIFICANT CHANGE UP (ref 0.5–1.3)
EGFR: 101 ML/MIN/1.73M2 — SIGNIFICANT CHANGE UP
ESTIMATED AVERAGE GLUCOSE: 117 — SIGNIFICANT CHANGE UP
GLUCOSE BLDC GLUCOMTR-MCNC: 107 MG/DL — HIGH (ref 70–99)
GLUCOSE BLDC GLUCOMTR-MCNC: 131 MG/DL — HIGH (ref 70–99)
GLUCOSE BLDC GLUCOMTR-MCNC: 97 MG/DL — SIGNIFICANT CHANGE UP (ref 70–99)
GLUCOSE SERPL-MCNC: 125 MG/DL — HIGH (ref 70–99)
HCT VFR BLD CALC: 39.6 % — SIGNIFICANT CHANGE UP (ref 39–50)
HDLC SERPL-MCNC: 54 MG/DL — SIGNIFICANT CHANGE UP
HGB BLD-MCNC: 12.9 G/DL — LOW (ref 13–17)
LIPID PNL WITH DIRECT LDL SERPL: 82 MG/DL — SIGNIFICANT CHANGE UP
MAGNESIUM SERPL-MCNC: 2 MG/DL — SIGNIFICANT CHANGE UP (ref 1.6–2.6)
MCHC RBC-ENTMCNC: 28.7 PG — SIGNIFICANT CHANGE UP (ref 27–34)
MCHC RBC-ENTMCNC: 32.6 GM/DL — SIGNIFICANT CHANGE UP (ref 32–36)
MCV RBC AUTO: 88.2 FL — SIGNIFICANT CHANGE UP (ref 80–100)
NON HDL CHOLESTEROL: 97 MG/DL — SIGNIFICANT CHANGE UP
NRBC # BLD: 0 /100 WBCS — SIGNIFICANT CHANGE UP (ref 0–0)
NRBC # FLD: 0 K/UL — SIGNIFICANT CHANGE UP (ref 0–0)
NT-PROBNP SERPL-SCNC: 248 PG/ML — SIGNIFICANT CHANGE UP
PHOSPHATE SERPL-MCNC: 2.5 MG/DL — SIGNIFICANT CHANGE UP (ref 2.5–4.5)
PLATELET # BLD AUTO: 328 K/UL — SIGNIFICANT CHANGE UP (ref 150–400)
POTASSIUM SERPL-MCNC: 4.5 MMOL/L — SIGNIFICANT CHANGE UP (ref 3.5–5.3)
POTASSIUM SERPL-SCNC: 4.5 MMOL/L — SIGNIFICANT CHANGE UP (ref 3.5–5.3)
RBC # BLD: 4.49 M/UL — SIGNIFICANT CHANGE UP (ref 4.2–5.8)
RBC # FLD: 13.9 % — SIGNIFICANT CHANGE UP (ref 10.3–14.5)
SARS-COV-2 IGG+IGM SERPL QL IA: >250 U/ML — HIGH
SARS-COV-2 IGG+IGM SERPL QL IA: POSITIVE
SARS-COV-2 RNA SPEC QL NAA+PROBE: SIGNIFICANT CHANGE UP
SODIUM SERPL-SCNC: 140 MMOL/L — SIGNIFICANT CHANGE UP (ref 135–145)
TRIGL SERPL-MCNC: 73 MG/DL — SIGNIFICANT CHANGE UP
TROPONIN T, HIGH SENSITIVITY RESULT: 20 NG/L — SIGNIFICANT CHANGE UP
TROPONIN T, HIGH SENSITIVITY RESULT: 22 NG/L — SIGNIFICANT CHANGE UP
WBC # BLD: 10.82 K/UL — HIGH (ref 3.8–10.5)
WBC # FLD AUTO: 10.82 K/UL — HIGH (ref 3.8–10.5)

## 2022-10-08 PROCEDURE — 99232 SBSQ HOSP IP/OBS MODERATE 35: CPT

## 2022-10-08 PROCEDURE — 99223 1ST HOSP IP/OBS HIGH 75: CPT

## 2022-10-08 RX ORDER — SODIUM BICARBONATE 1 MEQ/ML
650 SYRINGE (ML) INTRAVENOUS
Refills: 0 | Status: DISCONTINUED | OUTPATIENT
Start: 2022-10-08 | End: 2022-10-19

## 2022-10-08 RX ORDER — SODIUM CHLORIDE 9 MG/ML
1000 INJECTION, SOLUTION INTRAVENOUS
Refills: 0 | Status: DISCONTINUED | OUTPATIENT
Start: 2022-10-08 | End: 2022-10-17

## 2022-10-08 RX ORDER — QUETIAPINE FUMARATE 200 MG/1
25 TABLET, FILM COATED ORAL EVERY 6 HOURS
Refills: 0 | Status: DISCONTINUED | OUTPATIENT
Start: 2022-10-08 | End: 2022-10-19

## 2022-10-08 RX ORDER — GLUCAGON INJECTION, SOLUTION 0.5 MG/.1ML
1 INJECTION, SOLUTION SUBCUTANEOUS ONCE
Refills: 0 | Status: DISCONTINUED | OUTPATIENT
Start: 2022-10-08 | End: 2022-10-17

## 2022-10-08 RX ORDER — OLANZAPINE 15 MG/1
5 TABLET, FILM COATED ORAL ONCE
Refills: 0 | Status: COMPLETED | OUTPATIENT
Start: 2022-10-08 | End: 2022-10-08

## 2022-10-08 RX ORDER — INSULIN LISPRO 100/ML
VIAL (ML) SUBCUTANEOUS AT BEDTIME
Refills: 0 | Status: DISCONTINUED | OUTPATIENT
Start: 2022-10-08 | End: 2022-10-19

## 2022-10-08 RX ORDER — INSULIN GLARGINE 100 [IU]/ML
20 INJECTION, SOLUTION SUBCUTANEOUS AT BEDTIME
Refills: 0 | Status: DISCONTINUED | OUTPATIENT
Start: 2022-10-08 | End: 2022-10-19

## 2022-10-08 RX ORDER — INSULIN LISPRO 100/ML
VIAL (ML) SUBCUTANEOUS
Refills: 0 | Status: DISCONTINUED | OUTPATIENT
Start: 2022-10-08 | End: 2022-10-19

## 2022-10-08 RX ORDER — POLYETHYLENE GLYCOL 3350 17 G/17G
17 POWDER, FOR SOLUTION ORAL DAILY
Refills: 0 | Status: DISCONTINUED | OUTPATIENT
Start: 2022-10-08 | End: 2022-10-16

## 2022-10-08 RX ORDER — DEXTROSE 50 % IN WATER 50 %
15 SYRINGE (ML) INTRAVENOUS ONCE
Refills: 0 | Status: DISCONTINUED | OUTPATIENT
Start: 2022-10-08 | End: 2022-10-17

## 2022-10-08 RX ORDER — LANOLIN ALCOHOL/MO/W.PET/CERES
3 CREAM (GRAM) TOPICAL AT BEDTIME
Refills: 0 | Status: DISCONTINUED | OUTPATIENT
Start: 2022-10-08 | End: 2022-10-19

## 2022-10-08 RX ORDER — LITHIUM CARBONATE 300 MG/1
300 TABLET, EXTENDED RELEASE ORAL THREE TIMES A DAY
Refills: 0 | Status: DISCONTINUED | OUTPATIENT
Start: 2022-10-08 | End: 2022-10-19

## 2022-10-08 RX ORDER — QUETIAPINE FUMARATE 200 MG/1
100 TABLET, FILM COATED ORAL AT BEDTIME
Refills: 0 | Status: DISCONTINUED | OUTPATIENT
Start: 2022-10-08 | End: 2022-10-12

## 2022-10-08 RX ORDER — QUETIAPINE FUMARATE 200 MG/1
400 TABLET, FILM COATED ORAL AT BEDTIME
Refills: 0 | Status: DISCONTINUED | OUTPATIENT
Start: 2022-10-08 | End: 2022-10-08

## 2022-10-08 RX ORDER — DEXTROSE 50 % IN WATER 50 %
25 SYRINGE (ML) INTRAVENOUS ONCE
Refills: 0 | Status: DISCONTINUED | OUTPATIENT
Start: 2022-10-08 | End: 2022-10-19

## 2022-10-08 RX ORDER — OLANZAPINE 15 MG/1
5 TABLET, FILM COATED ORAL EVERY 6 HOURS
Refills: 0 | Status: DISCONTINUED | OUTPATIENT
Start: 2022-10-08 | End: 2022-10-19

## 2022-10-08 RX ORDER — LANOLIN ALCOHOL/MO/W.PET/CERES
5 CREAM (GRAM) TOPICAL AT BEDTIME
Refills: 0 | Status: DISCONTINUED | OUTPATIENT
Start: 2022-10-08 | End: 2022-10-08

## 2022-10-08 RX ORDER — DEXTROSE 50 % IN WATER 50 %
25 SYRINGE (ML) INTRAVENOUS ONCE
Refills: 0 | Status: DISCONTINUED | OUTPATIENT
Start: 2022-10-08 | End: 2022-10-17

## 2022-10-08 RX ORDER — ASPIRIN/CALCIUM CARB/MAGNESIUM 324 MG
81 TABLET ORAL DAILY
Refills: 0 | Status: DISCONTINUED | OUTPATIENT
Start: 2022-10-08 | End: 2022-10-19

## 2022-10-08 RX ORDER — DEXTROSE 50 % IN WATER 50 %
12.5 SYRINGE (ML) INTRAVENOUS ONCE
Refills: 0 | Status: DISCONTINUED | OUTPATIENT
Start: 2022-10-08 | End: 2022-10-17

## 2022-10-08 RX ORDER — TAMSULOSIN HYDROCHLORIDE 0.4 MG/1
0.4 CAPSULE ORAL AT BEDTIME
Refills: 0 | Status: DISCONTINUED | OUTPATIENT
Start: 2022-10-08 | End: 2022-10-19

## 2022-10-08 RX ADMIN — OLANZAPINE 5 MILLIGRAM(S): 15 TABLET, FILM COATED ORAL at 18:56

## 2022-10-08 RX ADMIN — LITHIUM CARBONATE 300 MILLIGRAM(S): 300 TABLET, EXTENDED RELEASE ORAL at 16:33

## 2022-10-08 RX ADMIN — OLANZAPINE 5 MILLIGRAM(S): 15 TABLET, FILM COATED ORAL at 01:34

## 2022-10-08 NOTE — PROGRESS NOTE ADULT - PROBLEM SELECTOR PLAN 3
MDD/bipolar 1  c/w lithium, quetiapine  confirm with psych re bupropion MDD/bipolar 1  Continue Seroquel  Continue Lithium 300mg tid- monitor BUN/creat, sodium level, *AVOID NSAIDS as per psych  confirm with psych about bupropion.

## 2022-10-08 NOTE — H&P ADULT - HISTORY OF PRESENT ILLNESS
67-year-old male with DM2, bipolar disorder/MDD, BPH, presenting from [facility] w/agitation, not adhering with medication, refusing care.    In the ED VS:98.6  73-81  123-134/78-86  123-134/78-86  18  96-99%RA, received olanzapine 5mg IM x1 67-year-old male with DM2, bipolar 1 disorder/MDD, BPH, presenting from [facility] w/agitation, intermittently refusing medication, refusing care. Reportedly no recent changes in medications. Patient has a history of prior psychiatric hospitalizations. Patient is without complaint, disorganized thoughts, limited historian.    In the ED VS:98.6  73-81  123-134/78-86  123-134/78-86  18  96-99%RA, received olanzapine 5mg IM x1

## 2022-10-08 NOTE — PROGRESS NOTE ADULT - SUBJECTIVE AND OBJECTIVE BOX
Patient is a 67y old  Male who presents with a chief complaint of agitation (08 Oct 2022 05:40)      SUBJECTIVE / OVERNIGHT EVENTS:    No events overnight. This AM, patient without n/v/d/cp/sob.  He reports feeling well and states "thank you very much, Peace." Otherwise does not answer appropriately and intermittently follows commands.     MEDICATIONS  (STANDING):  aspirin enteric coated 81 milliGRAM(s) Oral daily  dextrose 5%. 1000 milliLiter(s) (50 mL/Hr) IV Continuous <Continuous>  dextrose 5%. 1000 milliLiter(s) (100 mL/Hr) IV Continuous <Continuous>  dextrose 50% Injectable 25 Gram(s) IV Push once  dextrose 50% Injectable 12.5 Gram(s) IV Push once  dextrose 50% Injectable 25 Gram(s) IV Push once  glucagon  Injectable 1 milliGRAM(s) IntraMuscular once  insulin glargine Injectable (LANTUS) 20 Unit(s) SubCutaneous at bedtime  insulin lispro (ADMELOG) corrective regimen sliding scale   SubCutaneous three times a day before meals  insulin lispro (ADMELOG) corrective regimen sliding scale   SubCutaneous at bedtime  lithium 300 milliGRAM(s) Oral three times a day  melatonin 3 milliGRAM(s) Oral at bedtime  polyethylene glycol 3350 17 Gram(s) Oral daily  QUEtiapine 100 milliGRAM(s) Oral at bedtime  sodium bicarbonate 650 milliGRAM(s) Oral two times a day  tamsulosin 0.4 milliGRAM(s) Oral at bedtime    MEDICATIONS  (PRN):  bisacodyl 5 milliGRAM(s) Oral every 12 hours PRN Constipation  dextrose Oral Gel 15 Gram(s) Oral once PRN Blood Glucose LESS THAN 70 milliGRAM(s)/deciliter  OLANZapine Injectable 5 milliGRAM(s) IntraMuscular every 6 hours PRN Severe agitation  QUEtiapine 25 milliGRAM(s) Oral every 6 hours PRN Anxiety/agitation      PHYSICAL EXAM:  T(C): 36.7 (10-08-22 @ 19:31), Max: 37 (10-07-22 @ 21:02)  HR: 75 (10-08-22 @ 19:31) (73 - 81)  BP: 130/61 (10-08-22 @ 19:31) (113/72 - 142/74)  RR: 18 (10-08-22 @ 19:31) (17 - 19)  SpO2: 100% (10-08-22 @ 19:31) (96% - 100%)  I&O's Summary    GENERAL: NAD, well-developed  HEAD:  Atraumatic, Normocephalic, MMM  CHEST/LUNG: No use of accessory muscles, CTAB, breathing non-labored  COR: RR, no mrcg  ABD: Soft, ND/NT, +BS  PSYCH: AAOx3  NEUROLOGY: CN II-XII grossly intact, moving all extremities  SKIN: No rashes or lesions  EXT: wwp, no cce    LABS:  CAPILLARY BLOOD GLUCOSE  107 (08 Oct 2022 10:12)      POCT Blood Glucose.: 131 mg/dL (08 Oct 2022 17:15)  POCT Blood Glucose.: 97 mg/dL (08 Oct 2022 12:40)  POCT Blood Glucose.: 107 mg/dL (08 Oct 2022 08:40)  POCT Blood Glucose.: 122 mg/dL (07 Oct 2022 22:22)                          12.9   10.82 )-----------( 328      ( 08 Oct 2022 10:20 )             39.6     10-08    140  |  108<H>  |  16  ----------------------------<  125<H>  4.5   |  23  |  0.72    Ca    9.0      08 Oct 2022 10:20  Phos  2.5     10-08  Mg     2.00     10-08    TPro  6.5  /  Alb  3.7  /  TBili  0.3  /  DBili  x   /  AST  15  /  ALT  13  /  AlkPhos  96  10-07                RADIOLOGY & ADDITIONAL TESTS:    Telemetry Personally Reviewed -     Imaging Personally Reviewed -     Imaging Reviewed -     Consultant(s) Notes Reviewed -       Care Discussed with Consultants/Other Providers -

## 2022-10-08 NOTE — PROGRESS NOTE ADULT - PROBLEM SELECTOR PLAN 1
new in comparison to EKG from '09, trop 20, rejected by psych for further w/u, repeat troponin pending  check pro-BNP, echo  monitor on tele  check FLP, A1c  TSH wnl new in comparison to EKG from '09, trop 20, psych requesting further w/u   troponin 22/20, pt asymptomatic       Echo still pending  monitor on tele  will keep K>4 and Mg>2.

## 2022-10-08 NOTE — PROGRESS NOTE ADULT - ASSESSMENT
67-year-old male with DM2, bipolar 1 disorder/MDD, BPH, presenting from [facility] w/agitation, intermittently refusing medication, refusing care.

## 2022-10-08 NOTE — BH CONSULTATION LIAISON PROGRESS NOTE - NSBHASSESSMENTFT_PSY_ALL_CORE
66yo single, disabled, male currently residing at [facility]. PPHx Bipolar 1 Disorder, multiple inpatient admissions- last as few years ago at [facility], multiple past suicide attempts, + history of aggressive behavior in the context of non compliance with medication. No legal issues or substance use issues. PMHx DM2 (insulin dependent), constipation, generalized muscle weakness, BPH. BIBA referred by SNF for agitation.    Patient presents to the ED in the context of agitation. Patient has been non compliant with medications, refusing ADLs and not caring for self. He is agitated in ED, psychotic and disorganized. He is acutely decompensated from baseline with poor insight and unpredictable behavior. He requires inpatient admission for safety and stabilization.    10/8: patient seen for follow up today, hyperverbal, illogical, disorganized, verbally dismissive and aggressive towards CL team, refusing to speak with providers, patient states "psychiatry is no good," staff report no violence or aggressive at present, 1:1 constant observation maintained.    PLAN:  -c/w 1:1 constant observation-elopement risk, disorganized  -c/w Lithium 300mg tid-monitor bun/creat, sodium level, *AVOID NSAIDS  -c/w Bupropion 100mg daily  -c/w Melatonin 5mg hs  -CHANGE Seroquel to 100mg hs-hold if qtc >500  -c/w Anxiety/Mild Agitation: Seroquel 25mg q6h prn  -c/w Severe Agitation: Zyprexa 5mg q6h prn-DO NOT GIVEN Ativan IV/IM within 1 hour of Zyprexa IM d/t risk of sedation and or respiratory depression  -Monitor EKG qtc interval  -CL to follow  -Patient CANNOT leave AMA   66yo single, disabled, male currently residing at [facility]. PPHx Bipolar 1 Disorder, multiple inpatient admissions- last as few years ago at [place], multiple past suicide attempts, + history of aggressive behavior in the context of non compliance with medication. No legal issues or substance use issues. PMHx DM2 (insulin dependent), constipation, generalized muscle weakness, BPH. BIBA referred by SNF for agitation.    Patient presents to the ED in the context of agitation. Patient has been non compliant with medications, refusing ADLs and not caring for self. He is agitated in ED, psychotic and disorganized. He is acutely decompensated from baseline with poor insight and unpredictable behavior. He requires inpatient admission for safety and stabilization.    10/8: patient seen for follow up today, hyperverbal, illogical, disorganized, verbally dismissive and aggressive towards CL team, refusing to speak with providers, patient states "psychiatry is no good," staff report no violence or aggressive at present, 1:1 constant observation maintained.    PLAN:  -c/w 1:1 constant observation-elopement risk, disorganized  -c/w Lithium 300mg tid-monitor bun/creat, sodium level, *AVOID NSAIDS  -c/w Melatonin 5mg hs  -CHANGE Seroquel to 100mg hs-hold if qtc >500  -c/w Anxiety/Mild Agitation: Seroquel 25mg q6h prn  -c/w Severe Agitation: Zyprexa 5mg IM q6h prn-DO NOT GIVEN Ativan IV/IM within 1 hour of Zyprexa IM d/t risk of sedation and or respiratory depression  -Monitor EKG qtc interval, HOLD antipsychotic if qtc >500  -CL to follow  -Patient CANNOT leave AMA

## 2022-10-08 NOTE — BH CONSULTATION LIAISON PROGRESS NOTE - ATTENDING COMMENTS
Chart reviewed, patient seen/evaluated virtually with Althea Chiu NP via tele-video platform,  I agree with above assessment/plan. Patient alert, uncooperative, making illogical statements and disorganized. Patient unable to engage in meaningful interview. Plan as above, will follow

## 2022-10-08 NOTE — H&P ADULT - NSHPLABSRESULTS_GEN_ALL_CORE
EKG personally reviewed - 69bpm SR w/1st degree AVB, RBBB, TWI V1-V3; QTc 456ms (new RBBB/TWI in comparison to prior for 2009) 13.5   9.82  )-----------( 338      ( 07 Oct 2022 15:04 )             41.9     10-07    141  |  108<H>  |  19  ----------------------------<  143<H>  4.2   |  22  |  0.72    Ca    9.4      07 Oct 2022 15:04    TPro  6.5  /  Alb  3.7  /  TBili  0.3  /  DBili  x   /  AST  15  /  ALT  13  /  AlkPhos  96  10-07    Thyroid Stimulating Hormone, Serum: 1.53 uIU/mL (10.07.22 @ 15:04)    Troponin T, High Sensitivity Result: 20 ng/L (10.07.22 @ 15:04)    Salicylate Level, Serum: <0.3 mg/dL (10.07.22 @ 15:04)  Alcohol, Blood: <10 mg/dL (10.07.22 @ 15:04)  Acetaminophen Level, Serum: <10 ug/mL (10.07.22 @ 15:04)    Lithium Level, Serum: 0.5 mmol/L (10.07.22 @ 15:04)    COVID-19 PCR: NotDetec (10.07.22 @ 14:51)    EKG personally reviewed - 69bpm SR w/1st degree AVB, RBBB, TWI V1-V3; QTc 456ms (new RBBB/TWI in comparison to prior for 2009)

## 2022-10-08 NOTE — BH CONSULTATION LIAISON PROGRESS NOTE - NSBHFUPINTERVALHXFT_PSY_A_CORE
Chart reviewed. Patient seen by writer and CL attending, Dr. Christian, patient refused to speak to psychiatrist and NP stating "psychiatry is no good," patient become agitated and flailing arms in motion to leave side of bed, patient noted to be hyperverbal and speaking about living in [place] when younger as well as being in a psychiatric facility, +racing thoughts, disorganized and illogical at present. PCA at bedside maintaining 1:1 constant observation. Awaiting bed assignment.    Staff report patient talking incessantly, non violent or aggressive at present, no prn's given today.

## 2022-10-08 NOTE — H&P ADULT - PROBLEM SELECTOR PLAN 1
trop 20, repeat pending  check pro-BNP, echo  monitor on tele  check FLP, A1c  TSH wnl new in comparison to EKG from '09, trop 20, rejected by psych for further w/u, repeat troponin pending  check pro-BNP, echo  monitor on tele  check FLP, A1c  TSH wnl

## 2022-10-08 NOTE — PROGRESS NOTE ADULT - PROBLEM SELECTOR PLAN 4
decreased home dose of insulin glargine by 20%  if not eating meals today and FS controlled would d/c insulin  FS QAC/HS w/ISS decreased home dose of insulin glargine by 20% on admission     Continue Lantus 20u at bedtime  Continue ISS for now.

## 2022-10-08 NOTE — PROGRESS NOTE ADULT - PROBLEM SELECTOR PLAN 2
plan per psych   c/w 1:1 Management as per psych    c/w 1:1 enhanced supervision    Psych recommends the following:   Continue Melatonin 5mg at bedtime and Seroquel to 100mg hs-hold if qtc >500  For Anxiety/Mild Agitation: Seroquel 25mg q6h prn  For severe Agitation: Zyprexa 5mg IM q6h prn-DO NOT GIVE Ativan IV/IM within 1 hour of Zyprexa IM due to risk of sedation and/or respiratory depression  Monitor EKG qtc interval, HOLD antipsychotic if qtc >500.

## 2022-10-08 NOTE — H&P ADULT - NSHPREVIEWOFSYSTEMS_GEN_ALL_CORE
Per notes from facility:    Ambulates with assistive device  Requires assistance with all ADLs  Incontinent of urine Per notes from facility:    Ambulates with assistive device  Requires assistance with all ADLs  Incontinent of urine    REVIEW OF SYSTEMS:    CONSTITUTIONAL: No weakness, fevers or chills  EYES/ENT: No visual changes; No dysphagia; No sore throat; No rhinorrhea; No sinus pain/pressure  NECK: No pain or stiffness  RESPIRATORY: No cough, wheezing, hemoptysis; No shortness of breath  CARDIOVASCULAR: No chest pain or palpitations; No lower extremity edema  GASTROINTESTINAL: No abdominal or epigastric pain. No nausea, vomiting, or hematemesis; No diarrhea or constipation. No melena or hematochezia.  GENITOURINARY: No dysuria, frequency or hematuria  NEUROLOGICAL: No numbness, paresthesias, or weakness; No HA; No LH/dizziness  MSK: ambulates with  SKIN: No itching, burning, rashes, or lesions   All other review of systems is negative unless indicated above. Per notes from facility:    Ambulates with assistive device  Requires assistance with all ADLs  Incontinent of urine    REVIEW OF SYSTEMS:    CONSTITUTIONAL: No weakness, fevers or chills  EYES/ENT: No visual changes; No dysphagia; No sore throat; No rhinorrhea; No sinus pain/pressure  NECK: No pain or stiffness  RESPIRATORY: No cough, wheezing, hemoptysis; No shortness of breath  CARDIOVASCULAR: No chest pain or palpitations; No lower extremity edema  GASTROINTESTINAL: No abdominal or epigastric pain. No nausea, vomiting, or hematemesis; No diarrhea or constipation. No melena or hematochezia.  GENITOURINARY: No dysuria, frequency or hematuria  NEUROLOGICAL: No numbness, paresthesias, or weakness; No HA; No LH/dizziness  MSK: ambulates with walker/assist; no falls  SKIN: No itching, burning, rashes, or lesions   All other review of systems is negative unless indicated above.

## 2022-10-08 NOTE — H&P ADULT - ASSESSMENT
67-year-old male with DM2, bipolar 1 disorder/MDD, BPH, presenting from [facility] w/agitation, intermittently refusing medication, refusing care.

## 2022-10-08 NOTE — H&P ADULT - PROBLEM SELECTOR PLAN 4
PATIENT STATES HE IS FEELING BACK TO BASELINE AND STILL NSR ON MONITOR, /83, HR 88 decreased home dose of insulin glargine by 20%  if not eating meals today and FS controlled would d/c insulin  FS QAC/HS w/ISS

## 2022-10-08 NOTE — H&P ADULT - NSHPPHYSICALEXAM_GEN_ALL_CORE
Vital Signs Last 24 Hrs  T(C): 36.4 (08 Oct 2022 02:41), Max: 37 (07 Oct 2022 21:02)  T(F): 97.5 (08 Oct 2022 02:41), Max: 98.6 (07 Oct 2022 21:02)  HR: 81 (08 Oct 2022 02:41) (73 - 81)  BP: 142/74 (08 Oct 2022 02:41) (123/78 - 142/74)  RR: 19 (08 Oct 2022 02:41) (18 - 19)  SpO2: 100% (08 Oct 2022 02:41) (96% - 100%)    Parameters below as of 08 Oct 2022 02:41  Patient On (Oxygen Delivery Method): room air    PHYSICAL EXAM:  GENERAL: NAD, well-developed, well-nourished  HEAD:  Atraumatic, Normocephalic  EYES: EOMI, PERRL, conjunctiva and sclera clear  NECK: Supple, No JVD  CHEST/LUNG: Clear to auscultation bilaterally; No wheezes, rales or rhonchi; normal work of breathing, speaking in full sentences  HEART: Regular rate and rhythm; No murmurs, rubs, or gallops, (+)S1, S2  ABDOMEN: Soft, Nontender, Nondistended; Normal Bowel sounds   EXTREMITIES:  2+ Peripheral Pulses, No clubbing, cyanosis, or edema  PSYCH: normal mood and affect, A&Ox3  NEUROLOGY: no focal neuro deficits  SKIN: No rashes or lesions

## 2022-10-09 LAB
GLUCOSE BLDC GLUCOMTR-MCNC: 176 MG/DL — HIGH (ref 70–99)
GLUCOSE BLDC GLUCOMTR-MCNC: 201 MG/DL — HIGH (ref 70–99)

## 2022-10-09 PROCEDURE — 99232 SBSQ HOSP IP/OBS MODERATE 35: CPT

## 2022-10-09 RX ORDER — ENOXAPARIN SODIUM 100 MG/ML
40 INJECTION SUBCUTANEOUS EVERY 24 HOURS
Refills: 0 | Status: DISCONTINUED | OUTPATIENT
Start: 2022-10-09 | End: 2022-10-19

## 2022-10-09 RX ADMIN — QUETIAPINE FUMARATE 25 MILLIGRAM(S): 200 TABLET, FILM COATED ORAL at 08:14

## 2022-10-09 RX ADMIN — LITHIUM CARBONATE 300 MILLIGRAM(S): 300 TABLET, EXTENDED RELEASE ORAL at 08:13

## 2022-10-09 RX ADMIN — LITHIUM CARBONATE 300 MILLIGRAM(S): 300 TABLET, EXTENDED RELEASE ORAL at 22:05

## 2022-10-09 RX ADMIN — Medication 650 MILLIGRAM(S): at 17:28

## 2022-10-09 RX ADMIN — INSULIN GLARGINE 20 UNIT(S): 100 INJECTION, SOLUTION SUBCUTANEOUS at 23:47

## 2022-10-09 RX ADMIN — Medication 3 MILLIGRAM(S): at 22:05

## 2022-10-09 RX ADMIN — TAMSULOSIN HYDROCHLORIDE 0.4 MILLIGRAM(S): 0.4 CAPSULE ORAL at 22:05

## 2022-10-09 RX ADMIN — QUETIAPINE FUMARATE 100 MILLIGRAM(S): 200 TABLET, FILM COATED ORAL at 22:05

## 2022-10-09 RX ADMIN — Medication 650 MILLIGRAM(S): at 08:14

## 2022-10-09 RX ADMIN — LITHIUM CARBONATE 300 MILLIGRAM(S): 300 TABLET, EXTENDED RELEASE ORAL at 14:55

## 2022-10-09 RX ADMIN — OLANZAPINE 5 MILLIGRAM(S): 15 TABLET, FILM COATED ORAL at 23:03

## 2022-10-09 RX ADMIN — OLANZAPINE 5 MILLIGRAM(S): 15 TABLET, FILM COATED ORAL at 03:56

## 2022-10-09 NOTE — PROGRESS NOTE ADULT - PROBLEM SELECTOR PLAN 2
plan per psych   c/w 1:1 Management as per psych    c/w 1:1 enhanced supervision    Psych recommends the following:   Continue Melatonin 5mg at bedtime and Seroquel to 100mg hs-hold if qtc >500  For Anxiety/Mild Agitation: Seroquel 25mg q6h prn  For severe Agitation: Zyprexa 5mg IM q6h prn-DO NOT GIVE Ativan IV/IM within 1 hour of Zyprexa IM due to risk of sedation and/or respiratory depression  Monitor EKG qtc interval, HOLD antipsychotic if qtc >500

## 2022-10-09 NOTE — BH CONSULTATION LIAISON PROGRESS NOTE - NSBHCONSULTPRIMARYDISCUSSNO_PSY_A_CORE FT
NAVIGATE SEE Outgoing Telephone Call  For Supported Employment & Education    NAVIGATE Enrollee: Johnny Moraes (1998)     MRN: [id number]  Date of Call: 5/3/2017  Contacted: Johnny's voicemail    Discussed:   Radha rueda voicemail RE: how school assignment from yesterday went.     Anais Quiroz  
awaiting call back
acp paged

## 2022-10-09 NOTE — CHART NOTE - NSCHARTNOTEFT_GEN_A_CORE
[facility]IC[facility] COVERAGE    Notified by RN that pt had episode of emesis, brown in color, pt also agitated trying to kick at staff.  Pt on 1:1, PRN Zyprexa ordered to be given.  Pt assessed at bedside in ED, brown emesis noted on bed sheet, no digested blood or coffee ground appearance noted.  Pt denies nausea, or abdominal pain.  Pt demanding food.  Explained we don't want to give high amounts of food if he is vomiting.  , Lantus was given.  Will monitor for additional vomiting episodes.  RN aware of plan.  Pt stable at this time, will continue to monitor.    Perry Aguilar PA-C  Department of Medicine - [facility]  In-House Pager: #[phone]

## 2022-10-09 NOTE — PROGRESS NOTE ADULT - PROBLEM SELECTOR PLAN 4
decreased home dose of insulin glargine by 20%  if not eating meals today and FS controlled would d/c insulin  FS QAC/HS w/ISS decreased home dose of insulin glargine by 20% on admission     Continue Lantus 20u at bedtime  Continue ISS for now

## 2022-10-09 NOTE — BH CONSULTATION LIAISON PROGRESS NOTE - NSBHCHARTREVIEWLAB_PSY_A_CORE FT
12.9   10.82 )-----------( 328      ( 08 Oct 2022 10:20 )             39.6   10-08    140  |  108<H>  |  16  ----------------------------<  125<H>  4.5   |  23  |  0.72    Ca    9.0      08 Oct 2022 10:20  Phos  2.5     10-08  Mg     2.00     10-08    TPro  6.5  /  Alb  3.7  /  TBili  0.3  /  DBili  x   /  AST  15  /  ALT  13  /  AlkPhos  96  10-07    Lithium Level, Serum (10.07.22 @ 15:04)   Lithium Level, Serum: 0.5 mmol/L   
CBC Full  -  ( 08 Oct 2022 10:20 )  WBC Count : 10.82 K/uL  RBC Count : 4.49 M/uL  Hemoglobin : 12.9 g/dL  Hematocrit : 39.6 %  Platelet Count - Automated : 328 K/uL  Mean Cell Volume : 88.2 fL  Mean Cell Hemoglobin : 28.7 pg  Mean Cell Hemoglobin Concentration : 32.6 gm/dL  Auto Neutrophil # : x  Auto Lymphocyte # : x  Auto Monocyte # : x  Auto Eosinophil # : x  Auto Basophil # : x  Auto Neutrophil % : x  Auto Lymphocyte % : x  Auto Monocyte % : x  Auto Eosinophil % : x  Auto Basophil % : x  10-08    140  |  108<H>  |  16  ----------------------------<  125<H>  4.5   |  23  |  0.72    Ca    9.0      08 Oct 2022 10:20  Phos  2.5     10-08  Mg     2.00     10-08    TPro  6.5  /  Alb  3.7  /  TBili  0.3  /  DBili  x   /  AST  15  /  ALT  13  /  AlkPhos  96  10-07

## 2022-10-09 NOTE — PROGRESS NOTE ADULT - SUBJECTIVE AND OBJECTIVE BOX
Patient is a 67y old  Male who presents with a chief complaint of agitation (08 Oct 2022 20:01)      SUBJECTIVE / OVERNIGHT EVENTS:    No events overnight. This AM, patient without n/v/d/cp/sob.      MEDICATIONS  (STANDING):  aspirin enteric coated 81 milliGRAM(s) Oral daily  dextrose 5%. 1000 milliLiter(s) (50 mL/Hr) IV Continuous <Continuous>  dextrose 5%. 1000 milliLiter(s) (100 mL/Hr) IV Continuous <Continuous>  dextrose 50% Injectable 25 Gram(s) IV Push once  dextrose 50% Injectable 12.5 Gram(s) IV Push once  dextrose 50% Injectable 25 Gram(s) IV Push once  glucagon  Injectable 1 milliGRAM(s) IntraMuscular once  insulin glargine Injectable (LANTUS) 20 Unit(s) SubCutaneous at bedtime  insulin lispro (ADMELOG) corrective regimen sliding scale   SubCutaneous three times a day before meals  insulin lispro (ADMELOG) corrective regimen sliding scale   SubCutaneous at bedtime  lithium 300 milliGRAM(s) Oral three times a day  melatonin 3 milliGRAM(s) Oral at bedtime  polyethylene glycol 3350 17 Gram(s) Oral daily  QUEtiapine 100 milliGRAM(s) Oral at bedtime  sodium bicarbonate 650 milliGRAM(s) Oral two times a day  tamsulosin 0.4 milliGRAM(s) Oral at bedtime    MEDICATIONS  (PRN):  bisacodyl 5 milliGRAM(s) Oral every 12 hours PRN Constipation  dextrose Oral Gel 15 Gram(s) Oral once PRN Blood Glucose LESS THAN 70 milliGRAM(s)/deciliter  OLANZapine Injectable 5 milliGRAM(s) IntraMuscular every 6 hours PRN Severe agitation  QUEtiapine 25 milliGRAM(s) Oral every 6 hours PRN Anxiety/agitation      PHYSICAL EXAM:  T(C): 36.8 (10-09-22 @ 08:02), Max: 36.8 (10-09-22 @ 08:02)  HR: 69 (10-09-22 @ 08:02) (69 - 79)  BP: 123/76 (10-09-22 @ 08:02) (113/72 - 140/81)  RR: 18 (10-09-22 @ 08:02) (17 - 18)  SpO2: 100% (10-09-22 @ 08:02) (100% - 100%)  I&O's Summary    GENERAL: NAD, well-developed  HEAD:  Atraumatic, Normocephalic, MMM  CHEST/LUNG: No use of accessory muscles, CTAB, breathing non-labored  COR: RR, no mrcg  ABD: Soft, ND/NT, +BS  PSYCH: AAOx3  NEUROLOGY: CN II-XII grossly intact, moving all extremities  SKIN: No rashes or lesions  EXT: wwp, no cce    LABS:  CAPILLARY BLOOD GLUCOSE  107 (08 Oct 2022 10:12)      POCT Blood Glucose.: 201 mg/dL (09 Oct 2022 03:04)  POCT Blood Glucose.: 131 mg/dL (08 Oct 2022 17:15)  POCT Blood Glucose.: 97 mg/dL (08 Oct 2022 12:40)                          12.9   10.82 )-----------( 328      ( 08 Oct 2022 10:20 )             39.6     10-08    140  |  108<H>  |  16  ----------------------------<  125<H>  4.5   |  23  |  0.72    Ca    9.0      08 Oct 2022 10:20  Phos  2.5     10-08  Mg     2.00     10-08    TPro  6.5  /  Alb  3.7  /  TBili  0.3  /  DBili  x   /  AST  15  /  ALT  13  /  AlkPhos  96  10-07                RADIOLOGY & ADDITIONAL TESTS:    Telemetry Personally Reviewed -     Imaging Personally Reviewed -     Imaging Reviewed -     Consultant(s) Notes Reviewed -       Care Discussed with Consultants/Other Providers -  Patient is a 67y old  Male who presents with a chief complaint of agitation (08 Oct 2022 20:01)      SUBJECTIVE / OVERNIGHT EVENTS:    No events overnight. This AM, patient without n/v/d/cp/sob.  Patient seen this morning and continues to talk to himself. He asked" isn't beautiful while pointing to himself." As per aide at bedside, pt has been talking to himself and has not exhibited any aggressive behavior.     he is able to follow commands intermittently and denies auditory or visual hallucinations    MEDICATIONS  (STANDING):  aspirin enteric coated 81 milliGRAM(s) Oral daily  dextrose 5%. 1000 milliLiter(s) (50 mL/Hr) IV Continuous <Continuous>  dextrose 5%. 1000 milliLiter(s) (100 mL/Hr) IV Continuous <Continuous>  dextrose 50% Injectable 25 Gram(s) IV Push once  dextrose 50% Injectable 12.5 Gram(s) IV Push once  dextrose 50% Injectable 25 Gram(s) IV Push once  glucagon  Injectable 1 milliGRAM(s) IntraMuscular once  insulin glargine Injectable (LANTUS) 20 Unit(s) SubCutaneous at bedtime  insulin lispro (ADMELOG) corrective regimen sliding scale   SubCutaneous three times a day before meals  insulin lispro (ADMELOG) corrective regimen sliding scale   SubCutaneous at bedtime  lithium 300 milliGRAM(s) Oral three times a day  melatonin 3 milliGRAM(s) Oral at bedtime  polyethylene glycol 3350 17 Gram(s) Oral daily  QUEtiapine 100 milliGRAM(s) Oral at bedtime  sodium bicarbonate 650 milliGRAM(s) Oral two times a day  tamsulosin 0.4 milliGRAM(s) Oral at bedtime    MEDICATIONS  (PRN):  bisacodyl 5 milliGRAM(s) Oral every 12 hours PRN Constipation  dextrose Oral Gel 15 Gram(s) Oral once PRN Blood Glucose LESS THAN 70 milliGRAM(s)/deciliter  OLANZapine Injectable 5 milliGRAM(s) IntraMuscular every 6 hours PRN Severe agitation  QUEtiapine 25 milliGRAM(s) Oral every 6 hours PRN Anxiety/agitation      PHYSICAL EXAM:  T(C): 36.8 (10-09-22 @ 08:02), Max: 36.8 (10-09-22 @ 08:02)  HR: 69 (10-09-22 @ 08:02) (69 - 79)  BP: 123/76 (10-09-22 @ 08:02) (113/72 - 140/81)  RR: 18 (10-09-22 @ 08:02) (17 - 18)  SpO2: 100% (10-09-22 @ 08:02) (100% - 100%)  I&O's Summary    GENERAL: NAD, well-developed, elderly male  HEAD:  Atraumatic, Normocephalic, MMM  CHEST/LUNG: No use of accessory muscles, CTAB, breathing non-labored  COR: RR, no mrcg  ABD: Soft, ND/NT, +BS  PSYCH: AAOx1, intermittently follows commands  NEUROLOGY: CN II-XII grossly intact, moving all extremities  SKIN: No rashes or lesions  EXT: wwp, no cce    LABS:  CAPILLARY BLOOD GLUCOSE  107 (08 Oct 2022 10:12)      POCT Blood Glucose.: 201 mg/dL (09 Oct 2022 03:04)  POCT Blood Glucose.: 131 mg/dL (08 Oct 2022 17:15)  POCT Blood Glucose.: 97 mg/dL (08 Oct 2022 12:40)                          12.9   10.82 )-----------( 328      ( 08 Oct 2022 10:20 )             39.6     10-08    140  |  108<H>  |  16  ----------------------------<  125<H>  4.5   |  23  |  0.72    Ca    9.0      08 Oct 2022 10:20  Phos  2.5     10-08  Mg     2.00     10-08    TPro  6.5  /  Alb  3.7  /  TBili  0.3  /  DBili  x   /  AST  15  /  ALT  13  /  AlkPhos  96  10-07                RADIOLOGY & ADDITIONAL TESTS:    Consultant(s) Notes Reviewed -        Care Discussed with Consultants/Other Providers -

## 2022-10-09 NOTE — BH CONSULTATION LIAISON PROGRESS NOTE - NSBHASSESSMENTFT_PSY_ALL_CORE
68yo single, disabled, male currently residing at [facility]. PPHx Bipolar 1 Disorder, multiple inpatient admissions- last as few years ago at [place], multiple past suicide attempts, + history of aggressive behavior in the context of non compliance with medication. No legal issues or substance use issues. PMHx DM2 (insulin dependent), constipation, generalized muscle weakness, BPH. BIBA referred by SNF for agitation.    Patient presents to the ED in the context of agitation. Patient has been non compliant with medications, refusing ADLs and not caring for self. He is agitated in ED, psychotic and disorganized. He is acutely decompensated from baseline with poor insight and unpredictable behavior. He requires inpatient admission for safety and stabilization.    10/8: patient seen for follow up today, hyperverbal, illogical, disorganized, verbally dismissive and aggressive towards CL team, refusing to speak with providers, patient states "psychiatry is no good," staff report no violence or aggressive at present, 1:1 constant observation maintained.  10/9: pt seen for f/u, 1:1 at bedside. per staff, pt did not sleep at all overnight. pt uncooperative with interview, states he does not need psychiatry because he is not crazy. required prn Zyprexa 5mg IM x1 overnight. Serum lithium level 0.5 on arrival. Pending medical work up of new RBBB on EKG compared to prior; ordered for echo.    PLAN:  -c/w 1:1 constant observation-elopement risk, disorganization  -c/w Lithium 300mg tid-monitor bun/creat, sodium level, *AVOID NSAIDS  -c/w Melatonin 5mg hs  -c/w Seroquel to 100mg hs-hold if qtc >500  -c/w Anxiety/Mild Agitation: Seroquel 25mg q6h prn  -c/w Severe Agitation: Zyprexa 5mg IM q6h prn-DO NOT GIVEN Ativan IV/IM within 1 hour of Zyprexa IM d/t risk of sedation and or respiratory depression  -Monitor EKG qtc interval, HOLD antipsychotic if qtc >500  -CL to follow  -Patient CANNOT leave AMA

## 2022-10-09 NOTE — PROGRESS NOTE ADULT - PROBLEM SELECTOR PLAN 1
new in comparison to EKG from '09, trop 20, rejected by psych for further w/u, repeat troponin pending  check pro-BNP, echo  monitor on tele  check FLP, A1c  TSH wnl new in comparison to EKG from '09, trop 20, psych requesting further w/u   troponin 22/20, pt asymptomatic       Echo still pending  monitor on tele  will keep K>4 and Mg>2

## 2022-10-09 NOTE — PROGRESS NOTE ADULT - ASSESSMENT
67-year-old male with DM2, bipolar 1 disorder/MDD, BPH, presenting from [facility] w/agitation, intermittently refusing medication, refusing care.  67-year-old male with DM2, bipolar 1 disorder/MDD, BPH, presenting from [facility] w/agitation, intermittently refusing medication, refusing care. admitted for further evaluation.

## 2022-10-09 NOTE — BH CONSULTATION LIAISON PROGRESS NOTE - NSBHFUPINTERVALHXFT_PSY_A_CORE
Chart reviewed. Pt received PRN Zyprexa 5mg IM x2 and Seroquel 25mg PO x 1 in the last 24 hours for agitation. Refusing most medications including insulin; has accepted 2 doses of lithium since arrival. VSS.     Pt seen in ED, with 1:1 at bedside. Pt becomes immediately upset when he hears writer is from psychiatry. Yells, "I don't need psychiatry, I'm not crazy!" Largely uncooperative with interview. Pt states that he slept well, though per 1:1 at bedside, pt did not sleep at all overnight. Pt becomes irritated when CO says that, shouts "don't talk about me." Pt begins to eat breakfast. States that he feels fine. Declines to answer further questions about SI/HI or AVH. Awaiting bed assignment.

## 2022-10-09 NOTE — PROGRESS NOTE ADULT - PROBLEM SELECTOR PLAN 3
MDD/bipolar 1  c/w lithium, quetiapine  confirm with psych re bupropion MDD/bipolar 1  Continue Lithium 300mg tid- monitor BUN/creat, sodium level, *AVOID NSAIDS as per psych  confirm with psych about bupropion

## 2022-10-10 DIAGNOSIS — R45.1 RESTLESSNESS AND AGITATION: ICD-10-CM

## 2022-10-10 DIAGNOSIS — N40.0 BENIGN PROSTATIC HYPERPLASIA WITHOUT LOWER URINARY TRACT SYMPTOMS: ICD-10-CM

## 2022-10-10 LAB
GLUCOSE BLDC GLUCOMTR-MCNC: 177 MG/DL — HIGH (ref 70–99)
GLUCOSE BLDC GLUCOMTR-MCNC: 205 MG/DL — HIGH (ref 70–99)

## 2022-10-10 PROCEDURE — 99232 SBSQ HOSP IP/OBS MODERATE 35: CPT

## 2022-10-10 PROCEDURE — 99233 SBSQ HOSP IP/OBS HIGH 50: CPT

## 2022-10-10 RX ADMIN — Medication 3 MILLIGRAM(S): at 21:51

## 2022-10-10 RX ADMIN — QUETIAPINE FUMARATE 100 MILLIGRAM(S): 200 TABLET, FILM COATED ORAL at 21:51

## 2022-10-10 RX ADMIN — Medication 2: at 18:04

## 2022-10-10 RX ADMIN — Medication 650 MILLIGRAM(S): at 17:34

## 2022-10-10 RX ADMIN — OLANZAPINE 5 MILLIGRAM(S): 15 TABLET, FILM COATED ORAL at 05:19

## 2022-10-10 RX ADMIN — ENOXAPARIN SODIUM 40 MILLIGRAM(S): 100 INJECTION SUBCUTANEOUS at 17:34

## 2022-10-10 RX ADMIN — TAMSULOSIN HYDROCHLORIDE 0.4 MILLIGRAM(S): 0.4 CAPSULE ORAL at 21:51

## 2022-10-10 RX ADMIN — Medication 81 MILLIGRAM(S): at 17:50

## 2022-10-10 RX ADMIN — LITHIUM CARBONATE 300 MILLIGRAM(S): 300 TABLET, EXTENDED RELEASE ORAL at 21:51

## 2022-10-10 RX ADMIN — LITHIUM CARBONATE 300 MILLIGRAM(S): 300 TABLET, EXTENDED RELEASE ORAL at 17:50

## 2022-10-10 RX ADMIN — Medication 650 MILLIGRAM(S): at 05:18

## 2022-10-10 RX ADMIN — LITHIUM CARBONATE 300 MILLIGRAM(S): 300 TABLET, EXTENDED RELEASE ORAL at 05:18

## 2022-10-10 RX ADMIN — INSULIN GLARGINE 20 UNIT(S): 100 INJECTION, SOLUTION SUBCUTANEOUS at 21:51

## 2022-10-10 RX ADMIN — QUETIAPINE FUMARATE 25 MILLIGRAM(S): 200 TABLET, FILM COATED ORAL at 05:18

## 2022-10-10 NOTE — PROGRESS NOTE ADULT - PROBLEM SELECTOR PLAN 1
New in comparison to EKG from '09, psych requesting further w/u   troponin 22/20  Pt asymptomatic - chest pain free, no evidence of CHF        TTE pending  monitor on tele, however patient is refusing   will keep K>4 and Mg>2

## 2022-10-10 NOTE — BH CONSULTATION LIAISON PROGRESS NOTE - NSBHFUPINTERVALHXFT_PSY_A_CORE
Chart reviewed. Received PRNs overnight. On exam, is w/ 1:1, sheets over head, sleeping. Did not sleep in last few nights per 1:1; did eat breakfast and fell asleep this AM. When calling his name he awakens but is irritable, picking at IV, cursing. Nam not wish to be interviewed, and falls back asleep. Exam thus limited.

## 2022-10-10 NOTE — PROGRESS NOTE ADULT - PROBLEM SELECTOR PLAN 4
Home regimen: Lantus 25U qHS - decreased home dose of insulin glargine by 20% on admission     Continue Lantus 20u at bedtime  Continue ISS for now

## 2022-10-10 NOTE — BH CONSULTATION LIAISON PROGRESS NOTE - NSBHASSESSMENTFT_PSY_ALL_CORE
66yo single, disabled, male currently residing at [facility]. PPHx Bipolar 1 Disorder, multiple inpatient admissions- last as few years ago at [facility], multiple past suicide attempts, + history of aggressive behavior in the context of non compliance with medication. No legal issues or substance use issues. PMHx DM2 (insulin dependent), constipation, generalized muscle weakness, BPH. BIBA referred by SNF for agitation.    Patient presents to the ED in the context of agitation. Patient has been non compliant with medications, refusing ADLs and not caring for self. He is agitated in ED, psychotic and disorganized. He is acutely decompensated from baseline with poor insight and unpredictable behavior. He requires inpatient admission for safety and stabilization.    10/8: patient seen for follow up today, hyperverbal, illogical, disorganized, verbally dismissive and aggressive towards CL team, refusing to speak with providers, patient states "psychiatry is no good," staff report no violence or aggressive at present, 1:1 constant observation maintained.  10/9: pt seen for f/u, 1:1 at bedside. per staff, pt did not sleep at all overnight. pt uncooperative with interview, states he does not need psychiatry because he is not crazy. required prn Zyprexa 5mg IM x1 overnight. Serum lithium level 0.5 on arrival. Pending medical work up of new RBBB on EKG compared to prior; ordered for echo.  10/10: sleeping after overnight PRNs/not sleeping. Irritable when engaged, poor insight    PLAN:  -c/w 1:1 constant observation-elopement risk, disorganization  -c/w Lithium 300mg tid-monitor bun/creat, sodium level, *AVOID NSAIDS  - Please check Li level again  -c/w Melatonin 5mg hs  -c/w Seroquel to 100mg hs-hold if qtc >500  -c/w Anxiety/Mild Agitation PRN: Seroquel 25mg PO q6h prn  -c/w Severe agitation PRN: Zyprexa 5mg IM Q 6PRN  -c/w Severe Agitation: Haldol/Benadryl PRN as you are  -Monitor EKG qtc interval, HOLD antipsychotic if qtc >500  -CL to follow; possible/likely dispo to inpatient psych when medically cleared  -Patient CANNOT leave AMA

## 2022-10-10 NOTE — PROGRESS NOTE ADULT - SUBJECTIVE AND OBJECTIVE BOX
[facility] Division of Hospital Medicine  Tori Yarbrough MD  Pager: [phone]      Patient is a 67y old  Male who presents with a chief complaint of agitation (09 Oct 2022 08:55)      SUBJECTIVE / OVERNIGHT EVENTS: patient seen and examined. Per 1:1 at bedside, patient refusing tele. Patient denies chest pain or SOB. Denies palpitations. States he does not have a psychiatric problem. States "no further questions" and "go [employer]".     MEDICATIONS  (STANDING):  aspirin enteric coated 81 milliGRAM(s) Oral daily  dextrose 5%. 1000 milliLiter(s) (50 mL/Hr) IV Continuous <Continuous>  dextrose 5%. 1000 milliLiter(s) (100 mL/Hr) IV Continuous <Continuous>  dextrose 50% Injectable 25 Gram(s) IV Push once  dextrose 50% Injectable 12.5 Gram(s) IV Push once  dextrose 50% Injectable 25 Gram(s) IV Push once  enoxaparin Injectable 40 milliGRAM(s) SubCutaneous every 24 hours  glucagon  Injectable 1 milliGRAM(s) IntraMuscular once  insulin glargine Injectable (LANTUS) 20 Unit(s) SubCutaneous at bedtime  insulin lispro (ADMELOG) corrective regimen sliding scale   SubCutaneous three times a day before meals  insulin lispro (ADMELOG) corrective regimen sliding scale   SubCutaneous at bedtime  lithium 300 milliGRAM(s) Oral three times a day  melatonin 3 milliGRAM(s) Oral at bedtime  polyethylene glycol 3350 17 Gram(s) Oral daily  QUEtiapine 100 milliGRAM(s) Oral at bedtime  sodium bicarbonate 650 milliGRAM(s) Oral two times a day  tamsulosin 0.4 milliGRAM(s) Oral at bedtime    MEDICATIONS  (PRN):  bisacodyl 5 milliGRAM(s) Oral every 12 hours PRN Constipation  dextrose Oral Gel 15 Gram(s) Oral once PRN Blood Glucose LESS THAN 70 milliGRAM(s)/deciliter  OLANZapine Injectable 5 milliGRAM(s) IntraMuscular every 6 hours PRN Severe agitation  QUEtiapine 25 milliGRAM(s) Oral every 6 hours PRN Anxiety/agitation      CAPILLARY BLOOD GLUCOSE      POCT Blood Glucose.: 176 mg/dL (09 Oct 2022 23:42)    I&O's Summary      PHYSICAL EXAM:  Vital Signs Last 24 Hrs  T(C): 36.9 (10 Oct 2022 04:00), Max: 36.9 (10 Oct 2022 04:00)  T(F): 98.4 (10 Oct 2022 04:00), Max: 98.4 (10 Oct 2022 04:00)  HR: 81 (10 Oct 2022 04:00) (81 - 85)  BP: 139/89 (10 Oct 2022 04:00) (139/89 - 145/91)  BP(mean): --  RR: 17 (10 Oct 2022 04:00) (17 - 19)  SpO2: 99% (10 Oct 2022 04:00) (99% - 100%)    Parameters below as of 10 Oct 2022 04:00  Patient On (Oxygen Delivery Method): room air        CONSTITUTIONAL: NAD, well-developed, well-groomed  EYES: PERRLA; conjunctiva and sclera clear  ENMT: Moist oral mucosa  RESPIRATORY: Normal respiratory effort; lungs are clear to auscultation bilaterally  CARDIOVASCULAR:  S1/S2; No lower extremity edema  ABDOMEN: Nontender to palpation, normoactive bowel sounds, no rebound/guarding  MUSCULOSKELETAL:  no clubbing or cyanosis of digits; no joint swelling or tenderness to palpation  PSYCH: tangential and illogical; easily agitated   NEUROLOGY: Moving all ext   SKIN: No rashes; no palpable lesions    LABS:  No new    RADIOLOGY & ADDITIONAL TESTS:  Results Reviewed: X  Imaging Personally Reviewed: X  Electrocardiogram Personally Reviewed:X    COORDINATION OF CARE:  Care Discussed with Consultants/Other Providers [Y/N]:  Prior or Outpatient Records Reviewed [Y/N]:

## 2022-10-10 NOTE — PROGRESS NOTE ADULT - ASSESSMENT
67M male with DM2, bipolar 1 disorder/MDD, BPH, presenting from [facility] w/agitation, intermittently refusing medication, found to incidentally have RBBB.

## 2022-10-10 NOTE — PROGRESS NOTE ADULT - PROBLEM SELECTOR PLAN 2
Acute, suspect due to medical non-compliance   Management as per psych  c/w 1:1 enhanced supervision    Psych recommends the following:   Continue Melatonin 5mg at bedtime and Seroquel to 100mg hs-hold if qtc >500  For Anxiety/Mild Agitation: Seroquel 25mg q6h prn  For severe Agitation: Zyprexa 5mg IM q6h prn-DO NOT GIVE Ativan IV/IM within 1 hour of Zyprexa IM due to risk of sedation and/or respiratory depression  Monitor EKG qtc interval, HOLD antipsychotic if qtc >500

## 2022-10-11 LAB
ANION GAP SERPL CALC-SCNC: 12 MMOL/L — SIGNIFICANT CHANGE UP (ref 7–14)
BUN SERPL-MCNC: 15 MG/DL — SIGNIFICANT CHANGE UP (ref 7–23)
CALCIUM SERPL-MCNC: 9.1 MG/DL — SIGNIFICANT CHANGE UP (ref 8.4–10.5)
CHLORIDE SERPL-SCNC: 107 MMOL/L — SIGNIFICANT CHANGE UP (ref 98–107)
CO2 SERPL-SCNC: 20 MMOL/L — LOW (ref 22–31)
CREAT SERPL-MCNC: 0.75 MG/DL — SIGNIFICANT CHANGE UP (ref 0.5–1.3)
EGFR: 100 ML/MIN/1.73M2 — SIGNIFICANT CHANGE UP
GLUCOSE BLDC GLUCOMTR-MCNC: 133 MG/DL — HIGH (ref 70–99)
GLUCOSE BLDC GLUCOMTR-MCNC: 166 MG/DL — HIGH (ref 70–99)
GLUCOSE BLDC GLUCOMTR-MCNC: 213 MG/DL — HIGH (ref 70–99)
GLUCOSE BLDC GLUCOMTR-MCNC: 237 MG/DL — HIGH (ref 70–99)
GLUCOSE SERPL-MCNC: 146 MG/DL — HIGH (ref 70–99)
HCT VFR BLD CALC: 41.1 % — SIGNIFICANT CHANGE UP (ref 39–50)
HGB BLD-MCNC: 13.3 G/DL — SIGNIFICANT CHANGE UP (ref 13–17)
LITHIUM SERPL-MCNC: 0.6 MMOL/L — SIGNIFICANT CHANGE UP (ref 0.6–1.2)
MAGNESIUM SERPL-MCNC: 2.3 MG/DL — SIGNIFICANT CHANGE UP (ref 1.6–2.6)
MCHC RBC-ENTMCNC: 28.7 PG — SIGNIFICANT CHANGE UP (ref 27–34)
MCHC RBC-ENTMCNC: 32.4 GM/DL — SIGNIFICANT CHANGE UP (ref 32–36)
MCV RBC AUTO: 88.6 FL — SIGNIFICANT CHANGE UP (ref 80–100)
NRBC # BLD: 0 /100 WBCS — SIGNIFICANT CHANGE UP (ref 0–0)
NRBC # FLD: 0 K/UL — SIGNIFICANT CHANGE UP (ref 0–0)
PHOSPHATE SERPL-MCNC: 2.7 MG/DL — SIGNIFICANT CHANGE UP (ref 2.5–4.5)
PLATELET # BLD AUTO: 332 K/UL — SIGNIFICANT CHANGE UP (ref 150–400)
POTASSIUM SERPL-MCNC: 3.7 MMOL/L — SIGNIFICANT CHANGE UP (ref 3.5–5.3)
POTASSIUM SERPL-SCNC: 3.7 MMOL/L — SIGNIFICANT CHANGE UP (ref 3.5–5.3)
RBC # BLD: 4.64 M/UL — SIGNIFICANT CHANGE UP (ref 4.2–5.8)
RBC # FLD: 14.2 % — SIGNIFICANT CHANGE UP (ref 10.3–14.5)
SARS-COV-2 RNA SPEC QL NAA+PROBE: DETECTED
SODIUM SERPL-SCNC: 139 MMOL/L — SIGNIFICANT CHANGE UP (ref 135–145)
WBC # BLD: 11.93 K/UL — HIGH (ref 3.8–10.5)
WBC # FLD AUTO: 11.93 K/UL — HIGH (ref 3.8–10.5)

## 2022-10-11 PROCEDURE — 99233 SBSQ HOSP IP/OBS HIGH 50: CPT

## 2022-10-11 PROCEDURE — 93306 TTE W/DOPPLER COMPLETE: CPT | Mod: 26

## 2022-10-11 RX ADMIN — LITHIUM CARBONATE 300 MILLIGRAM(S): 300 TABLET, EXTENDED RELEASE ORAL at 06:29

## 2022-10-11 RX ADMIN — Medication 650 MILLIGRAM(S): at 17:37

## 2022-10-11 RX ADMIN — TAMSULOSIN HYDROCHLORIDE 0.4 MILLIGRAM(S): 0.4 CAPSULE ORAL at 21:46

## 2022-10-11 RX ADMIN — Medication 2: at 12:45

## 2022-10-11 RX ADMIN — Medication 3 MILLIGRAM(S): at 21:46

## 2022-10-11 RX ADMIN — Medication 81 MILLIGRAM(S): at 12:45

## 2022-10-11 RX ADMIN — Medication 650 MILLIGRAM(S): at 06:29

## 2022-10-11 RX ADMIN — ENOXAPARIN SODIUM 40 MILLIGRAM(S): 100 INJECTION SUBCUTANEOUS at 17:36

## 2022-10-11 RX ADMIN — LITHIUM CARBONATE 300 MILLIGRAM(S): 300 TABLET, EXTENDED RELEASE ORAL at 13:00

## 2022-10-11 RX ADMIN — INSULIN GLARGINE 20 UNIT(S): 100 INJECTION, SOLUTION SUBCUTANEOUS at 21:47

## 2022-10-11 RX ADMIN — Medication 1: at 17:36

## 2022-10-11 RX ADMIN — LITHIUM CARBONATE 300 MILLIGRAM(S): 300 TABLET, EXTENDED RELEASE ORAL at 21:46

## 2022-10-11 RX ADMIN — QUETIAPINE FUMARATE 100 MILLIGRAM(S): 200 TABLET, FILM COATED ORAL at 21:46

## 2022-10-11 NOTE — BH CONSULTATION LIAISON PROGRESS NOTE - MSE UNSTRUCTURED FT
Unable to assess MMSE d/t patient's refusal to engage in interview.
On exam, is w/ 1:1, sheets over head, sleeping. Did not sleep in last few nights per 1:1; did eat breakfast and fell asleep this AM. When calling his name he awakens but is irritable, picking at IV, cursing. Nam not wish to be interviewed, and falls back asleep. Exam thus limited.
On exam, he is AA o x 3 but manic, disinhibited, labile, with word salad/though disorganization. No safety concerns/paranoia/SI/HI but very disorganized and unable to care for self in this state.

## 2022-10-11 NOTE — PROVIDER CONTACT NOTE (OTHER) - RECOMMENDATIONS
continue to reinforce education. continue to monitor
Provider made aware, will re-attempt at a later time.

## 2022-10-11 NOTE — CHART NOTE - NSCHARTNOTEFT_GEN_A_CORE
COVID came back positive. Patient is very disorganized therefore unable to get any reliable information from him. Infection control aware and who will discuss the results with the lab to rule out false positive. Patient placed on COVID isolation precautions for now. Roommate aware of possible exposure. COVID came back positive. Patient is very disorganized therefore unable to get any reliable information from him. Infection control aware and who will discuss the results with the lab to rule out false positive. Patient placed on COVID isolation precautions for now. Roommate aware of possible exposure.    Addendum: COVID is positive, not a false positive. Attending will make family aware. COVID came back positive. Patient is very disorganized therefore unable to get any reliable information from him. Infection control aware and who will discuss the results with the lab to rule out false positive. Patient placed on COVID isolation precautions for now. Roommate aware of possible exposure.    Addendum: COVID is positive, not a false positive. Brother made aware by attending.

## 2022-10-11 NOTE — PROGRESS NOTE ADULT - PROBLEM SELECTOR PLAN 4
Home regimen: Lantus 25U qHS - decreased home dose of insulin glargine by 20% on admission     Continue Lantus 20u at bedtime + ISS, monitor FS and adjust as needed   FS acceptable at this time

## 2022-10-11 NOTE — PROGRESS NOTE ADULT - ASSESSMENT
67M male with DM2, bipolar 1 disorder/MDD, BPH, presenting from [facility] w/agitation, intermittently refusing medication, found to incidentally have RBBB.

## 2022-10-11 NOTE — PROGRESS NOTE ADULT - PROBLEM SELECTOR PLAN 1
New in comparison to EKG from '09, psych requesting further w/u   troponin 22/20  Pt asymptomatic - chest pain free, no evidence of CHF        TTE reviewed and within normal limits   monitor on tele, however patient is refusing   No further inpatient work-up needed at this time, can follow-up with outpatient cards

## 2022-10-11 NOTE — PROVIDER CONTACT NOTE (OTHER) - REASON
refusing meds, PM care
patient refusal
Patient refusing tele monitoring
Refusing FS and telemetry monitoring

## 2022-10-11 NOTE — PROVIDER CONTACT NOTE (OTHER) - ACTION/TREATMENT ORDERED:
ACP aware of patient's refusal
no action ordered at this time
ACP made aware. No further interventions at time.
No new orders made

## 2022-10-11 NOTE — PROVIDER CONTACT NOTE (OTHER) - BACKGROUND
Presented with agitation and refusal of care and non-adherance to medicaiton
67 year old male with PMH type 2 DM, bipolar disorder, BPH, presented with agitation.
pt admitted from [facility] for agitation and refusing care
patient hx of bipolar disorder, from [facility], admitted for refusing care

## 2022-10-11 NOTE — PROGRESS NOTE ADULT - SUBJECTIVE AND OBJECTIVE BOX
[facility] Division of Hospital Medicine  Tori Yarbrough MD  Pager: [phone]      Patient is a 67y old  Male who presents with a chief complaint of agitation (10 Oct 2022 10:49)      SUBJECTIVE / OVERNIGHT EVENTS: patient seen and examined. States he does not want any more blood work done, states that Seroquel and Lithium are in his "brain". Per PCA at bedside, he ate his breakfast this AM. Patient denies chest pain and SOB.     MEDICATIONS  (STANDING):  aspirin enteric coated 81 milliGRAM(s) Oral daily  dextrose 5%. 1000 milliLiter(s) (50 mL/Hr) IV Continuous <Continuous>  dextrose 5%. 1000 milliLiter(s) (100 mL/Hr) IV Continuous <Continuous>  dextrose 50% Injectable 25 Gram(s) IV Push once  dextrose 50% Injectable 12.5 Gram(s) IV Push once  dextrose 50% Injectable 25 Gram(s) IV Push once  enoxaparin Injectable 40 milliGRAM(s) SubCutaneous every 24 hours  glucagon  Injectable 1 milliGRAM(s) IntraMuscular once  insulin glargine Injectable (LANTUS) 20 Unit(s) SubCutaneous at bedtime  insulin lispro (ADMELOG) corrective regimen sliding scale   SubCutaneous three times a day before meals  insulin lispro (ADMELOG) corrective regimen sliding scale   SubCutaneous at bedtime  lithium 300 milliGRAM(s) Oral three times a day  melatonin 3 milliGRAM(s) Oral at bedtime  polyethylene glycol 3350 17 Gram(s) Oral daily  QUEtiapine 100 milliGRAM(s) Oral at bedtime  sodium bicarbonate 650 milliGRAM(s) Oral two times a day  tamsulosin 0.4 milliGRAM(s) Oral at bedtime    MEDICATIONS  (PRN):  bisacodyl 5 milliGRAM(s) Oral every 12 hours PRN Constipation  dextrose Oral Gel 15 Gram(s) Oral once PRN Blood Glucose LESS THAN 70 milliGRAM(s)/deciliter  OLANZapine Injectable 5 milliGRAM(s) IntraMuscular every 6 hours PRN Severe agitation  QUEtiapine 25 milliGRAM(s) Oral every 6 hours PRN Anxiety/agitation      CAPILLARY BLOOD GLUCOSE      POCT Blood Glucose.: 237 mg/dL (11 Oct 2022 12:28)  POCT Blood Glucose.: 133 mg/dL (11 Oct 2022 08:36)  POCT Blood Glucose.: 177 mg/dL (10 Oct 2022 21:26)  POCT Blood Glucose.: 205 mg/dL (10 Oct 2022 17:54)    I&O's Summary      PHYSICAL EXAM:  Vital Signs Last 24 Hrs  T(C): 36.6 (11 Oct 2022 12:02), Max: 37.1 (10 Oct 2022 21:19)  T(F): 97.9 (11 Oct 2022 12:02), Max: 98.7 (10 Oct 2022 21:19)  HR: 77 (11 Oct 2022 12:02) (66 - 86)  BP: 137/69 (11 Oct 2022 12:02) (110/87 - 137/69)  BP(mean): --  RR: 18 (11 Oct 2022 12:02) (17 - 18)  SpO2: 100% (11 Oct 2022 12:02) (99% - 100%)    Parameters below as of 11 Oct 2022 12:02  Patient On (Oxygen Delivery Method): room air    CONSTITUTIONAL: NAD, well-developed, well-groomed  EYES: PERRLA; conjunctiva and sclera clear  ENMT: Moist oral mucosa  RESPIRATORY: Normal respiratory effort; lungs are clear to auscultation bilaterally  CARDIOVASCULAR:  S1/S2; No lower extremity edema  ABDOMEN: Nontender to palpation, normoactive bowel sounds, no rebound/guarding  MUSCULOSKELETAL:  no clubbing or cyanosis of digits; no joint swelling or tenderness to palpation  PSYCH: tangential and illogical; easily agitated   NEUROLOGY: Moving all ext   SKIN: No rashes; no palpable lesions    LABS:                        13.3   11.93 )-----------( 332      ( 11 Oct 2022 06:00 )             41.1     10-11    139  |  107  |  15  ----------------------------<  146<H>  3.7   |  20<L>  |  0.75    Ca    9.1      11 Oct 2022 06:00  Phos  2.7     10-11  Mg     2.30     10-11        RADIOLOGY & ADDITIONAL TESTS:  Results Reviewed: X      COORDINATION OF CARE:  Care Discussed with Consultants/Other Providers [Y/N]: Discussed with psych Dr. Luna - [facility] consent needs to be signed, patient appropriate for inpatient psych

## 2022-10-11 NOTE — PROVIDER CONTACT NOTE (OTHER) - ASSESSMENT
Patient is AOx1, refusing tele today. patient educated on the importance of tele monitoring.
Patient received A&O x1, refusing FS and telemetry monitoring. No s/s of acute distress noted.
patient refused morning labs, and finger stick. patient AOx1, rude towards staff
Pt oriented to self. Refusing PM meds and to have blood sugar taken. Pt educated multiple times on the importance of medication/treatment compliance. Pt still refusing.

## 2022-10-11 NOTE — BH CONSULTATION LIAISON PROGRESS NOTE - NSBHFUPINTERVALHXFT_PSY_A_CORE
Chart reviewed. No PRNs. Li 0.6. Medically cleared. On exam, he is AA o x 3 but manic, disinhibited, labile, with word salad/though disorganization. No safety concerns/paranoia/SI/HI but very disorganized and unable to care for self in this state.

## 2022-10-11 NOTE — BH CONSULTATION LIAISON PROGRESS NOTE - NSBHASSESSMENTFT_PSY_ALL_CORE
68yo single, disabled, male currently residing at [facility]. PPHx Bipolar 1 Disorder, multiple inpatient admissions- last as few years ago at [facility], multiple past suicide attempts, + history of aggressive behavior in the context of non compliance with medication. No legal issues or substance use issues. PMHx DM2 (insulin dependent), constipation, generalized muscle weakness, BPH. BIBA referred by SNF for agitation.    Patient presents to the ED in the context of agitation. Patient has been non compliant with medications, refusing ADLs and not caring for self. He is agitated in ED, psychotic and disorganized. He is acutely decompensated from baseline with poor insight and unpredictable behavior. He requires inpatient admission for safety and stabilization.    10/8: patient seen for follow up today, hyperverbal, illogical, disorganized, verbally dismissive and aggressive towards CL team, refusing to speak with providers, patient states "psychiatry is no good," staff report no violence or aggressive at present, 1:1 constant observation maintained.  10/9: pt seen for f/u, 1:1 at bedside. per staff, pt did not sleep at all overnight. pt uncooperative with interview, states he does not need psychiatry because he is not crazy. required prn Zyprexa 5mg IM x1 overnight. Serum lithium level 0.5 on arrival. Pending medical work up of new RBBB on EKG compared to prior; ordered for echo.  10/10: sleeping after overnight PRNs/not sleeping. Irritable when engaged, poor insight  10/11: labile, disorganized, impulsive, manic    PLAN:  -c/w 1:1 constant observation-elopement risk, disorganization  -c/w Lithium 300mg tid-monitor bun/creat, sodium level, *AVOID NSAIDS  -c/w Melatonin 5mg hs  -c/w Seroquel to 100mg hs-hold if qtc >500  -c/w Anxiety/Mild Agitation PRN: Seroquel 25mg PO q6h prn  -c/w Severe agitation PRN: Zyprexa 5mg IM Q 6PRN  -c/w Severe Agitation: Haldol/Benadryl PRN as you are  -Monitor EKG qtc interval, HOLD antipsychotic if qtc >500  -CL to follow; dispo to inpatient psych when bed opens. 2PC in chart  -Patient CANNOT leave AMA

## 2022-10-11 NOTE — PROVIDER CONTACT NOTE (OTHER) - SITUATION
Patient received from ER, A&O x1. Refusing FS and telemetry monitoring despite patient education and multiple attempts.
refusing meds, PM care
Patient refusing tele monitoring
patient refuses morning labs and finger stick

## 2022-10-12 DIAGNOSIS — U07.1 COVID-19: ICD-10-CM

## 2022-10-12 LAB
ALBUMIN SERPL ELPH-MCNC: 3.9 G/DL — SIGNIFICANT CHANGE UP (ref 3.3–5)
ALP SERPL-CCNC: 95 U/L — SIGNIFICANT CHANGE UP (ref 40–120)
ALT FLD-CCNC: 16 U/L — SIGNIFICANT CHANGE UP (ref 4–41)
ANION GAP SERPL CALC-SCNC: 12 MMOL/L — SIGNIFICANT CHANGE UP (ref 7–14)
ANION GAP SERPL CALC-SCNC: 12 MMOL/L — SIGNIFICANT CHANGE UP (ref 7–14)
AST SERPL-CCNC: 14 U/L — SIGNIFICANT CHANGE UP (ref 4–40)
B PERT DNA SPEC QL NAA+PROBE: SIGNIFICANT CHANGE UP
B PERT+PARAPERT DNA PNL SPEC NAA+PROBE: SIGNIFICANT CHANGE UP
BILIRUB SERPL-MCNC: 0.4 MG/DL — SIGNIFICANT CHANGE UP (ref 0.2–1.2)
BORDETELLA PARAPERTUSSIS (RAPRVP): SIGNIFICANT CHANGE UP
BUN SERPL-MCNC: 15 MG/DL — SIGNIFICANT CHANGE UP (ref 7–23)
BUN SERPL-MCNC: 15 MG/DL — SIGNIFICANT CHANGE UP (ref 7–23)
C PNEUM DNA SPEC QL NAA+PROBE: SIGNIFICANT CHANGE UP
CALCIUM SERPL-MCNC: 9.3 MG/DL — SIGNIFICANT CHANGE UP (ref 8.4–10.5)
CALCIUM SERPL-MCNC: 9.3 MG/DL — SIGNIFICANT CHANGE UP (ref 8.4–10.5)
CHLORIDE SERPL-SCNC: 104 MMOL/L — SIGNIFICANT CHANGE UP (ref 98–107)
CHLORIDE SERPL-SCNC: 104 MMOL/L — SIGNIFICANT CHANGE UP (ref 98–107)
CK SERPL-CCNC: 52 U/L — SIGNIFICANT CHANGE UP (ref 30–200)
CO2 SERPL-SCNC: 21 MMOL/L — LOW (ref 22–31)
CO2 SERPL-SCNC: 21 MMOL/L — LOW (ref 22–31)
CREAT SERPL-MCNC: 0.74 MG/DL — SIGNIFICANT CHANGE UP (ref 0.5–1.3)
CREAT SERPL-MCNC: 0.74 MG/DL — SIGNIFICANT CHANGE UP (ref 0.5–1.3)
CRP SERPL-MCNC: 41.4 MG/L — HIGH
EGFR: 100 ML/MIN/1.73M2 — SIGNIFICANT CHANGE UP
EGFR: 100 ML/MIN/1.73M2 — SIGNIFICANT CHANGE UP
FERRITIN SERPL-MCNC: 109 NG/ML — SIGNIFICANT CHANGE UP (ref 30–400)
FLUAV SUBTYP SPEC NAA+PROBE: SIGNIFICANT CHANGE UP
FLUBV RNA SPEC QL NAA+PROBE: SIGNIFICANT CHANGE UP
GLUCOSE BLDC GLUCOMTR-MCNC: 125 MG/DL — HIGH (ref 70–99)
GLUCOSE BLDC GLUCOMTR-MCNC: 126 MG/DL — HIGH (ref 70–99)
GLUCOSE BLDC GLUCOMTR-MCNC: 146 MG/DL — HIGH (ref 70–99)
GLUCOSE BLDC GLUCOMTR-MCNC: 191 MG/DL — HIGH (ref 70–99)
GLUCOSE SERPL-MCNC: 159 MG/DL — HIGH (ref 70–99)
GLUCOSE SERPL-MCNC: 159 MG/DL — HIGH (ref 70–99)
HADV DNA SPEC QL NAA+PROBE: SIGNIFICANT CHANGE UP
HCOV 229E RNA SPEC QL NAA+PROBE: SIGNIFICANT CHANGE UP
HCOV HKU1 RNA SPEC QL NAA+PROBE: SIGNIFICANT CHANGE UP
HCOV NL63 RNA SPEC QL NAA+PROBE: SIGNIFICANT CHANGE UP
HCOV OC43 RNA SPEC QL NAA+PROBE: SIGNIFICANT CHANGE UP
HCT VFR BLD CALC: 40.4 % — SIGNIFICANT CHANGE UP (ref 39–50)
HGB BLD-MCNC: 13.3 G/DL — SIGNIFICANT CHANGE UP (ref 13–17)
HMPV RNA SPEC QL NAA+PROBE: SIGNIFICANT CHANGE UP
HPIV1 RNA SPEC QL NAA+PROBE: SIGNIFICANT CHANGE UP
HPIV2 RNA SPEC QL NAA+PROBE: SIGNIFICANT CHANGE UP
HPIV3 RNA SPEC QL NAA+PROBE: SIGNIFICANT CHANGE UP
HPIV4 RNA SPEC QL NAA+PROBE: SIGNIFICANT CHANGE UP
LDH SERPL L TO P-CCNC: 201 U/L — SIGNIFICANT CHANGE UP (ref 135–225)
M PNEUMO DNA SPEC QL NAA+PROBE: SIGNIFICANT CHANGE UP
MAGNESIUM SERPL-MCNC: 2.1 MG/DL — SIGNIFICANT CHANGE UP (ref 1.6–2.6)
MCHC RBC-ENTMCNC: 29 PG — SIGNIFICANT CHANGE UP (ref 27–34)
MCHC RBC-ENTMCNC: 32.9 GM/DL — SIGNIFICANT CHANGE UP (ref 32–36)
MCV RBC AUTO: 88 FL — SIGNIFICANT CHANGE UP (ref 80–100)
NRBC # BLD: 0 /100 WBCS — SIGNIFICANT CHANGE UP (ref 0–0)
NRBC # FLD: 0 K/UL — SIGNIFICANT CHANGE UP (ref 0–0)
PHOSPHATE SERPL-MCNC: 2.7 MG/DL — SIGNIFICANT CHANGE UP (ref 2.5–4.5)
PLATELET # BLD AUTO: 329 K/UL — SIGNIFICANT CHANGE UP (ref 150–400)
POTASSIUM SERPL-MCNC: 3.5 MMOL/L — SIGNIFICANT CHANGE UP (ref 3.5–5.3)
POTASSIUM SERPL-MCNC: 3.5 MMOL/L — SIGNIFICANT CHANGE UP (ref 3.5–5.3)
POTASSIUM SERPL-SCNC: 3.5 MMOL/L — SIGNIFICANT CHANGE UP (ref 3.5–5.3)
POTASSIUM SERPL-SCNC: 3.5 MMOL/L — SIGNIFICANT CHANGE UP (ref 3.5–5.3)
PROCALCITONIN SERPL-MCNC: 0.06 NG/ML — SIGNIFICANT CHANGE UP (ref 0.02–0.1)
PROT SERPL-MCNC: 7.1 G/DL — SIGNIFICANT CHANGE UP (ref 6–8.3)
RAPID RVP RESULT: DETECTED
RBC # BLD: 4.59 M/UL — SIGNIFICANT CHANGE UP (ref 4.2–5.8)
RBC # FLD: 14.1 % — SIGNIFICANT CHANGE UP (ref 10.3–14.5)
RSV RNA SPEC QL NAA+PROBE: SIGNIFICANT CHANGE UP
RV+EV RNA SPEC QL NAA+PROBE: SIGNIFICANT CHANGE UP
SARS-COV-2 RNA SPEC QL NAA+PROBE: DETECTED
SODIUM SERPL-SCNC: 137 MMOL/L — SIGNIFICANT CHANGE UP (ref 135–145)
SODIUM SERPL-SCNC: 137 MMOL/L — SIGNIFICANT CHANGE UP (ref 135–145)
WBC # BLD: 11.98 K/UL — HIGH (ref 3.8–10.5)
WBC # FLD AUTO: 11.98 K/UL — HIGH (ref 3.8–10.5)

## 2022-10-12 PROCEDURE — 99233 SBSQ HOSP IP/OBS HIGH 50: CPT

## 2022-10-12 RX ORDER — TAMSULOSIN HYDROCHLORIDE 0.4 MG/1
1 CAPSULE ORAL
Qty: 0 | Refills: 0 | DISCHARGE

## 2022-10-12 RX ORDER — BUPROPION HYDROCHLORIDE 150 MG/1
1 TABLET, EXTENDED RELEASE ORAL
Qty: 0 | Refills: 0 | DISCHARGE

## 2022-10-12 RX ORDER — QUETIAPINE FUMARATE 200 MG/1
200 TABLET, FILM COATED ORAL AT BEDTIME
Refills: 0 | Status: DISCONTINUED | OUTPATIENT
Start: 2022-10-12 | End: 2022-10-13

## 2022-10-12 RX ORDER — ACETAMINOPHEN 500 MG
650 TABLET ORAL EVERY 6 HOURS
Refills: 0 | Status: DISCONTINUED | OUTPATIENT
Start: 2022-10-12 | End: 2022-10-19

## 2022-10-12 RX ADMIN — POLYETHYLENE GLYCOL 3350 17 GRAM(S): 17 POWDER, FOR SOLUTION ORAL at 12:26

## 2022-10-12 RX ADMIN — ENOXAPARIN SODIUM 40 MILLIGRAM(S): 100 INJECTION SUBCUTANEOUS at 18:16

## 2022-10-12 RX ADMIN — Medication 650 MILLIGRAM(S): at 06:56

## 2022-10-12 RX ADMIN — QUETIAPINE FUMARATE 200 MILLIGRAM(S): 200 TABLET, FILM COATED ORAL at 22:06

## 2022-10-12 RX ADMIN — INSULIN GLARGINE 20 UNIT(S): 100 INJECTION, SOLUTION SUBCUTANEOUS at 22:04

## 2022-10-12 RX ADMIN — LITHIUM CARBONATE 300 MILLIGRAM(S): 300 TABLET, EXTENDED RELEASE ORAL at 22:06

## 2022-10-12 RX ADMIN — Medication 81 MILLIGRAM(S): at 12:26

## 2022-10-12 RX ADMIN — Medication 650 MILLIGRAM(S): at 22:04

## 2022-10-12 RX ADMIN — Medication 3 MILLIGRAM(S): at 22:05

## 2022-10-12 RX ADMIN — LITHIUM CARBONATE 300 MILLIGRAM(S): 300 TABLET, EXTENDED RELEASE ORAL at 14:20

## 2022-10-12 RX ADMIN — TAMSULOSIN HYDROCHLORIDE 0.4 MILLIGRAM(S): 0.4 CAPSULE ORAL at 22:04

## 2022-10-12 RX ADMIN — LITHIUM CARBONATE 300 MILLIGRAM(S): 300 TABLET, EXTENDED RELEASE ORAL at 06:56

## 2022-10-12 NOTE — PROGRESS NOTE ADULT - ASSESSMENT
67M male with DM2, bipolar 1 disorder/MDD, BPH, presenting from [facility] w/agitation, intermittently refusing medication, found to incidentally have RBBB, now with nosocomial COVID.

## 2022-10-12 NOTE — BH CONSULTATION LIAISON PROGRESS NOTE - NSBHFUPINTERVALHXFT_PSY_A_CORE
Chart reviewed. Pt is on isolation due to being COVID positive. Pt is compliant with standing psychiatric medications of Lithium. Did not require any PRN medications.   On evaluation today, pt continues to be manic. Pt was distractible, had grandiose delusions of being in the [employer] and attending [employer] in [place], asking the team to get jobs at the airport. Pt displays flight of ideas, often not making sense, switching between talking about [place] to his [place] [employer]. Pt endorses sleep disturbances, states he doesn't sleep because he's been watching TV all night. Pt alert and oriented x2 name and location, but not to year. Denies auditory and visual hallucinations. Denies SI/HI.

## 2022-10-12 NOTE — PROGRESS NOTE ADULT - PROBLEM SELECTOR PLAN 5
Home regimen: Lantus 25U qHS   - decreased home dose of insulin glargine by 20% on admission, FS currently well controlled on Lantus 20u qHS + ISS  - Monitor FS and adjust as needed

## 2022-10-12 NOTE — PROGRESS NOTE ADULT - PROBLEM SELECTOR PLAN 1
Nosocomial COVID+  - Febrile to 100.9F   - Check CXR, pending   - Will offer Remdesivir given age and symptoms  - Symptomatic treatment Nosocomial COVID+  - Febrile to 100.9F   - Check CXR, pending   - Offered Remdesivir given age and fever - D/w patient's brother Dr. Nikolai Kevin [phone], would prefer to watch off for another day and assess symptoms.   - Symptomatic treatment

## 2022-10-12 NOTE — PROGRESS NOTE ADULT - PROBLEM SELECTOR PLAN 2
New in comparison to EKG from 2009  - Patient is asymptomatic   - Delta trop negative   - Chest pain free, no evidence of CHF    - pro-  - TTE reviewed and within normal limits   - monitor on tele, however patient is refusing   - No further inpatient work-up needed at this time, can follow-up with outpatient cards

## 2022-10-12 NOTE — BH CONSULTATION LIAISON PROGRESS NOTE - NSBHASSESSMENTFT_PSY_ALL_CORE
66yo single, disabled, male currently residing at [facility]. PPHx Bipolar 1 Disorder, multiple inpatient admissions- last as few years ago at [place], multiple past suicide attempts, + history of aggressive behavior in the context of non compliance with medication. No legal issues or substance use issues. PMHx DM2 (insulin dependent), constipation, generalized muscle weakness, BPH. BIBA referred by SNF for agitation.    Patient presents to the ED in the context of agitation. Patient has been non compliant with medications, refusing ADLs and not caring for self. He is agitated in ED, psychotic and disorganized. He is acutely decompensated from baseline with poor insight and unpredictable behavior. He requires inpatient admission for safety and stabilization.    10/8: patient seen for follow up today, hyperverbal, illogical, disorganized, verbally dismissive and aggressive towards CL team, refusing to speak with providers, patient states "psychiatry is no good," staff report no violence or aggressive at present, 1:1 constant observation maintained.  10/9: pt seen for f/u, 1:1 at bedside. per staff, pt did not sleep at all overnight. pt uncooperative with interview, states he does not need psychiatry because he is not crazy. required prn Zyprexa 5mg IM x1 overnight. Serum lithium level 0.5 on arrival. Pending medical work up of new RBBB on EKG compared to prior; ordered for echo.  10/10: sleeping after overnight PRNs/not sleeping. Irritable when engaged, poor insight  10/11: labile, disorganized, impulsive, manic  10/12: flight of ideas, disorganized, manic, euphoric     PLAN:  -c/w 1:1 constant observation-elopement risk, disorganization  -c/w Lithium 300mg tid-monitor bun/creat, sodium level, *AVOID NSAIDS  -c/w Melatonin 5mg hs  - INCREASE Seroquel to 200mg HS-hold if qtc >500  -c/w Anxiety/Mild Agitation PRN: Seroquel 25mg PO q6h prn  -c/w Severe agitation PRN: Zyprexa 5mg IM Q 6PRN  -c/w Severe Agitation: Haldol/Benadryl PRN as you are  -Monitor EKG qtc interval, HOLD antipsychotic if qtc >500  -CL to follow; dispo to inpatient psych when bed opens. 2PC in chart  -Patient CANNOT leave AMA 68yo single, disabled, male currently residing at [facility]. PPHx Bipolar 1 Disorder, multiple inpatient admissions- last as few years ago at [place], multiple past suicide attempts, + history of aggressive behavior in the context of non compliance with medication. No legal issues or substance use issues. PMHx DM2 (insulin dependent), constipation, generalized muscle weakness, BPH. BIBA referred by SNF for agitation.    Patient presents to the ED in the context of agitation. Patient has been non compliant with medications, refusing ADLs and not caring for self. He is agitated in ED, psychotic and disorganized. He is acutely decompensated from baseline with poor insight and unpredictable behavior. He requires inpatient admission for safety and stabilization.    10/8: patient seen for follow up today, hyperverbal, illogical, disorganized, verbally dismissive and aggressive towards CL team, refusing to speak with providers, patient states "psychiatry is no good," staff report no violence or aggressive at present, 1:1 constant observation maintained.  10/9: pt seen for f/u, 1:1 at bedside. per staff, pt did not sleep at all overnight. pt uncooperative with interview, states he does not need psychiatry because he is not crazy. required prn Zyprexa 5mg IM x1 overnight. Serum lithium level 0.5 on arrival. Pending medical work up of new RBBB on EKG compared to prior; ordered for echo.  10/10: sleeping after overnight PRNs/not sleeping. Irritable when engaged, poor insight  10/11: labile, disorganized, impulsive, manic  10/12: flight of ideas, disorganized, manic, euphoric     PLAN:  -c/w 1:1 constant observation-elopement risk, disorganization  -c/w Lithium 300mg tid-monitor bun/creat, sodium level, *AVOID NSAIDS  -c/w Melatonin 5mg hs  - INCREASE Seroquel to 200mg HS-hold if qtc >500  -c/w Anxiety/Mild Agitation PRN: Seroquel 25mg PO q6h prn  -c/w Severe agitation PRN: Zyprexa 5mg IM Q 6PRN  -c/w Severe Agitation: Haldol/Benadryl PRN as you are  -Monitor EKG qtc interval, HOLD antipsychotic if qtc >500  -CL to follow; dispo to inpatient psych when done with COVID isolation. 2PC in chart  -Patient CANNOT leave AMA

## 2022-10-12 NOTE — PROGRESS NOTE ADULT - PROBLEM SELECTOR PLAN 7
DVT ppx: Lovenox  Dispo: Patient requires 11 day quarantine prior to inpatient psych transfer      Discussed w/ ACP Susan DVT ppx: Lovenox  Dispo: Patient requires 11 day quarantine prior to inpatient psych transfer      Discussed w/ ACP Susan  D/w patient's brother Dr. Nikolai Kevin and updated

## 2022-10-12 NOTE — PROGRESS NOTE ADULT - PROBLEM SELECTOR PLAN 4
MDD/bipolar 1  Continue Lithium 300mg TID and Seroquel 100mg qHS   - Psych following, recs appreciated Home meds: Lithium 300mg TID, Seroquel 400mg qHS, Buproprion 100mg   - Continue Lithium 300mg TID   - D/w Dr. Luna, increase Seroquel from 100mg to 200mg qHS  - Buproprion on hold    - Psych following, recs appreciated

## 2022-10-12 NOTE — PROGRESS NOTE ADULT - SUBJECTIVE AND OBJECTIVE BOX
[facility] Division of Hospital Medicine  Tori Yarbrough MD  Pager: [phone]      Patient is a 67y old  Male who presents with a chief complaint of agitation (11 Oct 2022 12:52)      SUBJECTIVE / OVERNIGHT EVENTS: Patient states that he has "no problems", Denies cough, chest pain, SOB or fever. Per PCA at bedside, patient is coughing. Febrile to 100.7F, given acetaminophen     MEDICATIONS  (STANDING):  aspirin enteric coated 81 milliGRAM(s) Oral daily  dextrose 5%. 1000 milliLiter(s) (50 mL/Hr) IV Continuous <Continuous>  dextrose 5%. 1000 milliLiter(s) (100 mL/Hr) IV Continuous <Continuous>  dextrose 50% Injectable 25 Gram(s) IV Push once  dextrose 50% Injectable 12.5 Gram(s) IV Push once  dextrose 50% Injectable 25 Gram(s) IV Push once  enoxaparin Injectable 40 milliGRAM(s) SubCutaneous every 24 hours  glucagon  Injectable 1 milliGRAM(s) IntraMuscular once  insulin glargine Injectable (LANTUS) 20 Unit(s) SubCutaneous at bedtime  insulin lispro (ADMELOG) corrective regimen sliding scale   SubCutaneous three times a day before meals  insulin lispro (ADMELOG) corrective regimen sliding scale   SubCutaneous at bedtime  lithium 300 milliGRAM(s) Oral three times a day  melatonin 3 milliGRAM(s) Oral at bedtime  polyethylene glycol 3350 17 Gram(s) Oral daily  QUEtiapine 100 milliGRAM(s) Oral at bedtime  sodium bicarbonate 650 milliGRAM(s) Oral two times a day  tamsulosin 0.4 milliGRAM(s) Oral at bedtime    MEDICATIONS  (PRN):  acetaminophen     Tablet .. 650 milliGRAM(s) Oral every 6 hours PRN Temp greater or equal to 38C (100.4F), Mild Pain (1 - 3), Moderate Pain (4 - 6)  bisacodyl 5 milliGRAM(s) Oral every 12 hours PRN Constipation  dextrose Oral Gel 15 Gram(s) Oral once PRN Blood Glucose LESS THAN 70 milliGRAM(s)/deciliter  OLANZapine Injectable 5 milliGRAM(s) IntraMuscular every 6 hours PRN Severe agitation  QUEtiapine 25 milliGRAM(s) Oral every 6 hours PRN Anxiety/agitation      CAPILLARY BLOOD GLUCOSE      POCT Blood Glucose.: 126 mg/dL (12 Oct 2022 12:21)  POCT Blood Glucose.: 146 mg/dL (12 Oct 2022 08:19)  POCT Blood Glucose.: 213 mg/dL (11 Oct 2022 21:44)  POCT Blood Glucose.: 166 mg/dL (11 Oct 2022 17:15)    I&O's Summary      PHYSICAL EXAM:  Vital Signs Last 24 Hrs  T(C): 37.7 (12 Oct 2022 09:06), Max: 38.2 (12 Oct 2022 07:58)  T(F): 99.9 (12 Oct 2022 09:06), Max: 100.7 (12 Oct 2022 07:58)  HR: 84 (12 Oct 2022 09:06) (80 - 99)  BP: 130/74 (12 Oct 2022 07:58) (130/74 - 144/90)  BP(mean): --  RR: 17 (12 Oct 2022 09:06) (16 - 17)  SpO2: 95% (12 Oct 2022 09:06) (95% - 97%)    Parameters below as of 12 Oct 2022 09:06  Patient On (Oxygen Delivery Method): room air      CONSTITUTIONAL: NAD, well-developed, well-groomed  EYES: PERRLA; conjunctiva and sclera clear  ENMT: Moist oral mucosa  RESPIRATORY: Normal respiratory effort; lungs are clear to auscultation bilaterally  CARDIOVASCULAR:  S1/S2; No lower extremity edema  ABDOMEN: Nontender to palpation, normoactive bowel sounds, no rebound/guarding  MUSCULOSKELETAL:  no clubbing or cyanosis of digits; no joint swelling or tenderness to palpation  PSYCH: tangential and illogical  NEUROLOGY: Moving all ext   SKIN: No rashes; no palpable lesions    LABS:                        13.3   11.98 )-----------( 329      ( 12 Oct 2022 08:27 )             40.4     10-12    137  |  104  |  15  ----------------------------<  159<H>  3.5   |  21<L>  |  0.74    Ca    9.3      12 Oct 2022 08:27  Phos  2.7     10-12  Mg     2.10     10-12      RADIOLOGY & ADDITIONAL TESTS:  Results Reviewed:   Imaging Personally Reviewed:  Electrocardiogram Personally Reviewed:    COORDINATION OF CARE:  Care Discussed with Consultants/Other Providers [Y/N]:  Prior or Outpatient Records Reviewed [Y/N]:

## 2022-10-13 LAB
ANION GAP SERPL CALC-SCNC: 13 MMOL/L — SIGNIFICANT CHANGE UP (ref 7–14)
BUN SERPL-MCNC: 17 MG/DL — SIGNIFICANT CHANGE UP (ref 7–23)
CALCIUM SERPL-MCNC: 9.1 MG/DL — SIGNIFICANT CHANGE UP (ref 8.4–10.5)
CHLORIDE SERPL-SCNC: 106 MMOL/L — SIGNIFICANT CHANGE UP (ref 98–107)
CO2 SERPL-SCNC: 20 MMOL/L — LOW (ref 22–31)
CREAT SERPL-MCNC: 0.83 MG/DL — SIGNIFICANT CHANGE UP (ref 0.5–1.3)
EGFR: 97 ML/MIN/1.73M2 — SIGNIFICANT CHANGE UP
GLUCOSE BLDC GLUCOMTR-MCNC: 146 MG/DL — HIGH (ref 70–99)
GLUCOSE BLDC GLUCOMTR-MCNC: 150 MG/DL — HIGH (ref 70–99)
GLUCOSE BLDC GLUCOMTR-MCNC: 156 MG/DL — HIGH (ref 70–99)
GLUCOSE BLDC GLUCOMTR-MCNC: 167 MG/DL — HIGH (ref 70–99)
GLUCOSE BLDC GLUCOMTR-MCNC: 174 MG/DL — HIGH (ref 70–99)
GLUCOSE SERPL-MCNC: 147 MG/DL — HIGH (ref 70–99)
HCT VFR BLD CALC: 42.5 % — SIGNIFICANT CHANGE UP (ref 39–50)
HGB BLD-MCNC: 14.2 G/DL — SIGNIFICANT CHANGE UP (ref 13–17)
MAGNESIUM SERPL-MCNC: 2.2 MG/DL — SIGNIFICANT CHANGE UP (ref 1.6–2.6)
MCHC RBC-ENTMCNC: 28.8 PG — SIGNIFICANT CHANGE UP (ref 27–34)
MCHC RBC-ENTMCNC: 33.4 GM/DL — SIGNIFICANT CHANGE UP (ref 32–36)
MCV RBC AUTO: 86.2 FL — SIGNIFICANT CHANGE UP (ref 80–100)
NRBC # BLD: 0 /100 WBCS — SIGNIFICANT CHANGE UP (ref 0–0)
NRBC # FLD: 0 K/UL — SIGNIFICANT CHANGE UP (ref 0–0)
PHOSPHATE SERPL-MCNC: 2.7 MG/DL — SIGNIFICANT CHANGE UP (ref 2.5–4.5)
PLATELET # BLD AUTO: 307 K/UL — SIGNIFICANT CHANGE UP (ref 150–400)
POTASSIUM SERPL-MCNC: 4.1 MMOL/L — SIGNIFICANT CHANGE UP (ref 3.5–5.3)
POTASSIUM SERPL-SCNC: 4.1 MMOL/L — SIGNIFICANT CHANGE UP (ref 3.5–5.3)
RBC # BLD: 4.93 M/UL — SIGNIFICANT CHANGE UP (ref 4.2–5.8)
RBC # FLD: 13.9 % — SIGNIFICANT CHANGE UP (ref 10.3–14.5)
SODIUM SERPL-SCNC: 139 MMOL/L — SIGNIFICANT CHANGE UP (ref 135–145)
WBC # BLD: 12.43 K/UL — HIGH (ref 3.8–10.5)
WBC # FLD AUTO: 12.43 K/UL — HIGH (ref 3.8–10.5)

## 2022-10-13 PROCEDURE — 99233 SBSQ HOSP IP/OBS HIGH 50: CPT

## 2022-10-13 PROCEDURE — 71045 X-RAY EXAM CHEST 1 VIEW: CPT | Mod: 26

## 2022-10-13 RX ORDER — QUETIAPINE FUMARATE 200 MG/1
300 TABLET, FILM COATED ORAL AT BEDTIME
Refills: 0 | Status: DISCONTINUED | OUTPATIENT
Start: 2022-10-13 | End: 2022-10-14

## 2022-10-13 RX ADMIN — INSULIN GLARGINE 20 UNIT(S): 100 INJECTION, SOLUTION SUBCUTANEOUS at 23:02

## 2022-10-13 RX ADMIN — QUETIAPINE FUMARATE 300 MILLIGRAM(S): 200 TABLET, FILM COATED ORAL at 23:35

## 2022-10-13 RX ADMIN — TAMSULOSIN HYDROCHLORIDE 0.4 MILLIGRAM(S): 0.4 CAPSULE ORAL at 22:59

## 2022-10-13 RX ADMIN — OLANZAPINE 5 MILLIGRAM(S): 15 TABLET, FILM COATED ORAL at 07:15

## 2022-10-13 RX ADMIN — Medication 3 MILLIGRAM(S): at 22:59

## 2022-10-13 RX ADMIN — Medication 1: at 13:14

## 2022-10-13 RX ADMIN — LITHIUM CARBONATE 300 MILLIGRAM(S): 300 TABLET, EXTENDED RELEASE ORAL at 23:35

## 2022-10-13 RX ADMIN — LITHIUM CARBONATE 300 MILLIGRAM(S): 300 TABLET, EXTENDED RELEASE ORAL at 17:41

## 2022-10-13 RX ADMIN — LITHIUM CARBONATE 300 MILLIGRAM(S): 300 TABLET, EXTENDED RELEASE ORAL at 06:02

## 2022-10-13 RX ADMIN — Medication 650 MILLIGRAM(S): at 06:02

## 2022-10-13 RX ADMIN — Medication 650 MILLIGRAM(S): at 17:39

## 2022-10-13 RX ADMIN — Medication 81 MILLIGRAM(S): at 17:39

## 2022-10-13 RX ADMIN — Medication 1: at 17:40

## 2022-10-13 NOTE — PROGRESS NOTE ADULT - PROBLEM SELECTOR PLAN 7
DVT ppx: Lovenox  Dispo: Patient requires 11 day quarantine prior to inpatient psych transfer      Discussed w/ ACP Susan  D/w patient's brother Dr. Nikolai Kevin and updated DVT ppx: Lovenox  Dispo: Patient requires 11 day quarantine for COVID prior to inpatient psych transfer      Discussed w/ ACP Tori   D/w patient's brother Dr. Nikolai Kevin and updated

## 2022-10-13 NOTE — PROGRESS NOTE ADULT - PROBLEM SELECTOR PLAN 3
Acute, suspect due to medication non-adherence, now taking Lithium and Seroquel    - Management as per psych  - c/w 1:1 enhanced supervision    PRNS:   - For Anxiety/Mild Agitation: Seroquel 25mg q6h prn  - For severe Agitation: Zyprexa 5mg IM q6h prn-DO NOT GIVE Ativan IV/IM within 1 hour of Zyprexa IM due to risk of sedation and/or respiratory depression

## 2022-10-13 NOTE — BH CONSULTATION LIAISON PROGRESS NOTE - NSBHASSESSMENTFT_PSY_ALL_CORE
68yo single, disabled, male currently residing at [facility]. PPHx Bipolar 1 Disorder, multiple inpatient admissions- last as few years ago at [place], multiple past suicide attempts, + history of aggressive behavior in the context of non compliance with medication. No legal issues or substance use issues. PMHx DM2 (insulin dependent), constipation, generalized muscle weakness, BPH. BIBA referred by SNF for agitation.    Patient presents to the ED in the context of agitation. Patient has been non compliant with medications, refusing ADLs and not caring for self. He is agitated in ED, psychotic and disorganized. He is acutely decompensated from baseline with poor insight and unpredictable behavior. He requires inpatient admission for safety and stabilization.    10/8: patient seen for follow up today, hyperverbal, illogical, disorganized, verbally dismissive and aggressive towards CL team, refusing to speak with providers, patient states "psychiatry is no good," staff report no violence or aggressive at present, 1:1 constant observation maintained.  10/9: pt seen for f/u, 1:1 at bedside. per staff, pt did not sleep at all overnight. pt uncooperative with interview, states he does not need psychiatry because he is not crazy. required prn Zyprexa 5mg IM x1 overnight. Serum lithium level 0.5 on arrival. Pending medical work up of new RBBB on EKG compared to prior; ordered for echo.  10/10: sleeping after overnight PRNs/not sleeping. Irritable when engaged, poor insight  10/11: labile, disorganized, impulsive, manic  10/12: flight of ideas, disorganized, manic, euphoric   10/13: flight of ideas, manic, remembers previous delusions and continues to talk about them     PLAN:  -c/w 1:1 constant observation-elopement risk, disorganization  -c/w Lithium 300mg tid-monitor bun/creat, sodium level, *AVOID NSAIDS  -c/w Melatonin 5mg hs  - INCREASE Seroquel to 300mg HS-hold if qtc >500  -c/w Anxiety/Mild Agitation PRN: Seroquel 25mg PO q6h prn  -c/w Severe agitation PRN: Zyprexa 5mg IM Q 6PRN  -c/w Severe Agitation: Haldol/Benadryl PRN as you are  -Monitor EKG qtc interval, HOLD antipsychotic if qtc >500  -CL to follow; dispo to inpatient psych when done with COVID isolation. 2PC in chart  -Patient CANNOT leave AMA

## 2022-10-13 NOTE — BH CONSULTATION LIAISON PROGRESS NOTE - NSBHFUPINTERVALHXFT_PSY_A_CORE
Chart reviewed. Pt is in isolation due to being COVID positive. Pt is compliant with standing psychiatric medications of lithium and Quetiapine (Seroquel). Required 1 dose of Olanzapine (Zyprexa) IM PRN 5mg at 7:15 this morning (10/13/22).  On evaluation today, pt continues to be manic. Had continued grandiose delusions of being in the [employer] and responsible for sensitive information, continues to describe his [place] passport. When team walked in, pt asked the team if we acquired jobs at the airport like he asked us to yesterday. Continues to display flight of ideas, referred to his IV line as the "[place] lighthouse". Pt endorses that he was up until 2am watching TV last night, endorses good appetite, feels safe in the hospital, and denies feeling like anyone is trying to harm him. Denies auditory and visual hallucinations. Denies SI/HI. Pt was alert and orientated x2 (person and place), but not to date (endorses that it is Sept. 22, 2024).

## 2022-10-13 NOTE — PROGRESS NOTE ADULT - PROBLEM SELECTOR PLAN 1
Nosocomial COVID+  - Afebrile x24 hours, continue to monitor fever curve   - CXR reviewed, clear lungs on my read    - Offered Remdesivir given age and fever - D/w patient's brother Dr. Nikolai Kevin [phone], would prefer to watch off for another day and assess symptoms.   - Symptomatic treatment Nosocomial COVID+  - Afebrile x24 hours, continue to monitor fever curve   - CXR reviewed, clear lungs on my read    - Offered Remdesivir given age and fever - D/w patient's brother Dr. Nikolai Kevin, would prefer to observe off for now   - Symptomatic treatment

## 2022-10-13 NOTE — PROGRESS NOTE ADULT - PROBLEM SELECTOR PLAN 4
Home meds: Lithium 300mg TID, Seroquel 400mg qHS, Buproprion 100mg   - Continue Lithium 300mg TID   - C/w Seroquel 200mg qHS  - Buproprion on hold    - Psych following, recs appreciated

## 2022-10-13 NOTE — PROGRESS NOTE ADULT - SUBJECTIVE AND OBJECTIVE BOX
[facility] Division of Hospital Medicine  Tori Yarbrough MD  Pager: [phone]      Patient is a 67y old  Male who presents with a chief complaint of agitation (12 Oct 2022 13:24)      SUBJECTIVE / OVERNIGHT EVENTS: patient seen and examined, states he has a "cold". Denies fevers, chills, chest pain or SOB. Denies cough. States he has runny nose.     MEDICATIONS  (STANDING):  aspirin enteric coated 81 milliGRAM(s) Oral daily  dextrose 5%. 1000 milliLiter(s) (50 mL/Hr) IV Continuous <Continuous>  dextrose 5%. 1000 milliLiter(s) (100 mL/Hr) IV Continuous <Continuous>  dextrose 50% Injectable 25 Gram(s) IV Push once  dextrose 50% Injectable 12.5 Gram(s) IV Push once  dextrose 50% Injectable 25 Gram(s) IV Push once  enoxaparin Injectable 40 milliGRAM(s) SubCutaneous every 24 hours  glucagon  Injectable 1 milliGRAM(s) IntraMuscular once  insulin glargine Injectable (LANTUS) 20 Unit(s) SubCutaneous at bedtime  insulin lispro (ADMELOG) corrective regimen sliding scale   SubCutaneous three times a day before meals  insulin lispro (ADMELOG) corrective regimen sliding scale   SubCutaneous at bedtime  lithium 300 milliGRAM(s) Oral three times a day  melatonin 3 milliGRAM(s) Oral at bedtime  polyethylene glycol 3350 17 Gram(s) Oral daily  QUEtiapine 200 milliGRAM(s) Oral at bedtime  sodium bicarbonate 650 milliGRAM(s) Oral two times a day  tamsulosin 0.4 milliGRAM(s) Oral at bedtime    MEDICATIONS  (PRN):  acetaminophen     Tablet .. 650 milliGRAM(s) Oral every 6 hours PRN Temp greater or equal to 38C (100.4F), Mild Pain (1 - 3), Moderate Pain (4 - 6)  bisacodyl 5 milliGRAM(s) Oral every 12 hours PRN Constipation  dextrose Oral Gel 15 Gram(s) Oral once PRN Blood Glucose LESS THAN 70 milliGRAM(s)/deciliter  OLANZapine Injectable 5 milliGRAM(s) IntraMuscular every 6 hours PRN Severe agitation  QUEtiapine 25 milliGRAM(s) Oral every 6 hours PRN Anxiety/agitation      CAPILLARY BLOOD GLUCOSE      POCT Blood Glucose.: 146 mg/dL (13 Oct 2022 08:44)  POCT Blood Glucose.: 191 mg/dL (12 Oct 2022 22:14)  POCT Blood Glucose.: 125 mg/dL (12 Oct 2022 17:21)  POCT Blood Glucose.: 126 mg/dL (12 Oct 2022 12:21)    I&O's Summary      PHYSICAL EXAM:  Vital Signs Last 24 Hrs  T(C): 37.2 (13 Oct 2022 12:16), Max: 37.2 (13 Oct 2022 12:16)  T(F): 99 (13 Oct 2022 12:16), Max: 99 (13 Oct 2022 12:16)  HR: 78 (13 Oct 2022 12:16) (78 - 84)  BP: 124/78 (13 Oct 2022 12:16) (124/78 - 143/87)  BP(mean): --  RR: 17 (13 Oct 2022 12:16) (16 - 17)  SpO2: 95% (13 Oct 2022 12:16) (95% - 97%)    Parameters below as of 13 Oct 2022 12:16  Patient On (Oxygen Delivery Method): room air    CONSTITUTIONAL: NAD, well-developed, well-groomed  EYES: Conjunctiva and sclera clear  ENMT: Moist oral mucosa  RESPIRATORY: Normal respiratory effort; lungs are clear to auscultation bilaterally  CARDIOVASCULAR:  S1/S2; No lower extremity edema  ABDOMEN: Nontender to palpation, normoactive bowel sounds, no rebound/guarding  MUSCULOSKELETAL:  no clubbing or cyanosis of digits; no joint swelling or tenderness to palpation  PSYCH: tangential and illogical; not agitated   NEUROLOGY: Moving all ext   SKIN: No rashes; no palpable lesions      LABS:                        14.2   12.43 )-----------( 307      ( 13 Oct 2022 07:15 )             42.5     10-13    139  |  106  |  17  ----------------------------<  147<H>  4.1   |  20<L>  |  0.83    Ca    9.1      13 Oct 2022 07:15  Phos  2.7     10-13  Mg     2.20     10-13    TPro  7.1  /  Alb  3.9  /  TBili  0.4  /  DBili  x   /  AST  14  /  ALT  16  /  AlkPhos  95  10-12      CARDIAC MARKERS ( 12 Oct 2022 08:27 )  x     / x     / 52 U/L / x     / x                RADIOLOGY & ADDITIONAL TESTS:  Results Reviewed: X  Imaging Personally Reviewed: JOSE FRANCISCO

## 2022-10-13 NOTE — PROGRESS NOTE ADULT - ASSESSMENT
67M male with DM2, bipolar 1 disorder/MDD, BPH, presenting from [facility] w/agitation, intermittently refusing medication, found to incidentally have RBBB, now with nosocomial COVID.

## 2022-10-14 LAB
ALBUMIN SERPL ELPH-MCNC: 3.7 G/DL — SIGNIFICANT CHANGE UP (ref 3.3–5)
ALP SERPL-CCNC: 94 U/L — SIGNIFICANT CHANGE UP (ref 40–120)
ALT FLD-CCNC: 17 U/L — SIGNIFICANT CHANGE UP (ref 4–41)
ANION GAP SERPL CALC-SCNC: 14 MMOL/L — SIGNIFICANT CHANGE UP (ref 7–14)
AST SERPL-CCNC: 13 U/L — SIGNIFICANT CHANGE UP (ref 4–40)
BILIRUB SERPL-MCNC: 0.4 MG/DL — SIGNIFICANT CHANGE UP (ref 0.2–1.2)
BUN SERPL-MCNC: 18 MG/DL — SIGNIFICANT CHANGE UP (ref 7–23)
CALCIUM SERPL-MCNC: 9.2 MG/DL — SIGNIFICANT CHANGE UP (ref 8.4–10.5)
CHLORIDE SERPL-SCNC: 104 MMOL/L — SIGNIFICANT CHANGE UP (ref 98–107)
CO2 SERPL-SCNC: 19 MMOL/L — LOW (ref 22–31)
CREAT SERPL-MCNC: 0.77 MG/DL — SIGNIFICANT CHANGE UP (ref 0.5–1.3)
EGFR: 99 ML/MIN/1.73M2 — SIGNIFICANT CHANGE UP
GLUCOSE BLDC GLUCOMTR-MCNC: 127 MG/DL — HIGH (ref 70–99)
GLUCOSE BLDC GLUCOMTR-MCNC: 133 MG/DL — HIGH (ref 70–99)
GLUCOSE BLDC GLUCOMTR-MCNC: 155 MG/DL — HIGH (ref 70–99)
GLUCOSE BLDC GLUCOMTR-MCNC: 158 MG/DL — HIGH (ref 70–99)
GLUCOSE BLDC GLUCOMTR-MCNC: 178 MG/DL — HIGH (ref 70–99)
GLUCOSE SERPL-MCNC: 177 MG/DL — HIGH (ref 70–99)
HCT VFR BLD CALC: 45.4 % — SIGNIFICANT CHANGE UP (ref 39–50)
HGB BLD-MCNC: 15.4 G/DL — SIGNIFICANT CHANGE UP (ref 13–17)
MAGNESIUM SERPL-MCNC: 2.1 MG/DL — SIGNIFICANT CHANGE UP (ref 1.6–2.6)
MCHC RBC-ENTMCNC: 29.5 PG — SIGNIFICANT CHANGE UP (ref 27–34)
MCHC RBC-ENTMCNC: 33.9 GM/DL — SIGNIFICANT CHANGE UP (ref 32–36)
MCV RBC AUTO: 87 FL — SIGNIFICANT CHANGE UP (ref 80–100)
NRBC # BLD: 0 /100 WBCS — SIGNIFICANT CHANGE UP (ref 0–0)
NRBC # FLD: 0 K/UL — SIGNIFICANT CHANGE UP (ref 0–0)
PHOSPHATE SERPL-MCNC: 2.9 MG/DL — SIGNIFICANT CHANGE UP (ref 2.5–4.5)
PLATELET # BLD AUTO: 304 K/UL — SIGNIFICANT CHANGE UP (ref 150–400)
POTASSIUM SERPL-MCNC: 4.1 MMOL/L — SIGNIFICANT CHANGE UP (ref 3.5–5.3)
POTASSIUM SERPL-SCNC: 4.1 MMOL/L — SIGNIFICANT CHANGE UP (ref 3.5–5.3)
PROT SERPL-MCNC: 7.5 G/DL — SIGNIFICANT CHANGE UP (ref 6–8.3)
RBC # BLD: 5.22 M/UL — SIGNIFICANT CHANGE UP (ref 4.2–5.8)
RBC # FLD: 14 % — SIGNIFICANT CHANGE UP (ref 10.3–14.5)
SODIUM SERPL-SCNC: 137 MMOL/L — SIGNIFICANT CHANGE UP (ref 135–145)
WBC # BLD: 13.89 K/UL — HIGH (ref 3.8–10.5)
WBC # FLD AUTO: 13.89 K/UL — HIGH (ref 3.8–10.5)

## 2022-10-14 PROCEDURE — 99233 SBSQ HOSP IP/OBS HIGH 50: CPT

## 2022-10-14 PROCEDURE — 99232 SBSQ HOSP IP/OBS MODERATE 35: CPT

## 2022-10-14 RX ORDER — QUETIAPINE FUMARATE 200 MG/1
400 TABLET, FILM COATED ORAL AT BEDTIME
Refills: 0 | Status: DISCONTINUED | OUTPATIENT
Start: 2022-10-14 | End: 2022-10-18

## 2022-10-14 RX ADMIN — Medication 1: at 08:58

## 2022-10-14 RX ADMIN — TAMSULOSIN HYDROCHLORIDE 0.4 MILLIGRAM(S): 0.4 CAPSULE ORAL at 22:43

## 2022-10-14 RX ADMIN — Medication 81 MILLIGRAM(S): at 11:53

## 2022-10-14 RX ADMIN — Medication 650 MILLIGRAM(S): at 19:31

## 2022-10-14 RX ADMIN — Medication 1: at 13:20

## 2022-10-14 RX ADMIN — Medication 3 MILLIGRAM(S): at 22:44

## 2022-10-14 RX ADMIN — LITHIUM CARBONATE 300 MILLIGRAM(S): 300 TABLET, EXTENDED RELEASE ORAL at 22:54

## 2022-10-14 RX ADMIN — Medication 650 MILLIGRAM(S): at 06:48

## 2022-10-14 RX ADMIN — INSULIN GLARGINE 20 UNIT(S): 100 INJECTION, SOLUTION SUBCUTANEOUS at 23:30

## 2022-10-14 RX ADMIN — QUETIAPINE FUMARATE 400 MILLIGRAM(S): 200 TABLET, FILM COATED ORAL at 22:54

## 2022-10-14 RX ADMIN — LITHIUM CARBONATE 300 MILLIGRAM(S): 300 TABLET, EXTENDED RELEASE ORAL at 15:03

## 2022-10-14 RX ADMIN — LITHIUM CARBONATE 300 MILLIGRAM(S): 300 TABLET, EXTENDED RELEASE ORAL at 06:49

## 2022-10-14 RX ADMIN — OLANZAPINE 5 MILLIGRAM(S): 15 TABLET, FILM COATED ORAL at 05:47

## 2022-10-14 NOTE — BH CONSULTATION LIAISON PROGRESS NOTE - NSICDXBHSECONDARYDX_PSY_ALL_CORE
Bipolar 1 disorder   F31.9  Type 2 diabetes mellitus, with long-term current use of insulin   E11.9  Need for prophylactic measure   Z29.9  RBBB (right bundle branch block)   I45.10  Agitation   R45.1  BPH (benign prostatic hyperplasia)   N40.0  COVID-19 virus infection   U07.1

## 2022-10-14 NOTE — BH CONSULTATION LIAISON PROGRESS NOTE - NSBHFUPINTERVALHXFT_PSY_A_CORE
Chart reviewed. Pt is in isolation due to being COVID positive. Pt took his standing psychiatric medications of lithium and Seroquel overnight. Pt had to receive 1 dose of Olanzapine (Zyprexa) 5mg IM PRN at 5:47 a.m. this morning (10/14/22)   On exam, pt continues to be manic. Had continued grandiose delusions of being in the [employer] and being in charge of investigations. 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 Pt appears better than yesterday, was more interruptible today amongst his grandiose thoughts. Endorses feeling safe in the hospital, denies anyone trying to harm him. Endorses sleeping well last night, states he slept for 6 hours. Denies auditory and visual hallucinations. Alert and oriented x2 (states it's October 2024).

## 2022-10-14 NOTE — PHYSICAL THERAPY INITIAL EVALUATION ADULT - ADDITIONAL COMMENTS
Pt is a questionable historian.  Pt reports he came from a rehab center.  Pt reports he does not use DME to ambulate and completes his ADL's independently.  Pt reporting no history of falls.

## 2022-10-14 NOTE — BH CONSULTATION LIAISON PROGRESS NOTE - NSBHINDICATION_PSY_ALL_CORE
elopement risk, disorganization
impulsivity/elopement 
impulsivity
elopement risk, disorganization
impulsivity

## 2022-10-14 NOTE — BH CONSULTATION LIAISON PROGRESS NOTE - NSBHASSESSMENTFT_PSY_ALL_CORE
66yo single, disabled, male currently residing at [facility]. PPHx Bipolar 1 Disorder, multiple inpatient admissions- last as few years ago at [place], multiple past suicide attempts, + history of aggressive behavior in the context of non compliance with medication. No legal issues or substance use issues. PMHx DM2 (insulin dependent), constipation, generalized muscle weakness, BPH. BIBA referred by SNF for agitation.    Patient presents to the ED in the context of agitation. Patient has been non compliant with medications, refusing ADLs and not caring for self. He is agitated in ED, psychotic and disorganized. He is acutely decompensated from baseline with poor insight and unpredictable behavior. He requires inpatient admission for safety and stabilization.    10/8: patient seen for follow up today, hyperverbal, illogical, disorganized, verbally dismissive and aggressive towards CL team, refusing to speak with providers, patient states "psychiatry is no good," staff report no violence or aggressive at present, 1:1 constant observation maintained.  10/9: pt seen for f/u, 1:1 at bedside. per staff, pt did not sleep at all overnight. pt uncooperative with interview, states he does not need psychiatry because he is not crazy. required prn Zyprexa 5mg IM x1 overnight. Serum lithium level 0.5 on arrival. Pending medical work up of new RBBB on EKG compared to prior; ordered for echo.  10/10: sleeping after overnight PRNs/not sleeping. Irritable when engaged, poor insight  10/11: labile, disorganized, impulsive, manic  10/12: flight of ideas, disorganized, manic, euphoric   10/13: flight of ideas, manic, remembers previous delusions and continues to talk about them   10/14: manic, remembers previous delusions, grandiose delusions, AAOx2 (disoriented to time), flight of ideas still present but improving     PLAN:  -c/w 1:1 constant observation-elopement risk, disorganization  -c/w Lithium 300mg tid-monitor bun/creat, sodium level, *AVOID NSAIDS  -c/w Melatonin 5mg hs  - INCREASE Seroquel to 400mg HS-hold if qtc >500  -c/w Anxiety/Mild Agitation PRN: Seroquel 25mg PO q6h prn  -c/w Severe agitation PRN: Zyprexa 5mg IM Q 6PRN  -c/w Severe Agitation: Haldol/Benadryl PRN as you are  -Monitor EKG qtc interval, HOLD antipsychotic if qtc >500  -CL to follow; dispo to inpatient psych when done with COVID isolation. 2PC in chart  -Patient CANNOT leave AMA 66yo single, disabled, male currently residing at [facility]. PPHx Bipolar 1 Disorder, multiple inpatient admissions- last as few years ago at [facility], multiple past suicide attempts, + history of aggressive behavior in the context of non compliance with medication. No legal issues or substance use issues. PMHx DM2 (insulin dependent), constipation, generalized muscle weakness, BPH. BIBA referred by SNF for agitation.    Patient presents to the ED in the context of agitation. Patient has been non compliant with medications, refusing ADLs and not caring for self. He is agitated in ED, psychotic and disorganized. He is acutely decompensated from baseline with poor insight and unpredictable behavior. He requires inpatient admission for safety and stabilization.    10/8: patient seen for follow up today, hyperverbal, illogical, disorganized, verbally dismissive and aggressive towards CL team, refusing to speak with providers, patient states "psychiatry is no good," staff report no violence or aggressive at present, 1:1 constant observation maintained.  10/9: pt seen for f/u, 1:1 at bedside. per staff, pt did not sleep at all overnight. pt uncooperative with interview, states he does not need psychiatry because he is not crazy. required prn Zyprexa 5mg IM x1 overnight. Serum lithium level 0.5 on arrival. Pending medical work up of new RBBB on EKG compared to prior; ordered for echo.  10/10: sleeping after overnight PRNs/not sleeping. Irritable when engaged, poor insight  10/11: labile, disorganized, impulsive, manic  10/12: flight of ideas, disorganized, manic, euphoric   10/13: flight of ideas, manic, remembers previous delusions and continues to talk about them   10/14: manic, remembers previous delusions, grandiose delusions, AAOx2 (disoriented to time), flight of ideas still present but improving     PLAN:  -change to enhanced supervision for elopement risk, disorganization  -c/w Lithium 300mg tid-monitor bun/creat, sodium level, *AVOID NSAIDS  -c/w Melatonin 5mg hs  - INCREASE Seroquel to 400mg HS-hold if qtc >500  -c/w Anxiety/Mild Agitation PRN: Seroquel 25mg PO q6h prn  -c/w Severe agitation PRN: Zyprexa 5mg IM Q 6PRN  -c/w Severe Agitation: Haldol/Benadryl PRN as you are  -Monitor EKG qtc interval, HOLD antipsychotic if qtc >500  -CL to follow; dispo to inpatient psych when done with COVID isolation/bed opens. 2PC in chart  -Patient CANNOT leave AMA

## 2022-10-14 NOTE — PROGRESS NOTE ADULT - PROBLEM SELECTOR PLAN 4
Home meds: Lithium 300mg TID, Seroquel 400mg qHS, Buproprion 100mg   - Continue Lithium 300mg TID   - Seroquel increased to 300mg qHS  - Buproprion on hold    - Psych following, recs appreciated

## 2022-10-14 NOTE — PHYSICAL THERAPY INITIAL EVALUATION ADULT - PERTINENT HX OF CURRENT PROBLEM, REHAB EVAL
67-year-old male with DM2, bipolar 1 disorder/MDD, BPH, presenting from [facility] w/agitation, intermittently refusing medication, refusing care. Reportedly no recent changes in medications. Patient has a history of prior psychiatric hospitalizations. Patient is without complaint, disorganized thoughts, limited historian.

## 2022-10-14 NOTE — PROGRESS NOTE ADULT - ASSESSMENT
67M male with DM2, bipolar 1 disorder/MDD, BPH, presenting from [facility] w/agitation, intermittently refusing medication, found to incidentally have RBBB, now with nosocomial COVID.

## 2022-10-14 NOTE — PROGRESS NOTE ADULT - PROBLEM SELECTOR PLAN 1
Nosocomial COVID+  - Afebrile x24 hours, continue to monitor fever curve   - CXR reviewed, clear lungs on my read    - Offered Remdesivir given age and fever - D/w patient's brother Dr. Nikolai Kevin, would prefer to observe off for now   - Symptomatic treatment Nosocomial COVID+  - Afebrile x24 hours, continue to monitor fever curve   - CXR reviewed, clear lungs on my read    - Offered Remdesivir given age and fever - D/w patient's brother Dr. Nikolai Kevin, would prefer to observe off for now   - Symptomatic treatment  - DVT ppx: lovenox

## 2022-10-14 NOTE — PROGRESS NOTE ADULT - SUBJECTIVE AND OBJECTIVE BOX
[facility] Division of Hospital Medicine  Tori Yarbrough MD  Pager: [phone]      Patient is a 67y old  Male who presents with a chief complaint of agitation (13 Oct 2022 12:18)      SUBJECTIVE / OVERNIGHT EVENTS: Patient seen and examined. States all he needs is "lithium and seroquel" and states he has a "cold". Denies chest pain, SOB, fevers, chills. Per PCA, ate breakfast and ambulated to bathroom with assistance.     MEDICATIONS  (STANDING):  aspirin enteric coated 81 milliGRAM(s) Oral daily  dextrose 5%. 1000 milliLiter(s) (50 mL/Hr) IV Continuous <Continuous>  dextrose 5%. 1000 milliLiter(s) (100 mL/Hr) IV Continuous <Continuous>  dextrose 50% Injectable 25 Gram(s) IV Push once  dextrose 50% Injectable 12.5 Gram(s) IV Push once  dextrose 50% Injectable 25 Gram(s) IV Push once  enoxaparin Injectable 40 milliGRAM(s) SubCutaneous every 24 hours  glucagon  Injectable 1 milliGRAM(s) IntraMuscular once  insulin glargine Injectable (LANTUS) 20 Unit(s) SubCutaneous at bedtime  insulin lispro (ADMELOG) corrective regimen sliding scale   SubCutaneous three times a day before meals  insulin lispro (ADMELOG) corrective regimen sliding scale   SubCutaneous at bedtime  lithium 300 milliGRAM(s) Oral three times a day  melatonin 3 milliGRAM(s) Oral at bedtime  polyethylene glycol 3350 17 Gram(s) Oral daily  QUEtiapine 300 milliGRAM(s) Oral at bedtime  sodium bicarbonate 650 milliGRAM(s) Oral two times a day  tamsulosin 0.4 milliGRAM(s) Oral at bedtime    MEDICATIONS  (PRN):  acetaminophen     Tablet .. 650 milliGRAM(s) Oral every 6 hours PRN Temp greater or equal to 38C (100.4F), Mild Pain (1 - 3), Moderate Pain (4 - 6)  bisacodyl 5 milliGRAM(s) Oral every 12 hours PRN Constipation  dextrose Oral Gel 15 Gram(s) Oral once PRN Blood Glucose LESS THAN 70 milliGRAM(s)/deciliter  OLANZapine Injectable 5 milliGRAM(s) IntraMuscular every 6 hours PRN Severe agitation  QUEtiapine 25 milliGRAM(s) Oral every 6 hours PRN Anxiety/agitation      CAPILLARY BLOOD GLUCOSE      POCT Blood Glucose.: 178 mg/dL (14 Oct 2022 12:21)  POCT Blood Glucose.: 158 mg/dL (14 Oct 2022 08:46)  POCT Blood Glucose.: 155 mg/dL (14 Oct 2022 07:55)  POCT Blood Glucose.: 150 mg/dL (13 Oct 2022 23:00)  POCT Blood Glucose.: 174 mg/dL (13 Oct 2022 21:32)  POCT Blood Glucose.: 156 mg/dL (13 Oct 2022 17:11)    I&O's Summary      PHYSICAL EXAM:  Vital Signs Last 24 Hrs  T(C): 36.4 (14 Oct 2022 11:18), Max: 37.1 (13 Oct 2022 21:37)  T(F): 97.5 (14 Oct 2022 11:18), Max: 98.7 (13 Oct 2022 21:37)  HR: 79 (14 Oct 2022 11:18) (79 - 87)  BP: 117/79 (14 Oct 2022 11:18) (117/79 - 150/94)  BP(mean): --  RR: 18 (14 Oct 2022 11:18) (17 - 19)  SpO2: 97% (14 Oct 2022 11:18) (97% - 100%)    Parameters below as of 14 Oct 2022 11:18  Patient On (Oxygen Delivery Method): room air      CONSTITUTIONAL: NAD, well-developed, well-groomed  EYES: Conjunctiva and sclera clear  ENMT: Moist oral mucosa  RESPIRATORY: Normal respiratory effort; lungs are clear to auscultation bilaterally  CARDIOVASCULAR:  S1/S2; No lower extremity edema  ABDOMEN: Nontender to palpation, normoactive bowel sounds, no rebound/guarding  MUSCULOSKELETAL:  no clubbing or cyanosis of digits; no joint swelling or tenderness to palpation  PSYCH: tangential and illogical; not agitated   NEUROLOGY: Moving all ext   SKIN: No rashes; no palpable lesions    LABS:                        15.4   13.89 )-----------( 304      ( 14 Oct 2022 06:30 )             45.4     10-14    137  |  104  |  18  ----------------------------<  177<H>  4.1   |  19<L>  |  0.77    Ca    9.2      14 Oct 2022 06:30  Phos  2.9     10-14  Mg     2.10     10-14    TPro  7.5  /  Alb  3.7  /  TBili  0.4  /  DBili  x   /  AST  13  /  ALT  17  /  AlkPhos  94  10-14      COORDINATION OF CARE:  Care Discussed with Consultants/Other Providers [Y/N]: Yes - D/w Dr. Luna, [facility] unit opened at [facility] for transfer if bed available   Prior or Outpatient Records Reviewed [Y/N]:

## 2022-10-14 NOTE — PROGRESS NOTE ADULT - PROBLEM SELECTOR PLAN 7
DVT ppx: Lovenox  Dispo: COVID+ unit at [facility], patient is stable for transfer if bed is available    31 minutes spent on discharge planning      Discussed w/ ACP Tori   D/w patient's brother Dr. Nikolai Kevin and updated

## 2022-10-15 LAB
ALBUMIN SERPL ELPH-MCNC: 3.4 G/DL — SIGNIFICANT CHANGE UP (ref 3.3–5)
ALP SERPL-CCNC: 78 U/L — SIGNIFICANT CHANGE UP (ref 40–120)
ALT FLD-CCNC: 13 U/L — SIGNIFICANT CHANGE UP (ref 4–41)
ANION GAP SERPL CALC-SCNC: 12 MMOL/L — SIGNIFICANT CHANGE UP (ref 7–14)
AST SERPL-CCNC: 11 U/L — SIGNIFICANT CHANGE UP (ref 4–40)
BASOPHILS # BLD AUTO: 0.05 K/UL — SIGNIFICANT CHANGE UP (ref 0–0.2)
BASOPHILS NFR BLD AUTO: 0.5 % — SIGNIFICANT CHANGE UP (ref 0–2)
BILIRUB SERPL-MCNC: 0.4 MG/DL — SIGNIFICANT CHANGE UP (ref 0.2–1.2)
BUN SERPL-MCNC: 19 MG/DL — SIGNIFICANT CHANGE UP (ref 7–23)
CALCIUM SERPL-MCNC: 9.2 MG/DL — SIGNIFICANT CHANGE UP (ref 8.4–10.5)
CHLORIDE SERPL-SCNC: 103 MMOL/L — SIGNIFICANT CHANGE UP (ref 98–107)
CO2 SERPL-SCNC: 20 MMOL/L — LOW (ref 22–31)
CREAT SERPL-MCNC: 0.82 MG/DL — SIGNIFICANT CHANGE UP (ref 0.5–1.3)
EGFR: 97 ML/MIN/1.73M2 — SIGNIFICANT CHANGE UP
EOSINOPHIL # BLD AUTO: 0.4 K/UL — SIGNIFICANT CHANGE UP (ref 0–0.5)
EOSINOPHIL NFR BLD AUTO: 3.6 % — SIGNIFICANT CHANGE UP (ref 0–6)
GLUCOSE BLDC GLUCOMTR-MCNC: 110 MG/DL — HIGH (ref 70–99)
GLUCOSE BLDC GLUCOMTR-MCNC: 139 MG/DL — HIGH (ref 70–99)
GLUCOSE BLDC GLUCOMTR-MCNC: 184 MG/DL — HIGH (ref 70–99)
GLUCOSE BLDC GLUCOMTR-MCNC: 191 MG/DL — HIGH (ref 70–99)
GLUCOSE SERPL-MCNC: 143 MG/DL — HIGH (ref 70–99)
HCT VFR BLD CALC: 42.7 % — SIGNIFICANT CHANGE UP (ref 39–50)
HGB BLD-MCNC: 13.9 G/DL — SIGNIFICANT CHANGE UP (ref 13–17)
IANC: 6.45 K/UL — SIGNIFICANT CHANGE UP (ref 1.8–7.4)
IMM GRANULOCYTES NFR BLD AUTO: 0.4 % — SIGNIFICANT CHANGE UP (ref 0–0.9)
LYMPHOCYTES # BLD AUTO: 29.1 % — SIGNIFICANT CHANGE UP (ref 13–44)
LYMPHOCYTES # BLD AUTO: 3.22 K/UL — SIGNIFICANT CHANGE UP (ref 1–3.3)
MAGNESIUM SERPL-MCNC: 2.1 MG/DL — SIGNIFICANT CHANGE UP (ref 1.6–2.6)
MCHC RBC-ENTMCNC: 28.5 PG — SIGNIFICANT CHANGE UP (ref 27–34)
MCHC RBC-ENTMCNC: 32.6 GM/DL — SIGNIFICANT CHANGE UP (ref 32–36)
MCV RBC AUTO: 87.5 FL — SIGNIFICANT CHANGE UP (ref 80–100)
MONOCYTES # BLD AUTO: 0.89 K/UL — SIGNIFICANT CHANGE UP (ref 0–0.9)
MONOCYTES NFR BLD AUTO: 8.1 % — SIGNIFICANT CHANGE UP (ref 2–14)
NEUTROPHILS # BLD AUTO: 6.45 K/UL — SIGNIFICANT CHANGE UP (ref 1.8–7.4)
NEUTROPHILS NFR BLD AUTO: 58.3 % — SIGNIFICANT CHANGE UP (ref 43–77)
NRBC # BLD: 0 /100 WBCS — SIGNIFICANT CHANGE UP (ref 0–0)
NRBC # FLD: 0 K/UL — SIGNIFICANT CHANGE UP (ref 0–0)
PHOSPHATE SERPL-MCNC: 3.2 MG/DL — SIGNIFICANT CHANGE UP (ref 2.5–4.5)
PLATELET # BLD AUTO: 327 K/UL — SIGNIFICANT CHANGE UP (ref 150–400)
POTASSIUM SERPL-MCNC: 4.4 MMOL/L — SIGNIFICANT CHANGE UP (ref 3.5–5.3)
POTASSIUM SERPL-SCNC: 4.4 MMOL/L — SIGNIFICANT CHANGE UP (ref 3.5–5.3)
PROT SERPL-MCNC: 6.7 G/DL — SIGNIFICANT CHANGE UP (ref 6–8.3)
RBC # BLD: 4.88 M/UL — SIGNIFICANT CHANGE UP (ref 4.2–5.8)
RBC # FLD: 13.9 % — SIGNIFICANT CHANGE UP (ref 10.3–14.5)
SODIUM SERPL-SCNC: 135 MMOL/L — SIGNIFICANT CHANGE UP (ref 135–145)
WBC # BLD: 11.05 K/UL — HIGH (ref 3.8–10.5)
WBC # FLD AUTO: 11.05 K/UL — HIGH (ref 3.8–10.5)

## 2022-10-15 PROCEDURE — 99232 SBSQ HOSP IP/OBS MODERATE 35: CPT

## 2022-10-15 RX ADMIN — TAMSULOSIN HYDROCHLORIDE 0.4 MILLIGRAM(S): 0.4 CAPSULE ORAL at 22:56

## 2022-10-15 RX ADMIN — LITHIUM CARBONATE 300 MILLIGRAM(S): 300 TABLET, EXTENDED RELEASE ORAL at 13:58

## 2022-10-15 RX ADMIN — QUETIAPINE FUMARATE 400 MILLIGRAM(S): 200 TABLET, FILM COATED ORAL at 22:02

## 2022-10-15 RX ADMIN — Medication 650 MILLIGRAM(S): at 17:28

## 2022-10-15 RX ADMIN — INSULIN GLARGINE 20 UNIT(S): 100 INJECTION, SOLUTION SUBCUTANEOUS at 22:22

## 2022-10-15 RX ADMIN — OLANZAPINE 5 MILLIGRAM(S): 15 TABLET, FILM COATED ORAL at 22:48

## 2022-10-15 RX ADMIN — Medication 1: at 12:30

## 2022-10-15 RX ADMIN — OLANZAPINE 5 MILLIGRAM(S): 15 TABLET, FILM COATED ORAL at 06:38

## 2022-10-15 RX ADMIN — ENOXAPARIN SODIUM 40 MILLIGRAM(S): 100 INJECTION SUBCUTANEOUS at 17:29

## 2022-10-15 RX ADMIN — Medication 650 MILLIGRAM(S): at 06:24

## 2022-10-15 RX ADMIN — LITHIUM CARBONATE 300 MILLIGRAM(S): 300 TABLET, EXTENDED RELEASE ORAL at 22:02

## 2022-10-15 RX ADMIN — LITHIUM CARBONATE 300 MILLIGRAM(S): 300 TABLET, EXTENDED RELEASE ORAL at 06:24

## 2022-10-15 RX ADMIN — Medication 81 MILLIGRAM(S): at 12:14

## 2022-10-15 NOTE — PROGRESS NOTE ADULT - PROBLEM SELECTOR PLAN 5
Home regimen: Lantus 25U qHS   - decreased home dose of insulin glargine by 20% on admission, FS currently well controlled on Lantus 20u qHS + ISS  - Monitor FS and adjust as needed Home regimen: Lantus 25U qHS   - decreased home dose of insulin glargine by 20% on admission  - FS currently well controlled on Lantus 20u qHS + ISS  - Monitor FS and adjust as needed

## 2022-10-15 NOTE — PROGRESS NOTE ADULT - PROBLEM SELECTOR PLAN 4
Home meds: Lithium 300mg TID, Seroquel 400mg qHS, Buproprion 100mg   - Continue Lithium 300mg TID   - Seroquel increased to 300mg qHS  - Buproprion on hold    - Psych following, recs appreciated Home meds: Lithium 300mg TID, Seroquel 400mg qHS, Buproprion 100mg   - Continue Lithium 300mg TID, monitor BUN/Cr  - increased Seroquel 400mg qHS  -c/w Anxiety/Mild Agitation PRN: Seroquel 25mg PO q6h prn  -c/w Severe agitation PRN: Zyprexa 5mg IM Q 6PRN  -c/w Severe Agitation: Haldol/Benadryl PRN  - Buproprion on hold    - Psych following, recs appreciated

## 2022-10-15 NOTE — PROGRESS NOTE ADULT - SUBJECTIVE AND OBJECTIVE BOX
[facility] Division of Hospital Medicine  Lou Nolasco DO  Pager (ENRIQUE-F, 3S-5P): [phone]      Patient is a 67y old  Male who presents with a chief complaint of agitation (14 Oct 2022 13:47)      SUBJECTIVE / OVERNIGHT EVENTS: No acute events overnight. Denies chest pain, cough, SOB. Asking for a jacket    MEDICATIONS  (STANDING):  aspirin enteric coated 81 milliGRAM(s) Oral daily  dextrose 5%. 1000 milliLiter(s) (50 mL/Hr) IV Continuous <Continuous>  dextrose 5%. 1000 milliLiter(s) (100 mL/Hr) IV Continuous <Continuous>  dextrose 50% Injectable 25 Gram(s) IV Push once  dextrose 50% Injectable 12.5 Gram(s) IV Push once  dextrose 50% Injectable 25 Gram(s) IV Push once  enoxaparin Injectable 40 milliGRAM(s) SubCutaneous every 24 hours  glucagon  Injectable 1 milliGRAM(s) IntraMuscular once  insulin glargine Injectable (LANTUS) 20 Unit(s) SubCutaneous at bedtime  insulin lispro (ADMELOG) corrective regimen sliding scale   SubCutaneous three times a day before meals  insulin lispro (ADMELOG) corrective regimen sliding scale   SubCutaneous at bedtime  lithium 300 milliGRAM(s) Oral three times a day  melatonin 3 milliGRAM(s) Oral at bedtime  polyethylene glycol 3350 17 Gram(s) Oral daily  QUEtiapine 400 milliGRAM(s) Oral at bedtime  sodium bicarbonate 650 milliGRAM(s) Oral two times a day  tamsulosin 0.4 milliGRAM(s) Oral at bedtime    MEDICATIONS  (PRN):  acetaminophen     Tablet .. 650 milliGRAM(s) Oral every 6 hours PRN Temp greater or equal to 38C (100.4F), Mild Pain (1 - 3), Moderate Pain (4 - 6)  bisacodyl 5 milliGRAM(s) Oral every 12 hours PRN Constipation  dextrose Oral Gel 15 Gram(s) Oral once PRN Blood Glucose LESS THAN 70 milliGRAM(s)/deciliter  OLANZapine Injectable 5 milliGRAM(s) IntraMuscular every 6 hours PRN Severe agitation  QUEtiapine 25 milliGRAM(s) Oral every 6 hours PRN Anxiety/agitation      CAPILLARY BLOOD GLUCOSE      POCT Blood Glucose.: 139 mg/dL (15 Oct 2022 09:01)  POCT Blood Glucose.: 133 mg/dL (14 Oct 2022 22:40)  POCT Blood Glucose.: 127 mg/dL (14 Oct 2022 18:41)  POCT Blood Glucose.: 178 mg/dL (14 Oct 2022 12:21)    I&O's Summary      PHYSICAL EXAM:  Vital Signs Last 24 Hrs  T(C): 36.3 (15 Oct 2022 08:00), Max: 36.6 (14 Oct 2022 19:34)  T(F): 97.3 (15 Oct 2022 08:00), Max: 97.9 (14 Oct 2022 19:34)  HR: 71 (15 Oct 2022 08:00) (71 - 84)  BP: 118/73 (15 Oct 2022 08:00) (117/79 - 140/81)  BP(mean): --  RR: 17 (15 Oct 2022 08:00) (16 - 18)  SpO2: 100% (15 Oct 2022 08:00) (97% - 100%)    Parameters below as of 15 Oct 2022 08:00  Patient On (Oxygen Delivery Method): room air        CONSTITUTIONAL: NAD, on room air  HEENT: sclera clear, MMM, eyes tracking  RESPIRATORY: Normal respiratory effort; lungs are clear to auscultation bilaterally  CARDIOVASCULAR: S1/S2, RRR, no murmurs appreciated; No lower extremity edema  ABDOMEN: soft, Nontender, nondistended, normoactive bowel sounds, no rebound/guarding  PSYCH: calm, cooperative  NEUROLOGY: awake, alert, no gross deficits, moving all extremities  SKIN: No rashes; no palpable lesions    LABS:                        13.9   11.05 )-----------( 327      ( 15 Oct 2022 07:00 )             42.7     10-15    135  |  103  |  19  ----------------------------<  143<H>  4.4   |  20<L>  |  0.82    Ca    9.2      15 Oct 2022 07:00  Phos  3.2     10-15  Mg     2.10     10-15    TPro  6.7  /  Alb  3.4  /  TBili  0.4  /  DBili  x   /  AST  11  /  ALT  13  /  AlkPhos  78  10-15                RADIOLOGY & ADDITIONAL TESTS:  Results Reviewed:   Imaging Personally Reviewed:  Electrocardiogram Personally Reviewed:    COORDINATION OF CARE:  Care Discussed with Consultants/Other Providers [Y/N]:  Prior or Outpatient Records Reviewed [Y/N]:   [facility] Division of Hospital Medicine  Lou Nolasco DO  Pager (ENRIQUE-F, 8E-5P): [phone]      Patient is a 67y old  Male who presents with a chief complaint of agitation (14 Oct 2022 13:47)      SUBJECTIVE / OVERNIGHT EVENTS: No acute events overnight. Denies chest pain, cough, SOB. Asking for a jacket    MEDICATIONS  (STANDING):  aspirin enteric coated 81 milliGRAM(s) Oral daily  dextrose 5%. 1000 milliLiter(s) (50 mL/Hr) IV Continuous <Continuous>  dextrose 5%. 1000 milliLiter(s) (100 mL/Hr) IV Continuous <Continuous>  dextrose 50% Injectable 25 Gram(s) IV Push once  dextrose 50% Injectable 12.5 Gram(s) IV Push once  dextrose 50% Injectable 25 Gram(s) IV Push once  enoxaparin Injectable 40 milliGRAM(s) SubCutaneous every 24 hours  glucagon  Injectable 1 milliGRAM(s) IntraMuscular once  insulin glargine Injectable (LANTUS) 20 Unit(s) SubCutaneous at bedtime  insulin lispro (ADMELOG) corrective regimen sliding scale   SubCutaneous three times a day before meals  insulin lispro (ADMELOG) corrective regimen sliding scale   SubCutaneous at bedtime  lithium 300 milliGRAM(s) Oral three times a day  melatonin 3 milliGRAM(s) Oral at bedtime  polyethylene glycol 3350 17 Gram(s) Oral daily  QUEtiapine 400 milliGRAM(s) Oral at bedtime  sodium bicarbonate 650 milliGRAM(s) Oral two times a day  tamsulosin 0.4 milliGRAM(s) Oral at bedtime    MEDICATIONS  (PRN):  acetaminophen     Tablet .. 650 milliGRAM(s) Oral every 6 hours PRN Temp greater or equal to 38C (100.4F), Mild Pain (1 - 3), Moderate Pain (4 - 6)  bisacodyl 5 milliGRAM(s) Oral every 12 hours PRN Constipation  dextrose Oral Gel 15 Gram(s) Oral once PRN Blood Glucose LESS THAN 70 milliGRAM(s)/deciliter  OLANZapine Injectable 5 milliGRAM(s) IntraMuscular every 6 hours PRN Severe agitation  QUEtiapine 25 milliGRAM(s) Oral every 6 hours PRN Anxiety/agitation      CAPILLARY BLOOD GLUCOSE      POCT Blood Glucose.: 139 mg/dL (15 Oct 2022 09:01)  POCT Blood Glucose.: 133 mg/dL (14 Oct 2022 22:40)  POCT Blood Glucose.: 127 mg/dL (14 Oct 2022 18:41)  POCT Blood Glucose.: 178 mg/dL (14 Oct 2022 12:21)    I&O's Summary      PHYSICAL EXAM:  Vital Signs Last 24 Hrs  T(C): 36.3 (15 Oct 2022 08:00), Max: 36.6 (14 Oct 2022 19:34)  T(F): 97.3 (15 Oct 2022 08:00), Max: 97.9 (14 Oct 2022 19:34)  HR: 71 (15 Oct 2022 08:00) (71 - 84)  BP: 118/73 (15 Oct 2022 08:00) (117/79 - 140/81)  BP(mean): --  RR: 17 (15 Oct 2022 08:00) (16 - 18)  SpO2: 100% (15 Oct 2022 08:00) (97% - 100%)    Parameters below as of 15 Oct 2022 08:00  Patient On (Oxygen Delivery Method): room air      CONSTITUTIONAL: NAD, on RA  EYES: Conjunctiva and sclera clear  ENMT: Moist oral mucosa  RESPIRATORY: Normal respiratory effort; lungs are clear to auscultation bilaterally  CARDIOVASCULAR:  S1/S2; No lower extremity edema  ABDOMEN: Nontender to palpation, normoactive bowel sounds, no rebound/guarding  PSYCH: tangential and illogical; disorganized thoughts, not agitated   NEUROLOGY: Moving all extremities  SKIN: No rashes; no palpable lesions    LABS:                        13.9   11.05 )-----------( 327      ( 15 Oct 2022 07:00 )             42.7     10-15    135  |  103  |  19  ----------------------------<  143<H>  4.4   |  20<L>  |  0.82    Ca    9.2      15 Oct 2022 07:00  Phos  3.2     10-15  Mg     2.10     10-15    TPro  6.7  /  Alb  3.4  /  TBili  0.4  /  DBili  x   /  AST  11  /  ALT  13  /  AlkPhos  78  10-15                RADIOLOGY & ADDITIONAL TESTS:  Results Reviewed:   Imaging Personally Reviewed:  Electrocardiogram Personally Reviewed:    COORDINATION OF CARE:  Care Discussed with Consultants/Other Providers [Y/N]:  Prior or Outpatient Records Reviewed [Y/N]:

## 2022-10-15 NOTE — PROGRESS NOTE ADULT - PROBLEM SELECTOR PLAN 1
Nosocomial COVID+  - Afebrile x24 hours, continue to monitor fever curve   - CXR reviewed, clear lungs on my read    - Offered Remdesivir given age and fever - D/w patient's brother Dr. Nikolai Kevin, would prefer to observe off for now   - Symptomatic treatment  - DVT ppx: lovenox Nosocomial COVID+  - Afebrile, continue to monitor  - CXR: clear lungs  - Offered Remdesivir given age and fever -Patient's brother Dr. Nikolai Kevin, prefers to observe off for now   - conservative/symptomatic management  - DVT ppx: lovenox

## 2022-10-15 NOTE — PROGRESS NOTE ADULT - PROBLEM SELECTOR PLAN 7
DVT ppx: Lovenox  Dispo: COVID+ unit at [facility], patient is stable for transfer if bed is available    31 minutes spent on discharge planning      Discussed w/ ACP Tori   D/w patient's brother Dr. Nikolai Kevin and updated DVT ppx: Lovenox  Dispo: COVID+ unit at [facility], patient is stable for transfer if bed is available

## 2022-10-15 NOTE — PROGRESS NOTE ADULT - PROBLEM SELECTOR PLAN 2
New in comparison to EKG from 2009  - Patient is asymptomatic   - Delta trop negative   - Chest pain free, no evidence of CHF    - pro-  - TTE reviewed and within normal limits   - monitor on tele, however patient is refusing   - No further inpatient work-up needed at this time, can follow-up with outpatient cards New in comparison to EKG from 2009  - Patient is asymptomatic   - Delta trop negative   - Chest pain free, no evidence of CHF, pro-  - TTE reviewed and within normal limits   - No further inpatient work-up needed at this time, can follow-up with outpatient cards

## 2022-10-15 NOTE — PROGRESS NOTE ADULT - ASSESSMENT
67M male with DM2, bipolar 1 disorder/MDD, BPH, presenting from [facility] w/agitation, intermittently refusing medication, found to incidentally have RBBB, now with nosocomial COVID.

## 2022-10-16 LAB
GLUCOSE BLDC GLUCOMTR-MCNC: 106 MG/DL — HIGH (ref 70–99)
GLUCOSE BLDC GLUCOMTR-MCNC: 119 MG/DL — HIGH (ref 70–99)
GLUCOSE BLDC GLUCOMTR-MCNC: 130 MG/DL — HIGH (ref 70–99)
GLUCOSE BLDC GLUCOMTR-MCNC: 214 MG/DL — HIGH (ref 70–99)

## 2022-10-16 PROCEDURE — 99232 SBSQ HOSP IP/OBS MODERATE 35: CPT

## 2022-10-16 RX ORDER — POLYETHYLENE GLYCOL 3350 17 G/17G
17 POWDER, FOR SOLUTION ORAL DAILY
Refills: 0 | Status: DISCONTINUED | OUTPATIENT
Start: 2022-10-16 | End: 2022-10-19

## 2022-10-16 RX ADMIN — ENOXAPARIN SODIUM 40 MILLIGRAM(S): 100 INJECTION SUBCUTANEOUS at 17:26

## 2022-10-16 RX ADMIN — INSULIN GLARGINE 20 UNIT(S): 100 INJECTION, SOLUTION SUBCUTANEOUS at 21:27

## 2022-10-16 RX ADMIN — LITHIUM CARBONATE 300 MILLIGRAM(S): 300 TABLET, EXTENDED RELEASE ORAL at 05:57

## 2022-10-16 RX ADMIN — QUETIAPINE FUMARATE 400 MILLIGRAM(S): 200 TABLET, FILM COATED ORAL at 21:22

## 2022-10-16 RX ADMIN — Medication 81 MILLIGRAM(S): at 11:42

## 2022-10-16 RX ADMIN — Medication 650 MILLIGRAM(S): at 17:26

## 2022-10-16 RX ADMIN — Medication 2: at 13:04

## 2022-10-16 RX ADMIN — LITHIUM CARBONATE 300 MILLIGRAM(S): 300 TABLET, EXTENDED RELEASE ORAL at 21:23

## 2022-10-16 RX ADMIN — LITHIUM CARBONATE 300 MILLIGRAM(S): 300 TABLET, EXTENDED RELEASE ORAL at 13:03

## 2022-10-16 RX ADMIN — TAMSULOSIN HYDROCHLORIDE 0.4 MILLIGRAM(S): 0.4 CAPSULE ORAL at 21:22

## 2022-10-16 RX ADMIN — Medication 650 MILLIGRAM(S): at 05:57

## 2022-10-16 RX ADMIN — Medication 3 MILLIGRAM(S): at 21:22

## 2022-10-16 NOTE — PROGRESS NOTE ADULT - SUBJECTIVE AND OBJECTIVE BOX
[facility] Division of Hospital Medicine  Lou Nolasco DO  Pager (ENRIQUE-VIKTOR, 7N-5P): [phone]      Patient is a 67y old  Male who presents with a chief complaint of agitation (15 Oct 2022 10:26)      SUBJECTIVE / OVERNIGHT EVENTS: No acute events overnight. No complaints of pain, SOB.    MEDICATIONS  (STANDING):  aspirin enteric coated 81 milliGRAM(s) Oral daily  dextrose 5%. 1000 milliLiter(s) (50 mL/Hr) IV Continuous <Continuous>  dextrose 5%. 1000 milliLiter(s) (100 mL/Hr) IV Continuous <Continuous>  dextrose 50% Injectable 25 Gram(s) IV Push once  dextrose 50% Injectable 12.5 Gram(s) IV Push once  dextrose 50% Injectable 25 Gram(s) IV Push once  enoxaparin Injectable 40 milliGRAM(s) SubCutaneous every 24 hours  glucagon  Injectable 1 milliGRAM(s) IntraMuscular once  insulin glargine Injectable (LANTUS) 20 Unit(s) SubCutaneous at bedtime  insulin lispro (ADMELOG) corrective regimen sliding scale   SubCutaneous three times a day before meals  insulin lispro (ADMELOG) corrective regimen sliding scale   SubCutaneous at bedtime  lithium 300 milliGRAM(s) Oral three times a day  melatonin 3 milliGRAM(s) Oral at bedtime  polyethylene glycol 3350 17 Gram(s) Oral daily  QUEtiapine 400 milliGRAM(s) Oral at bedtime  sodium bicarbonate 650 milliGRAM(s) Oral two times a day  tamsulosin 0.4 milliGRAM(s) Oral at bedtime    MEDICATIONS  (PRN):  acetaminophen     Tablet .. 650 milliGRAM(s) Oral every 6 hours PRN Temp greater or equal to 38C (100.4F), Mild Pain (1 - 3), Moderate Pain (4 - 6)  bisacodyl 5 milliGRAM(s) Oral every 12 hours PRN Constipation  dextrose Oral Gel 15 Gram(s) Oral once PRN Blood Glucose LESS THAN 70 milliGRAM(s)/deciliter  OLANZapine Injectable 5 milliGRAM(s) IntraMuscular every 6 hours PRN Severe agitation  QUEtiapine 25 milliGRAM(s) Oral every 6 hours PRN Anxiety/agitation      CAPILLARY BLOOD GLUCOSE      POCT Blood Glucose.: 119 mg/dL (16 Oct 2022 08:23)  POCT Blood Glucose.: 184 mg/dL (15 Oct 2022 21:08)  POCT Blood Glucose.: 110 mg/dL (15 Oct 2022 17:33)  POCT Blood Glucose.: 191 mg/dL (15 Oct 2022 12:19)    I&O's Summary      PHYSICAL EXAM:  Vital Signs Last 24 Hrs  T(C): 36.9 (16 Oct 2022 08:00), Max: 36.9 (15 Oct 2022 17:15)  T(F): 98.4 (16 Oct 2022 08:00), Max: 98.4 (15 Oct 2022 17:15)  HR: 70 (16 Oct 2022 08:00) (65 - 76)  BP: 126/86 (16 Oct 2022 08:00) (126/85 - 143/80)  BP(mean): --  RR: 17 (16 Oct 2022 08:00) (16 - 18)  SpO2: 100% (16 Oct 2022 08:00) (100% - 100%)    Parameters below as of 16 Oct 2022 08:00  Patient On (Oxygen Delivery Method): room air      CONSTITUTIONAL: NAD, on RA  EYES: Conjunctiva and sclera clear  ENMT: Moist oral mucosa  RESPIRATORY: Normal respiratory effort; lungs are clear to auscultation bilaterally  CARDIOVASCULAR:  S1/S2; No lower extremity edema  ABDOMEN: Nontender to palpation, normoactive bowel sounds, no rebound/guarding  PSYCH: calm, disorganized thoughts  NEUROLOGY: no gross deficits  SKIN: No rashes; no palpable lesions    LABS:                        13.9   11.05 )-----------( 327      ( 15 Oct 2022 07:00 )             42.7     10-15    135  |  103  |  19  ----------------------------<  143<H>  4.4   |  20<L>  |  0.82    Ca    9.2      15 Oct 2022 07:00  Phos  3.2     10-15  Mg     2.10     10-15    TPro  6.7  /  Alb  3.4  /  TBili  0.4  /  DBili  x   /  AST  11  /  ALT  13  /  AlkPhos  78  10-15                RADIOLOGY & ADDITIONAL TESTS:  Results Reviewed:   Imaging Personally Reviewed:  Electrocardiogram Personally Reviewed:    COORDINATION OF CARE:  Care Discussed with Consultants/Other Providers [Y/N]:  Prior or Outpatient Records Reviewed [Y/N]:

## 2022-10-16 NOTE — PROGRESS NOTE ADULT - ASSESSMENT
67M male with DM2, bipolar 1 disorder/MDD, BPH, presenting from [facility] w/agitation, intermittently refusing medication, found to incidentally have RBBB, now with nosocomial COVID.

## 2022-10-16 NOTE — PROGRESS NOTE ADULT - PROBLEM SELECTOR PLAN 5
Home regimen: Lantus 25U qHS   - decreased home dose of insulin glargine by 20% on admission  - FS currently well controlled on Lantus 20u qHS + ISS  - Monitor FS and adjust as needed

## 2022-10-16 NOTE — PROGRESS NOTE ADULT - PROBLEM SELECTOR PLAN 2
New in comparison to EKG from 2009  - Patient is asymptomatic   - Delta trop negative   - Chest pain free, no evidence of CHF, pro-  - TTE reviewed and within normal limits   - No further inpatient work-up needed at this time, can follow-up with outpatient cards

## 2022-10-16 NOTE — PROGRESS NOTE ADULT - PROBLEM SELECTOR PLAN 1
Nosocomial COVID+  - Afebrile, saturating well on RA  - CXR: clear lungs  - Offered Remdesivir given age and fever -Patient's brother Dr. Nikolai Kevin, prefers to observe off for now   - conservative/symptomatic management  - DVT ppx: lovenox

## 2022-10-16 NOTE — PROGRESS NOTE ADULT - PROBLEM SELECTOR PLAN 7
DVT ppx: Lovenox  Dispo: COVID+ unit at [facility], patient is stable for transfer if bed is available

## 2022-10-16 NOTE — PROGRESS NOTE ADULT - PROBLEM SELECTOR PLAN 4
Home meds: Lithium 300mg TID, Seroquel 400mg qHS, Buproprion 100mg   - Continue Lithium 300mg TID, monitor BUN/Cr  - increased Seroquel 400mg qHS  -c/w Anxiety/Mild Agitation PRN: Seroquel 25mg PO q6h prn  -c/w Severe agitation PRN: Zyprexa 5mg IM Q 6PRN  -c/w Severe Agitation: Haldol/Benadryl PRN  - Buproprion on hold    - Psych following, recs appreciated

## 2022-10-17 LAB
GLUCOSE BLDC GLUCOMTR-MCNC: 122 MG/DL — HIGH (ref 70–99)
GLUCOSE BLDC GLUCOMTR-MCNC: 139 MG/DL — HIGH (ref 70–99)
GLUCOSE BLDC GLUCOMTR-MCNC: 160 MG/DL — HIGH (ref 70–99)
GLUCOSE BLDC GLUCOMTR-MCNC: 200 MG/DL — HIGH (ref 70–99)
SARS-COV-2 RNA SPEC QL NAA+PROBE: DETECTED

## 2022-10-17 PROCEDURE — 99233 SBSQ HOSP IP/OBS HIGH 50: CPT

## 2022-10-17 RX ORDER — HYDRALAZINE HCL 50 MG
10 TABLET ORAL ONCE
Refills: 0 | Status: DISCONTINUED | OUTPATIENT
Start: 2022-10-17 | End: 2022-10-17

## 2022-10-17 RX ADMIN — Medication 1: at 13:08

## 2022-10-17 RX ADMIN — Medication 81 MILLIGRAM(S): at 14:00

## 2022-10-17 RX ADMIN — ENOXAPARIN SODIUM 40 MILLIGRAM(S): 100 INJECTION SUBCUTANEOUS at 17:42

## 2022-10-17 RX ADMIN — Medication 650 MILLIGRAM(S): at 05:53

## 2022-10-17 RX ADMIN — Medication 650 MILLIGRAM(S): at 17:41

## 2022-10-17 RX ADMIN — LITHIUM CARBONATE 300 MILLIGRAM(S): 300 TABLET, EXTENDED RELEASE ORAL at 05:53

## 2022-10-17 RX ADMIN — Medication 3 MILLIGRAM(S): at 21:39

## 2022-10-17 RX ADMIN — LITHIUM CARBONATE 300 MILLIGRAM(S): 300 TABLET, EXTENDED RELEASE ORAL at 21:38

## 2022-10-17 RX ADMIN — QUETIAPINE FUMARATE 400 MILLIGRAM(S): 200 TABLET, FILM COATED ORAL at 21:38

## 2022-10-17 RX ADMIN — INSULIN GLARGINE 20 UNIT(S): 100 INJECTION, SOLUTION SUBCUTANEOUS at 21:43

## 2022-10-17 RX ADMIN — LITHIUM CARBONATE 300 MILLIGRAM(S): 300 TABLET, EXTENDED RELEASE ORAL at 14:00

## 2022-10-17 RX ADMIN — TAMSULOSIN HYDROCHLORIDE 0.4 MILLIGRAM(S): 0.4 CAPSULE ORAL at 21:39

## 2022-10-17 NOTE — DIETITIAN INITIAL EVALUATION ADULT - ORAL INTAKE PTA/DIET HISTORY
Patient reports fair appetite in general. Patient lives alone, and denies difficulties to obtain food and groceries. Denies any diet followed PTA. Pt states his blood sugar is "under control" now.

## 2022-10-17 NOTE — PROGRESS NOTE ADULT - PROBLEM SELECTOR PLAN 1
Nosocomial COVID+  - afebrile, saturating well on RA, CXR: clear lungs  - Offered Remdesivir given age and fever - Patient's brother Dr. Nikolai Kevin, prefers to observe off for now   - conservative/symptomatic management  - DVT ppx: lovenox

## 2022-10-17 NOTE — BH CONSULTATION LIAISON PROGRESS NOTE - NSBHFUPINTERVALHXFT_PSY_A_CORE
Chart reviewed. Pt is in isolation due to being COVID positive. Pt took all doses of standing psychiatric medications (Lithium and Seroquel) over the weekend and overnight. 2 doses of Olanzapine 5mg IM PRN given over the weekend on Saturday 10/15/22, one at 6:38 and a second dose at 22:48. No PRNs given overnight from Sunday (10/16/22) into Monday (10/17/22).   On exam today, pt continues to be manic, with continuous grandiose delusions of working in the [employer], going to [employer] in [place], and having a [place] passport. 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 Pt endorses a [place] girlfriend named Evelyn who he last spoke to 18yrs ago, states she knows he's in the hospital. Pt appears better compared to last week, was more interruptible, still mildly pressured speech and flight of ideas, but more organized thoughts. Pt endorses good mood and good sleep, stating he slept for 6hrs. Endorses feeling safe in the hospital and that everyone here is treating him well. Pt denies any visual or auditory hallucinations. AAOx1, states his name is Pedrito, and that it is October 2024, but knows he is in the hospital. When asked, pt states that he's here to "get better".  Chart reviewed. Pt is in isolation due to being COVID positive. Pt took all doses of standing psychiatric medications (Lithium and Seroquel) over the weekend and overnight. 2 doses of Olanzapine 5mg IM PRN given over the weekend on Saturday 10/15/22, one at 6:38 and a second dose at 22:48. No PRNs given overnight from Sunday (10/16/22) into Monday (10/17/22).   On exam today, pt continues to be manic, with continuous grandiose delusions of working in the [employer], going to [employer] in [place], and having a [place] passport. 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 Pt endorses a [place] girlfriend named Evelyn who he last spoke to 18yrs ago, states she knows he's in the hospital. Pt appears better compared to last week, was more interruptible, still mildly pressured speech and flight of ideas, but more organized thoughts. Pt endorses good mood and good sleep, stating he slept for 6hrs. Endorses feeling safe in the hospital and that everyone here is treating him well. Pt denies any visual or auditory hallucinations. AAOx1, states his name is Pedrito, and that it is October 2024, but knows he is in the hospital. When asked, pt states that he's here to "get better".   Dr Juárez coordinated care with primary medicine team-- below plan discussed.

## 2022-10-17 NOTE — DIETITIAN INITIAL EVALUATION ADULT - OTHER INFO
Per chart review, 67M male with hx of bipolar 1 disorder/MDD, T2DM, BPH, presenting from [facility] w/agitation, intermittently refusing medication, found to incidentally have RBBB, now with nosocomial COVID.     Patient seen at bedside, reports good appetite in house. Food preferences explored, pt dislike pork and beef. Pt is amenable to receive double portions of vegetable. Patient requires assistance with feeding per RN flowsheet. Denies chewing and swallowing difficulties. Denies any GI issues (nausea/vomiting/diarrhea/constipation.) Last BM 10/14 offered Prune juice, pt is not interested at this time. Currently on bowel regimen. Patient dx with DMII, labs notable with A1C 5.7 WNL, POCT slightly high range from 133-191. Consistent CHO diet deferred by pt at this time. Recommend continue monitor FS and adjust insulin regimen as needed.

## 2022-10-17 NOTE — PROGRESS NOTE ADULT - PROBLEM SELECTOR PLAN 4
Home meds: Lithium 300mg TID, Seroquel 400mg qHS, Buproprion 100mg   - Continue Lithium 300mg TID, monitor BUN/Cr  - increased Seroquel 400mg qHS, Buproprion on hold    - c/w Anxiety/Mild Agitation PRN: Seroquel 25mg PO q6h prn  - c/w Severe agitation PRN: Zyprexa 5mg IM Q 6PRN   -c/w Severe Agitation: Haldol/Benadryl PRN  - Psych following, recs appreciated

## 2022-10-17 NOTE — BH CONSULTATION LIAISON PROGRESS NOTE - NSICDXBHSECONDARYDX_PSY_ALL_CORE
Bipolar 1 disorder   F31.9  Type 2 diabetes mellitus, with long-term current use of insulin   E11.9  Need for prophylactic measure   Z29.9  RBBB (right bundle branch block)   I45.10  Agitation   R45.1  BPH (benign prostatic hyperplasia)   N40.0  COVID-19 virus infection   U07.1   Agitation   R45.1  COVID-19 virus infection   U07.1

## 2022-10-17 NOTE — DIETITIAN INITIAL EVALUATION ADULT - PERTINENT LABORATORY DATA
POCT Blood Glucose.: 200 mg/dL (10-17-22 @ 12:34)  A1C with Estimated Average Glucose Result: 5.7 % (10-08-22 @ 10:20)

## 2022-10-17 NOTE — BH CONSULTATION LIAISON PROGRESS NOTE - OTHER
Grandiose  Improved from previous assessments, more interruptible  More organized thoughts compared to previous assessments but still disorganized

## 2022-10-17 NOTE — BH CONSULTATION LIAISON PROGRESS NOTE - NSBHASSESSMENTFT_PSY_ALL_CORE
66yo single, disabled, male currently residing at [facility]. PPHx Bipolar 1 Disorder, multiple inpatient admissions- last as few years ago at [place], multiple past suicide attempts, + history of aggressive behavior in the context of non compliance with medication. No legal issues or substance use issues. PMHx DM2 (insulin dependent), constipation, generalized muscle weakness, BPH. BIBA referred by SNF for agitation.    Patient presents to the ED in the context of agitation. Patient has been non compliant with medications, refusing ADLs and not caring for self. He is agitated in ED, psychotic and disorganized. He is acutely decompensated from baseline with poor insight and unpredictable behavior. He requires inpatient admission for safety and stabilization.    10/8: patient seen for follow up today, hyperverbal, illogical, disorganized, verbally dismissive and aggressive towards CL team, refusing to speak with providers, patient states "psychiatry is no good," staff report no violence or aggressive at present, 1:1 constant observation maintained.  10/9: pt seen for f/u, 1:1 at bedside. per staff, pt did not sleep at all overnight. pt uncooperative with interview, states he does not need psychiatry because he is not crazy. required prn Zyprexa 5mg IM x1 overnight. Serum lithium level 0.5 on arrival. Pending medical work up of new RBBB on EKG compared to prior; ordered for echo.  10/10: sleeping after overnight PRNs/not sleeping. Irritable when engaged, poor insight  10/11: labile, disorganized, impulsive, manic  10/12: flight of ideas, disorganized, manic, euphoric   10/13: flight of ideas, manic, remembers previous delusions and continues to talk about them   10/14: manic, remembers previous delusions, grandiose delusions, AAOx2 (disoriented to time), flight of ideas still present but improving   10/17: manic, 2 doses of Olanzapine PRN IM given over the weekend on 10/15/22 (6:28 and 22:48), continues to talk about previous grandiose delusions, disorientated to name and time, flight of ideas present but improving, mild to moderate pressured speech     PLAN:  -change to enhanced supervision for elopement risk, disorganization  -c/w Lithium 300mg tid-monitor bun/creat, sodium level, *AVOID NSAIDS  -c/w Melatonin 5mg hs  -c/w Seroquel to 400mg HS-hold if qtc >500  -c/w Anxiety/Mild Agitation PRN: Seroquel 25mg PO q6h prn  -c/w Severe agitation PRN: Zyprexa 5mg IM Q 6PRN  -c/w Severe Agitation: Haldol/Benadryl PRN as you are  -Monitor EKG qtc interval, HOLD antipsychotic if qtc >500  -CL to follow; dispo to inpatient psych when done with COVID isolation/bed opens. 2PC in chart  -Patient CANNOT leave AMA 66yo single, disabled, male currently residing at [facility]. PPHx Bipolar 1 Disorder, multiple inpatient admissions- last as few years ago at [place], multiple past suicide attempts, + history of aggressive behavior in the context of non compliance with medication. No legal issues or substance use issues. PMHx DM2 (insulin dependent), constipation, generalized muscle weakness, BPH. BIBA referred by SNF for agitation.    Patient presents to the ED in the context of agitation. Patient has been non compliant with medications, refusing ADLs and not caring for self. He is agitated in ED, psychotic and disorganized. He is acutely decompensated from baseline with poor insight and unpredictable behavior. He requires inpatient admission for safety and stabilization.    10/8: patient seen for follow up today, hyperverbal, illogical, disorganized, verbally dismissive and aggressive towards CL team, refusing to speak with providers, patient states "psychiatry is no good," staff report no violence or aggressive at present, 1:1 constant observation maintained.  10/9: pt seen for f/u, 1:1 at bedside. per staff, pt did not sleep at all overnight. pt uncooperative with interview, states he does not need psychiatry because he is not crazy. required prn Zyprexa 5mg IM x1 overnight. Serum lithium level 0.5 on arrival. Pending medical work up of new RBBB on EKG compared to prior; ordered for echo.  10/10: sleeping after overnight PRNs/not sleeping. Irritable when engaged, poor insight  10/11: labile, disorganized, impulsive, manic  10/12: flight of ideas, disorganized, manic, euphoric   10/13: flight of ideas, manic, remembers previous delusions and continues to talk about them   10/14: manic, remembers previous delusions, grandiose delusions, AAOx2 (disoriented to time), flight of ideas still present but improving   10/17: manic, 2 doses of Olanzapine PRN IM given over the weekend on 10/15/22 (6:28 and 22:48), continues to talk about previous grandiose delusions, disorientated to name and time, flight of ideas present but improving, mild to moderate pressured speech     PLAN:  -change to enhanced supervision for elopement risk, disorganization. Monitor behaviors  - COVID+--- monitor vitals.  -c/w Lithium 300mg tid-monitor bun/creat, sodium level, *AVOID NSAIDS  -c/w Melatonin 5mg hs  -c/w Seroquel to 400mg HS-hold if qtc >500  -c/w Anxiety/Mild Agitation PRN: Seroquel 25mg PO q6h prn  -c/w Severe agitation PRN: Zyprexa 5mg IM Q 6PRN  -c/w Severe Agitation: Haldol/Benadryl PRN as you are  -Monitor EKG qtc interval, HOLD antipsychotic if qtc >500  -CL to follow; dispo to inpatient psych when done with COVID isolation/bed opens. 2PC in chart  -Patient CANNOT leave AMA

## 2022-10-17 NOTE — PROGRESS NOTE ADULT - PROBLEM SELECTOR PLAN 7
DVT ppx: Lovenox  DISPO: COVID+ unit at [facility], patient is stable for transfer if bed is available

## 2022-10-17 NOTE — PROGRESS NOTE ADULT - ASSESSMENT
67M male with hx of bipolar 1 disorder/MDD, T2DM, BPH, presenting from [facility] w/agitation, intermittently refusing medication, found to incidentally have RBBB, now with nosocomial COVID.

## 2022-10-17 NOTE — PROGRESS NOTE ADULT - SUBJECTIVE AND OBJECTIVE BOX
Tammie Joshi MD  [facility] Division of Hospital Medicine  Pager [phone] (M-F 8AM-5PM)  Other Times: [phone]    Patient is a 67y old  Male who presents with a chief complaint of agitation (16 Oct 2022 10:50)    SUBJECTIVE / OVERNIGHT EVENTS: no acute events overnight, patient pleasant this morning, no complaints     MEDICATIONS  (STANDING):  aspirin enteric coated 81 milliGRAM(s) Oral daily  dextrose 5%. 1000 milliLiter(s) (50 mL/Hr) IV Continuous <Continuous>  dextrose 5%. 1000 milliLiter(s) (100 mL/Hr) IV Continuous <Continuous>  dextrose 50% Injectable 25 Gram(s) IV Push once  dextrose 50% Injectable 12.5 Gram(s) IV Push once  dextrose 50% Injectable 25 Gram(s) IV Push once  enoxaparin Injectable 40 milliGRAM(s) SubCutaneous every 24 hours  glucagon  Injectable 1 milliGRAM(s) IntraMuscular once  insulin glargine Injectable (LANTUS) 20 Unit(s) SubCutaneous at bedtime  insulin lispro (ADMELOG) corrective regimen sliding scale   SubCutaneous three times a day before meals  insulin lispro (ADMELOG) corrective regimen sliding scale   SubCutaneous at bedtime  lithium 300 milliGRAM(s) Oral three times a day  melatonin 3 milliGRAM(s) Oral at bedtime  QUEtiapine 400 milliGRAM(s) Oral at bedtime  sodium bicarbonate 650 milliGRAM(s) Oral two times a day  tamsulosin 0.4 milliGRAM(s) Oral at bedtime    MEDICATIONS  (PRN):  acetaminophen     Tablet .. 650 milliGRAM(s) Oral every 6 hours PRN Temp greater or equal to 38C (100.4F), Mild Pain (1 - 3), Moderate Pain (4 - 6)  bisacodyl 5 milliGRAM(s) Oral every 12 hours PRN Constipation  dextrose Oral Gel 15 Gram(s) Oral once PRN Blood Glucose LESS THAN 70 milliGRAM(s)/deciliter  OLANZapine Injectable 5 milliGRAM(s) IntraMuscular every 6 hours PRN Severe agitation  polyethylene glycol 3350 17 Gram(s) Oral daily PRN Constipation  QUEtiapine 25 milliGRAM(s) Oral every 6 hours PRN Anxiety/agitation      PHYSICAL EXAM:  Vital Signs Last 24 Hrs  T(C): 36.4 (17 Oct 2022 11:40), Max: 36.8 (16 Oct 2022 21:20)  T(F): 97.5 (17 Oct 2022 11:40), Max: 98.3 (16 Oct 2022 21:20)  HR: 74 (17 Oct 2022 11:40) (72 - 85)  BP: 109/70 (17 Oct 2022 11:40) (106/68 - 141/84)  RR: 17 (17 Oct 2022 11:40) (17 - 18)  SpO2: 100% (17 Oct 2022 11:40) (99% - 100%)    Parameters below as of 17 Oct 2022 11:40  Patient On (Oxygen Delivery Method): room air        CONSTITUTIONAL: NAD, well-developed, well-groomed  RESPIRATORY: Normal respiratory effort; lungs are clear to auscultation bilaterally  CARDIOVASCULAR: Regular rate and rhythm, normal S1 and S2, no murmur/rub/gallop; No lower extremity edema  GASTROINTESTINAL: Nontender to palpation, normoactive bowel sounds, no rebound/guarding; No hepatosplenomegaly  MUSCULOSKELETAL:  no clubbing or cyanosis of digits; no joint swelling or tenderness to palpation  NEUROLOGY: non-focal; no gross sensory deficits   PSYCH: A+O to person, place, and time; affect appropriate  SKIN: No rashes; warm     LABS:                      RADIOLOGY & ADDITIONAL TESTS:  Results Reviewed:   Imaging Personally Reviewed:  Electrocardiogram Personally Reviewed:    COORDINATION OF CARE:  Care Discussed with Consultants/Other Providers [Y/N]:  Prior or Outpatient Records Reviewed [Y/N]:

## 2022-10-17 NOTE — DIETITIAN INITIAL EVALUATION ADULT - PERTINENT MEDS FT
MEDICATIONS  (STANDING):  aspirin enteric coated 81 milliGRAM(s) Oral daily  dextrose 5%. 1000 milliLiter(s) (50 mL/Hr) IV Continuous <Continuous>  dextrose 5%. 1000 milliLiter(s) (100 mL/Hr) IV Continuous <Continuous>  dextrose 50% Injectable 25 Gram(s) IV Push once  dextrose 50% Injectable 12.5 Gram(s) IV Push once  dextrose 50% Injectable 25 Gram(s) IV Push once  enoxaparin Injectable 40 milliGRAM(s) SubCutaneous every 24 hours  glucagon  Injectable 1 milliGRAM(s) IntraMuscular once  insulin glargine Injectable (LANTUS) 20 Unit(s) SubCutaneous at bedtime  insulin lispro (ADMELOG) corrective regimen sliding scale   SubCutaneous three times a day before meals  insulin lispro (ADMELOG) corrective regimen sliding scale   SubCutaneous at bedtime  lithium 300 milliGRAM(s) Oral three times a day  melatonin 3 milliGRAM(s) Oral at bedtime  QUEtiapine 400 milliGRAM(s) Oral at bedtime  sodium bicarbonate 650 milliGRAM(s) Oral two times a day  tamsulosin 0.4 milliGRAM(s) Oral at bedtime    MEDICATIONS  (PRN):  acetaminophen     Tablet .. 650 milliGRAM(s) Oral every 6 hours PRN Temp greater or equal to 38C (100.4F), Mild Pain (1 - 3), Moderate Pain (4 - 6)  bisacodyl 5 milliGRAM(s) Oral every 12 hours PRN Constipation  dextrose Oral Gel 15 Gram(s) Oral once PRN Blood Glucose LESS THAN 70 milliGRAM(s)/deciliter  OLANZapine Injectable 5 milliGRAM(s) IntraMuscular every 6 hours PRN Severe agitation  polyethylene glycol 3350 17 Gram(s) Oral daily PRN Constipation  QUEtiapine 25 milliGRAM(s) Oral every 6 hours PRN Anxiety/agitation

## 2022-10-17 NOTE — DIETITIAN INITIAL EVALUATION ADULT - ADD RECOMMEND
1) Recommend continue with current diet, which remains appropriate at this time.   2) Continue provide assistance at meal time as needed.   3) Monitor PO intake, Labs, weights, BMs, and skin integrity.    4) RD to remain available for further nutritional interventions as indicated.

## 2022-10-17 NOTE — PROGRESS NOTE ADULT - PROBLEM SELECTOR PLAN 2
New in comparison to EKG from 2009  - patient is asymptomatic   - Chest pain free, no evidence of CHF, pro-, delta trop negative  - TTE reviewed and within normal limits   - No further inpatient work-up needed at this time, can follow-up with outpatient cards

## 2022-10-18 LAB
GLUCOSE BLDC GLUCOMTR-MCNC: 121 MG/DL — HIGH (ref 70–99)
GLUCOSE BLDC GLUCOMTR-MCNC: 131 MG/DL — HIGH (ref 70–99)
GLUCOSE BLDC GLUCOMTR-MCNC: 158 MG/DL — HIGH (ref 70–99)
GLUCOSE BLDC GLUCOMTR-MCNC: 253 MG/DL — HIGH (ref 70–99)

## 2022-10-18 PROCEDURE — 93010 ELECTROCARDIOGRAM REPORT: CPT

## 2022-10-18 PROCEDURE — 99233 SBSQ HOSP IP/OBS HIGH 50: CPT

## 2022-10-18 RX ORDER — QUETIAPINE FUMARATE 200 MG/1
450 TABLET, FILM COATED ORAL AT BEDTIME
Refills: 0 | Status: DISCONTINUED | OUTPATIENT
Start: 2022-10-18 | End: 2022-10-19

## 2022-10-18 RX ADMIN — QUETIAPINE FUMARATE 450 MILLIGRAM(S): 200 TABLET, FILM COATED ORAL at 21:35

## 2022-10-18 RX ADMIN — LITHIUM CARBONATE 300 MILLIGRAM(S): 300 TABLET, EXTENDED RELEASE ORAL at 05:29

## 2022-10-18 RX ADMIN — Medication 81 MILLIGRAM(S): at 15:13

## 2022-10-18 RX ADMIN — LITHIUM CARBONATE 300 MILLIGRAM(S): 300 TABLET, EXTENDED RELEASE ORAL at 15:13

## 2022-10-18 RX ADMIN — INSULIN GLARGINE 20 UNIT(S): 100 INJECTION, SOLUTION SUBCUTANEOUS at 21:29

## 2022-10-18 RX ADMIN — Medication 650 MILLIGRAM(S): at 05:29

## 2022-10-18 RX ADMIN — Medication 3: at 13:08

## 2022-10-18 RX ADMIN — Medication 3 MILLIGRAM(S): at 21:30

## 2022-10-18 RX ADMIN — LITHIUM CARBONATE 300 MILLIGRAM(S): 300 TABLET, EXTENDED RELEASE ORAL at 21:31

## 2022-10-18 RX ADMIN — Medication 650 MILLIGRAM(S): at 17:02

## 2022-10-18 RX ADMIN — ENOXAPARIN SODIUM 40 MILLIGRAM(S): 100 INJECTION SUBCUTANEOUS at 17:02

## 2022-10-18 RX ADMIN — TAMSULOSIN HYDROCHLORIDE 0.4 MILLIGRAM(S): 0.4 CAPSULE ORAL at 21:31

## 2022-10-18 NOTE — BH CONSULTATION LIAISON PROGRESS NOTE - NSBHFUPINTERVALHXFT_PSY_A_CORE
Chart reviewed. Pt is in isolation for being COVID positive. Standing medications of Lithium 300mg 3x/day and Seroquel 400mg HS taken since last evaluations. No PRNs given overnight.   On evaluation today, pt was still manic with grandiose hallucinations of being in the [employer] and [employer]. Pt endorses that he needs his brother to bring him shoes and pants so that when he leaves the hospital, he will be able to obtain a resume and return back to work at the [employer]. Continues to endorse going to [employer] in [place] and Grad school in [place]. Pt's speech was more interruptible today, less pressured, still delusional but more organized. Pt endorses good mood, states everyone in the hospital is treating him well, and that he feels safe. Endorses eating well and good sleep (6hours). Denies auditory or visual hallucinations. Orientated to place but not to name (called himself Pedrito Davalos) or time (endorses it's October 2024).

## 2022-10-18 NOTE — BH CONSULTATION LIAISON PROGRESS NOTE - NSBHCONSULTPRIMARYDISCUSSYES_PSY_A_CORE FT
Interval history on 10/17 notes that pt has not had a BM since 10/14. Discuss with primary care team on monitoring BM and urinary movements. Consider increasing Seroquel dose to 450mg-HS tomorrow once primary care team has monitored and informed CL of pt's BM and urinary movements.  Discussed with primary team about pt's BM and urinary movements. Primary team states that pt's BM (last BM 10/17/22) and urinary movements have been fine, states there is no constipation or urinary retention in the pt.

## 2022-10-18 NOTE — PROGRESS NOTE ADULT - PROBLEM SELECTOR PLAN 4
Home meds: Lithium 300mg TID, Seroquel 400mg qHS, Buproprion 100mg   - Continue Lithium 300mg TID, monitor BUN/Cr  - increased Seroquel 400mg qHS, Buproprion on hold    - c/w Anxiety/Mild Agitation PRN: Seroquel 25mg PO q6h prn  - c/w Severe agitation PRN: Zyprexa 5mg IM Q 6PRN   -c/w Severe Agitation: Haldol/Benadryl PRN  - check EKG to monitor QTc  - Psych following, recs appreciated

## 2022-10-18 NOTE — PROGRESS NOTE ADULT - PROBLEM SELECTOR PLAN 7
DVT ppx: Lovenox  DISPO: COVID+ unit at [facility], patient is stable for transfer if bed is available

## 2022-10-18 NOTE — PROGRESS NOTE ADULT - ASSESSMENT
67M male with hx of bipolar 1 disorder/MDD, T2DM, BPH, presenting from [facility] w/agitation, intermittently refusing medication, found to incidentally have RBBB, now with nosocomial COVID.

## 2022-10-18 NOTE — BH CONSULTATION LIAISON PROGRESS NOTE - NSBHASSESSMENTFT_PSY_ALL_CORE
68yo single, disabled, male currently residing at [facility]. PPHx Bipolar 1 Disorder, multiple inpatient admissions- last as few years ago at [place], multiple past suicide attempts, + history of aggressive behavior in the context of non compliance with medication. No legal issues or substance use issues. PMHx DM2 (insulin dependent), constipation, generalized muscle weakness, BPH. BIBA referred by SNF for agitation.    Patient presents to the ED in the context of agitation. Patient has been non compliant with medications, refusing ADLs and not caring for self. He is agitated in ED, psychotic and disorganized. He is acutely decompensated from baseline with poor insight and unpredictable behavior. He requires inpatient admission for safety and stabilization.    10/8: patient seen for follow up today, hyperverbal, illogical, disorganized, verbally dismissive and aggressive towards CL team, refusing to speak with providers, patient states "psychiatry is no good," staff report no violence or aggressive at present, 1:1 constant observation maintained.  10/9: pt seen for f/u, 1:1 at bedside. per staff, pt did not sleep at all overnight. pt uncooperative with interview, states he does not need psychiatry because he is not crazy. required prn Zyprexa 5mg IM x1 overnight. Serum lithium level 0.5 on arrival. Pending medical work up of new RBBB on EKG compared to prior; ordered for echo.  10/10: sleeping after overnight PRNs/not sleeping. Irritable when engaged, poor insight  10/11: labile, disorganized, impulsive, manic  10/12: flight of ideas, disorganized, manic, euphoric   10/13: flight of ideas, manic, remembers previous delusions and continues to talk about them   10/14: manic, remembers previous delusions, grandiose delusions, AAOx2 (disoriented to time), flight of ideas still present but improving   10/17: manic, 2 doses of Olanzapine PRN IM given over the weekend on 10/15/22 (6:28 and 22:48), continues to talk about previous grandiose delusions, disorientated to name and time, flight of ideas present but improving, mild to moderate pressured speech   10/18: manic, no PRNs given overnight, grandiose delusions, disoriented to name and time, pressured speech improving, more interruptible, flight of ideas still present but improving, less disorganized thoughts     PLAN:  -change to enhanced supervision for elopement risk, disorganization. Monitor behaviors  - COVID+--- monitor vitals.  -c/w Lithium 300mg tid-monitor bun/creat, sodium level, *AVOID NSAIDS  -c/w Melatonin 5mg hs  -c/w Seroquel to 400mg HS-hold if qtc >500  -c/w Anxiety/Mild Agitation PRN: Seroquel 25mg PO q6h prn  -c/w Severe agitation PRN: Zyprexa 5mg IM Q 6PRN  -c/w Severe Agitation: Haldol/Benadryl PRN as you are  -Monitor EKG qtc interval, HOLD antipsychotic if qtc >500  -CL to follow; dispo to inpatient psych when done with COVID isolation/bed opens. 2PC in chart  -Patient CANNOT leave AMA 68yo single, disabled, male currently residing at [facility]. PPHx Bipolar 1 Disorder, multiple inpatient admissions- last as few years ago at [place], multiple past suicide attempts, + history of aggressive behavior in the context of non compliance with medication. No legal issues or substance use issues. PMHx DM2 (insulin dependent), constipation, generalized muscle weakness, BPH. BIBA referred by SNF for agitation.    Patient presents to the ED in the context of agitation. Patient has been non compliant with medications, refusing ADLs and not caring for self. He is agitated in ED, psychotic and disorganized. He is acutely decompensated from baseline with poor insight and unpredictable behavior. He requires inpatient admission for safety and stabilization.    10/8: patient seen for follow up today, hyperverbal, illogical, disorganized, verbally dismissive and aggressive towards CL team, refusing to speak with providers, patient states "psychiatry is no good," staff report no violence or aggressive at present, 1:1 constant observation maintained.  10/9: pt seen for f/u, 1:1 at bedside. per staff, pt did not sleep at all overnight. pt uncooperative with interview, states he does not need psychiatry because he is not crazy. required prn Zyprexa 5mg IM x1 overnight. Serum lithium level 0.5 on arrival. Pending medical work up of new RBBB on EKG compared to prior; ordered for echo.  10/10: sleeping after overnight PRNs/not sleeping. Irritable when engaged, poor insight  10/11: labile, disorganized, impulsive, manic  10/12: flight of ideas, disorganized, manic, euphoric   10/13: flight of ideas, manic, remembers previous delusions and continues to talk about them   10/14: manic, remembers previous delusions, grandiose delusions, AAOx2 (disoriented to time), flight of ideas still present but improving   10/17: manic, 2 doses of Olanzapine PRN IM given over the weekend on 10/15/22 (6:28 and 22:48), continues to talk about previous grandiose delusions, disorientated to name and time, flight of ideas present but improving, mild to moderate pressured speech   10/18: manic, no PRNs given overnight, grandiose delusions, disoriented to name and time, pressured speech improving, more interruptible, flight of ideas still present but improving, less disorganized thoughts     PLAN:  -change to enhanced supervision for elopement risk, disorganization. Monitor behaviors  - COVID+--- monitor vitals.  -c/w Lithium 300mg tid-monitor bun/creat, sodium level, *AVOID NSAIDS  -c/w Melatonin 5mg hs  -INCREASE Seroquel to 450mg HS-hold if qtc >500  -c/w Anxiety/Mild Agitation PRN: Seroquel 25mg PO q6h prn  -c/w Severe agitation PRN: Zyprexa 5mg IM Q 6PRN  -c/w Severe Agitation: Haldol/Benadryl PRN as you are  -Monitor EKG qtc interval, HOLD antipsychotic if qtc >500  -CL to follow; dispo to inpatient psych when done with COVID isolation/bed opens. 2PC in chart  -Patient CANNOT leave AMA 66yo single, disabled, male currently residing at [facility]. PPHx Bipolar 1 Disorder, multiple inpatient admissions- last as few years ago at [place], multiple past suicide attempts, + history of aggressive behavior in the context of non compliance with medication. No legal issues or substance use issues. PMHx DM2 (insulin dependent), constipation, generalized muscle weakness, BPH. BIBA referred by SNF for agitation.    Patient presents to the ED in the context of agitation. Patient has been non compliant with medications, refusing ADLs and not caring for self. He is agitated in ED, psychotic and disorganized. He is acutely decompensated from baseline with poor insight and unpredictable behavior. He requires inpatient admission for safety and stabilization.    10/8: patient seen for follow up today, hyperverbal, illogical, disorganized, verbally dismissive and aggressive towards CL team, refusing to speak with providers, patient states "psychiatry is no good," staff report no violence or aggressive at present, 1:1 constant observation maintained.  10/9: pt seen for f/u, 1:1 at bedside. per staff, pt did not sleep at all overnight. pt uncooperative with interview, states he does not need psychiatry because he is not crazy. required prn Zyprexa 5mg IM x1 overnight. Serum lithium level 0.5 on arrival. Pending medical work up of new RBBB on EKG compared to prior; ordered for echo.  10/10: sleeping after overnight PRNs/not sleeping. Irritable when engaged, poor insight  10/11: labile, disorganized, impulsive, manic  10/12: flight of ideas, disorganized, manic, euphoric   10/13: flight of ideas, manic, remembers previous delusions and continues to talk about them   10/14: manic, remembers previous delusions, grandiose delusions, AAOx2 (disoriented to time), flight of ideas still present but improving   10/17: manic, 2 doses of Olanzapine PRN IM given over the weekend on 10/15/22 (6:28 and 22:48), continues to talk about previous grandiose delusions, disorientated to name and time, flight of ideas present but improving, mild to moderate pressured speech   10/18: manic, no PRNs given overnight, grandiose delusions, disoriented to name and time, pressured speech improving, more interruptible, flight of ideas still present but improving, less disorganized thoughts     PLAN:  -change to enhanced supervision for elopement risk, disorganization. Monitor behaviors  - COVID+--- monitor vitals.  -c/w Lithium 300mg tid-monitor bun/creat, sodium level, *AVOID NSAIDS  -c/w Melatonin 5mg hs  -INCREASE Seroquel to 450mg HS PO-hold if qtc >500. To target manic symptoms.   -c/w Anxiety/Mild Agitation PRN: Seroquel 25mg PO q6h prn  -c/w Severe agitation PRN: Zyprexa 5mg IM Q 6PRN  -c/w Severe Agitation: Haldol/Benadryl PRN as you are  -Monitor EKG qtc interval, HOLD antipsychotic if qtc >500  -CL to follow; dispo to inpatient psych when done with COVID isolation/bed opens. 2PC in chart  -Patient CANNOT leave AMA

## 2022-10-18 NOTE — BH CONSULTATION LIAISON PROGRESS NOTE - OTHER
Improved since last evaluation, more interruptible than before  Grandiose  Thoughts still disorganized but improved since last evaluation

## 2022-10-18 NOTE — PROGRESS NOTE ADULT - SUBJECTIVE AND OBJECTIVE BOX
Tammie Joshi MD  [facility] Division of Hospital Medicine  Pager [phone] (M-F 8AM-5PM)  Other Times: [id number]    Patient is a 67y old  Male who presents with a chief complaint of agitation (17 Oct 2022 13:16)    SUBJECTIVE / OVERNIGHT EVENTS: no acute events overnight    MEDICATIONS  (STANDING):  aspirin enteric coated 81 milliGRAM(s) Oral daily  dextrose 50% Injectable 25 Gram(s) IV Push once  enoxaparin Injectable 40 milliGRAM(s) SubCutaneous every 24 hours  insulin glargine Injectable (LANTUS) 20 Unit(s) SubCutaneous at bedtime  insulin lispro (ADMELOG) corrective regimen sliding scale   SubCutaneous three times a day before meals  insulin lispro (ADMELOG) corrective regimen sliding scale   SubCutaneous at bedtime  lithium 300 milliGRAM(s) Oral three times a day  melatonin 3 milliGRAM(s) Oral at bedtime  QUEtiapine 400 milliGRAM(s) Oral at bedtime  sodium bicarbonate 650 milliGRAM(s) Oral two times a day  tamsulosin 0.4 milliGRAM(s) Oral at bedtime    MEDICATIONS  (PRN):  acetaminophen     Tablet .. 650 milliGRAM(s) Oral every 6 hours PRN Temp greater or equal to 38C (100.4F), Mild Pain (1 - 3), Moderate Pain (4 - 6)  bisacodyl 5 milliGRAM(s) Oral every 12 hours PRN Constipation  OLANZapine Injectable 5 milliGRAM(s) IntraMuscular every 6 hours PRN Severe agitation  polyethylene glycol 3350 17 Gram(s) Oral daily PRN Constipation  QUEtiapine 25 milliGRAM(s) Oral every 6 hours PRN Anxiety/agitation      PHYSICAL EXAM:  Vital Signs Last 24 Hrs  T(C): 36.8 (18 Oct 2022 05:25), Max: 37.1 (17 Oct 2022 21:35)  T(F): 98.3 (18 Oct 2022 05:25), Max: 98.8 (17 Oct 2022 21:35)  HR: 77 (18 Oct 2022 05:25) (67 - 77)  BP: 104/60 (18 Oct 2022 05:25) (104/60 - 133/75)  RR: 17 (18 Oct 2022 05:25) (17 - 18)  SpO2: 98% (18 Oct 2022 05:25) (95% - 98%)    Parameters below as of 18 Oct 2022 05:25  Patient On (Oxygen Delivery Method): room air        CONSTITUTIONAL: NAD, well-developed, well-groomed  RESPIRATORY: Normal respiratory effort; lungs are clear to auscultation bilaterally  CARDIOVASCULAR: Regular rate and rhythm, normal S1 and S2, no murmur/rub/gallop; No lower extremity edema  GASTROINTESTINAL: Nontender to palpation, normoactive bowel sounds, no rebound/guarding; No hepatosplenomegaly  MUSCULOSKELETAL:  no clubbing or cyanosis of digits; no joint swelling or tenderness to palpation  NEUROLOGY: non-focal; no gross sensory deficits   PSYCH: A+O to person, place; affect appropriate  SKIN: No rashes; warm     LABS:                      RADIOLOGY & ADDITIONAL TESTS:  Results Reviewed:   Imaging Personally Reviewed:  Electrocardiogram Personally Reviewed:    COORDINATION OF CARE:  Care Discussed with Consultants/Other Providers [Y/N]:  Prior or Outpatient Records Reviewed [Y/N]:

## 2022-10-19 ENCOUNTER — TRANSCRIPTION ENCOUNTER (OUTPATIENT)
Age: 67
End: 2022-10-19

## 2022-10-19 ENCOUNTER — INPATIENT (INPATIENT)
Facility: HOSPITAL | Age: 67
LOS: 22 days | Discharge: NURSING, REHAB OR LONG-TERM CARE | End: 2022-11-11
Attending: PSYCHIATRY & NEUROLOGY | Admitting: PSYCHIATRY & NEUROLOGY

## 2022-10-19 VITALS — WEIGHT: 210.1 LBS | RESPIRATION RATE: 14 BRPM | HEIGHT: 67 IN | TEMPERATURE: 98 F

## 2022-10-19 VITALS
HEART RATE: 79 BPM | RESPIRATION RATE: 18 BRPM | OXYGEN SATURATION: 95 % | DIASTOLIC BLOOD PRESSURE: 72 MMHG | SYSTOLIC BLOOD PRESSURE: 113 MMHG | TEMPERATURE: 98 F

## 2022-10-19 DIAGNOSIS — F33.9 MAJOR DEPRESSIVE DISORDER, RECURRENT, UNSPECIFIED: ICD-10-CM

## 2022-10-19 DIAGNOSIS — U07.1 COVID-19: ICD-10-CM

## 2022-10-19 DIAGNOSIS — Z98.890 OTHER SPECIFIED POSTPROCEDURAL STATES: Chronic | ICD-10-CM

## 2022-10-19 LAB
GLUCOSE BLDC GLUCOMTR-MCNC: 121 MG/DL — HIGH (ref 70–99)
GLUCOSE BLDC GLUCOMTR-MCNC: 123 MG/DL — HIGH (ref 70–99)
SARS-COV-2 RNA SPEC QL NAA+PROBE: DETECTED

## 2022-10-19 PROCEDURE — 99233 SBSQ HOSP IP/OBS HIGH 50: CPT

## 2022-10-19 PROCEDURE — 99239 HOSP IP/OBS DSCHRG MGMT >30: CPT

## 2022-10-19 RX ORDER — LANOLIN ALCOHOL/MO/W.PET/CERES
3 CREAM (GRAM) TOPICAL AT BEDTIME
Refills: 0 | Status: DISCONTINUED | OUTPATIENT
Start: 2022-10-19 | End: 2022-11-11

## 2022-10-19 RX ORDER — INSULIN GLARGINE 100 [IU]/ML
25 INJECTION, SOLUTION SUBCUTANEOUS
Qty: 0 | Refills: 0 | DISCHARGE

## 2022-10-19 RX ORDER — QUETIAPINE FUMARATE 200 MG/1
25 TABLET, FILM COATED ORAL EVERY 6 HOURS
Refills: 0 | Status: DISCONTINUED | OUTPATIENT
Start: 2022-10-19 | End: 2022-11-11

## 2022-10-19 RX ORDER — LINACLOTIDE 145 UG/1
1 CAPSULE, GELATIN COATED ORAL
Qty: 0 | Refills: 0 | DISCHARGE

## 2022-10-19 RX ORDER — DEXTROSE 50 % IN WATER 50 %
15 SYRINGE (ML) INTRAVENOUS ONCE
Refills: 0 | Status: DISCONTINUED | OUTPATIENT
Start: 2022-10-19 | End: 2022-11-11

## 2022-10-19 RX ORDER — SODIUM BICARBONATE 1 MEQ/ML
1 SYRINGE (ML) INTRAVENOUS
Qty: 0 | Refills: 0 | DISCHARGE
Start: 2022-10-19

## 2022-10-19 RX ORDER — POLYETHYLENE GLYCOL 3350 17 G/17G
17 POWDER, FOR SOLUTION ORAL
Qty: 0 | Refills: 0 | DISCHARGE
Start: 2022-10-19

## 2022-10-19 RX ORDER — INSULIN LISPRO 100/ML
VIAL (ML) SUBCUTANEOUS AT BEDTIME
Refills: 0 | Status: DISCONTINUED | OUTPATIENT
Start: 2022-10-19 | End: 2022-11-11

## 2022-10-19 RX ORDER — SODIUM BICARBONATE 1 MEQ/ML
650 SYRINGE (ML) INTRAVENOUS
Refills: 0 | Status: DISCONTINUED | OUTPATIENT
Start: 2022-10-19 | End: 2022-11-11

## 2022-10-19 RX ORDER — LITHIUM CARBONATE 300 MG/1
3 TABLET, EXTENDED RELEASE ORAL
Qty: 0 | Refills: 0 | DISCHARGE
Start: 2022-10-19

## 2022-10-19 RX ORDER — LANOLIN ALCOHOL/MO/W.PET/CERES
1 CREAM (GRAM) TOPICAL
Qty: 0 | Refills: 0 | DISCHARGE
Start: 2022-10-19

## 2022-10-19 RX ORDER — DEXTROSE 50 % IN WATER 50 %
25 SYRINGE (ML) INTRAVENOUS ONCE
Refills: 0 | Status: DISCONTINUED | OUTPATIENT
Start: 2022-10-19 | End: 2022-11-11

## 2022-10-19 RX ORDER — SODIUM BICARBONATE 1 MEQ/ML
1 SYRINGE (ML) INTRAVENOUS
Qty: 0 | Refills: 0 | DISCHARGE

## 2022-10-19 RX ORDER — LITHIUM CARBONATE 300 MG/1
1 TABLET, EXTENDED RELEASE ORAL
Qty: 0 | Refills: 0 | DISCHARGE
Start: 2022-10-19

## 2022-10-19 RX ORDER — INSULIN LISPRO 100/ML
VIAL (ML) SUBCUTANEOUS
Refills: 0 | Status: DISCONTINUED | OUTPATIENT
Start: 2022-10-19 | End: 2022-11-11

## 2022-10-19 RX ORDER — LITHIUM CARBONATE 300 MG/1
1 TABLET, EXTENDED RELEASE ORAL
Qty: 0 | Refills: 0 | DISCHARGE

## 2022-10-19 RX ORDER — QUETIAPINE FUMARATE 200 MG/1
1 TABLET, FILM COATED ORAL
Qty: 0 | Refills: 0 | DISCHARGE

## 2022-10-19 RX ORDER — OLANZAPINE 15 MG/1
5 TABLET, FILM COATED ORAL ONCE
Refills: 0 | Status: DISCONTINUED | OUTPATIENT
Start: 2022-10-19 | End: 2022-11-11

## 2022-10-19 RX ORDER — ACETAMINOPHEN 500 MG
2 TABLET ORAL
Qty: 0 | Refills: 0 | DISCHARGE
Start: 2022-10-19

## 2022-10-19 RX ORDER — LITHIUM CARBONATE 300 MG/1
900 TABLET, EXTENDED RELEASE ORAL AT BEDTIME
Refills: 0 | Status: DISCONTINUED | OUTPATIENT
Start: 2022-10-20 | End: 2022-11-11

## 2022-10-19 RX ORDER — QUETIAPINE FUMARATE 200 MG/1
450 TABLET, FILM COATED ORAL AT BEDTIME
Refills: 0 | Status: DISCONTINUED | OUTPATIENT
Start: 2022-10-19 | End: 2022-10-31

## 2022-10-19 RX ORDER — INSULIN GLARGINE 100 [IU]/ML
20 INJECTION, SOLUTION SUBCUTANEOUS
Qty: 0 | Refills: 0 | DISCHARGE
Start: 2022-10-19

## 2022-10-19 RX ORDER — DEXTROSE 50 % IN WATER 50 %
12.5 SYRINGE (ML) INTRAVENOUS ONCE
Refills: 0 | Status: DISCONTINUED | OUTPATIENT
Start: 2022-10-19 | End: 2022-11-11

## 2022-10-19 RX ORDER — ASPIRIN/CALCIUM CARB/MAGNESIUM 324 MG
81 TABLET ORAL DAILY
Refills: 0 | Status: DISCONTINUED | OUTPATIENT
Start: 2022-10-19 | End: 2022-11-11

## 2022-10-19 RX ORDER — ACETAMINOPHEN 500 MG
650 TABLET ORAL EVERY 6 HOURS
Refills: 0 | Status: DISCONTINUED | OUTPATIENT
Start: 2022-10-19 | End: 2022-11-11

## 2022-10-19 RX ORDER — LANOLIN ALCOHOL/MO/W.PET/CERES
1 CREAM (GRAM) TOPICAL
Qty: 0 | Refills: 0 | DISCHARGE

## 2022-10-19 RX ORDER — QUETIAPINE FUMARATE 200 MG/1
9 TABLET, FILM COATED ORAL
Qty: 0 | Refills: 0 | DISCHARGE
Start: 2022-10-19

## 2022-10-19 RX ORDER — SODIUM CHLORIDE 9 MG/ML
1000 INJECTION, SOLUTION INTRAVENOUS
Refills: 0 | Status: DISCONTINUED | OUTPATIENT
Start: 2022-10-19 | End: 2022-11-11

## 2022-10-19 RX ORDER — INSULIN GLARGINE 100 [IU]/ML
20 INJECTION, SOLUTION SUBCUTANEOUS AT BEDTIME
Refills: 0 | Status: DISCONTINUED | OUTPATIENT
Start: 2022-10-19 | End: 2022-11-11

## 2022-10-19 RX ORDER — POLYETHYLENE GLYCOL 3350 17 G/17G
17 POWDER, FOR SOLUTION ORAL DAILY
Refills: 0 | Status: DISCONTINUED | OUTPATIENT
Start: 2022-10-19 | End: 2022-11-11

## 2022-10-19 RX ORDER — QUETIAPINE FUMARATE 200 MG/1
1 TABLET, FILM COATED ORAL
Qty: 0 | Refills: 0 | DISCHARGE
Start: 2022-10-19

## 2022-10-19 RX ORDER — ASPIRIN/CALCIUM CARB/MAGNESIUM 324 MG
1 TABLET ORAL
Qty: 0 | Refills: 0 | DISCHARGE
Start: 2022-10-19

## 2022-10-19 RX ORDER — BUPROPION HYDROCHLORIDE 150 MG/1
1 TABLET, EXTENDED RELEASE ORAL
Qty: 0 | Refills: 0 | DISCHARGE

## 2022-10-19 RX ORDER — MENTHOL AND METHYL SALICYLATE 10; 30 G/100G; G/100G
0 STICK TOPICAL
Qty: 0 | Refills: 0 | DISCHARGE

## 2022-10-19 RX ORDER — INSULIN GLARGINE 100 [IU]/ML
10 INJECTION, SOLUTION SUBCUTANEOUS AT BEDTIME
Refills: 0 | Status: DISCONTINUED | OUTPATIENT
Start: 2022-10-19 | End: 2022-10-19

## 2022-10-19 RX ORDER — GLUCAGON INJECTION, SOLUTION 0.5 MG/.1ML
1 INJECTION, SOLUTION SUBCUTANEOUS ONCE
Refills: 0 | Status: DISCONTINUED | OUTPATIENT
Start: 2022-10-19 | End: 2022-11-11

## 2022-10-19 RX ORDER — TAMSULOSIN HYDROCHLORIDE 0.4 MG/1
0.4 CAPSULE ORAL AT BEDTIME
Refills: 0 | Status: DISCONTINUED | OUTPATIENT
Start: 2022-10-19 | End: 2022-11-11

## 2022-10-19 RX ORDER — LITHIUM CARBONATE 300 MG/1
300 TABLET, EXTENDED RELEASE ORAL AT BEDTIME
Refills: 0 | Status: COMPLETED | OUTPATIENT
Start: 2022-10-19 | End: 2022-10-19

## 2022-10-19 RX ORDER — MAGNESIUM HYDROXIDE 400 MG/1
2400 TABLET, CHEWABLE ORAL
Qty: 0 | Refills: 0 | DISCHARGE

## 2022-10-19 RX ADMIN — Medication 81 MILLIGRAM(S): at 11:58

## 2022-10-19 RX ADMIN — QUETIAPINE FUMARATE 25 MILLIGRAM(S): 200 TABLET, FILM COATED ORAL at 13:35

## 2022-10-19 RX ADMIN — Medication 1: at 22:16

## 2022-10-19 RX ADMIN — QUETIAPINE FUMARATE 450 MILLIGRAM(S): 200 TABLET, FILM COATED ORAL at 20:33

## 2022-10-19 RX ADMIN — LITHIUM CARBONATE 300 MILLIGRAM(S): 300 TABLET, EXTENDED RELEASE ORAL at 05:30

## 2022-10-19 RX ADMIN — TAMSULOSIN HYDROCHLORIDE 0.4 MILLIGRAM(S): 0.4 CAPSULE ORAL at 20:33

## 2022-10-19 RX ADMIN — OLANZAPINE 5 MILLIGRAM(S): 15 TABLET, FILM COATED ORAL at 15:05

## 2022-10-19 RX ADMIN — LITHIUM CARBONATE 300 MILLIGRAM(S): 300 TABLET, EXTENDED RELEASE ORAL at 13:16

## 2022-10-19 RX ADMIN — Medication 650 MILLIGRAM(S): at 05:30

## 2022-10-19 RX ADMIN — Medication 650 MILLIGRAM(S): at 20:32

## 2022-10-19 RX ADMIN — LITHIUM CARBONATE 300 MILLIGRAM(S): 300 TABLET, EXTENDED RELEASE ORAL at 20:33

## 2022-10-19 RX ADMIN — Medication 3 MILLIGRAM(S): at 20:33

## 2022-10-19 RX ADMIN — INSULIN GLARGINE 20 UNIT(S): 100 INJECTION, SOLUTION SUBCUTANEOUS at 22:15

## 2022-10-19 NOTE — CHART NOTE - NSCHARTNOTEFT_GEN_A_CORE
Tolerating adequate PO: [x ] yes  [ ] no    PEG tube on bolus feeds: [ ] yes  [x ] no  --Must be on bolus feeds as TPN not accepted    Ambulates independently (can transfer to chair with assist): [ x] yes  [ ] no    Hospital bed required: [ ] yes  [x] no    Home O2: [ ] yes  [ x] no  --Only if patient was on home O2 prior to hospital admission    CPAP use at home confirmed with patient (accepted at [facility]): [ ] yes  [ x] no    BIPAP use at home (not accepted at [facility]): [ ] yes  [x ] no    Presence/Need of any IVs or PICCs (not accepted at [facility]): [ ] yes  [x ] no    Escobedo catheter: [ ] yes  [x ] no  --Include date of placement and TOV in discharge note: [ ] done    Wound care instructions included in discharge note: [ ] yes  [ ] no  --Send enough supplies for special wound care services until the next business day: [ ] done - N/A    Sutures/staples: [ ] yes  [x ] no    --Include expected removal date in discharge note: [ ] done    Presence of any implanted devices: [ ] yes  [x ] no  --Insulin pumps must be removed before transfer by endocrine team    Receiving chemotherapy: [ ] yes  [x ] no  --If yes, can it be delayed by [facility] admission?: [ ] yes  [ ] no    Date ranges for any subspecialist follow-ups needed:  --Include in discharge note: [x ] done

## 2022-10-19 NOTE — BH CONSULTATION LIAISON PROGRESS NOTE - NSBHFUPINTERVALCCFT_PSY_A_CORE
Patient states, "Psychiatry is no good, get out of here."
"leave me alone!"
"I love you!"
"I love you"
"I love you"
"I want to leave"   No PRNs given overnight   Vitals stable 
"I love you"   Vital signs stable  No PRNs overnight  Standing psychiatric meds (Lithium and Seroquel) taken 
f/u agitation, sent from NH
"I love you" 
Not applicable
"I love you"

## 2022-10-19 NOTE — BH INPATIENT PSYCHIATRY ASSESSMENT NOTE - RISK ASSESSMENT
modifiable risk factors- agitation, non compliance, psychosis    unmodifiable risk factors- history of suicide attempts, history of inpatient admissions, chronic mental illness    protective factors- nursing home, supportive family, no depression

## 2022-10-19 NOTE — BH CONSULTATION LIAISON PROGRESS NOTE - NSBHADMITCOUNSEL_PSY_A_CORE
risks and benefits of treatment options/instructions for management, treatment and follow up/risk factor reduction/client/family/caregiver education/other...

## 2022-10-19 NOTE — BH PATIENT PROFILE - NSDASAANGER_PSY_ALL_CORE
Department of Anesthesiology  Postprocedure Note    Patient: Josefina Miranda  MRN: [id number]  YOB: 1935  Date of evaluation: 8/28/2020  Time:  9:55 AM     Procedure Summary     Date:  08/28/20 Room / Location:  [address] [place] [address] / [facility]    Anesthesia Start:  5127 Anesthesia Stop:      Procedure:  LAPAROTOMY EXPLORATORY, POSS. RESECTION, ABSARA (N/A ) Diagnosis:  (.)    Surgeon:  Pawel Rangel MD Responsible Provider:  Newton Vences DO    Anesthesia Type:  general ASA Status:  4 - Emergent          Anesthesia Type: No value filed. Julio C Phase I:      Julio C Phase II:      Last vitals: Reviewed and per EMR flowsheets. Anesthesia Post Evaluation    Patient location during evaluation: ICU  Patient participation: complete - patient cannot participate  Level of consciousness: sedated and ventilated  Airway patency: Intubated. Nausea & Vomiting: no nausea and no vomiting  Complications: no  Cardiovascular status: Hemodynamics being supported with Levophed and vasopressin.   Respiratory status: intubated and ventilator  Hydration status: stable
No

## 2022-10-19 NOTE — BH INPATIENT PSYCHIATRY ASSESSMENT NOTE - NSBHASSESSSUMMFT_PSY_ALL_CORE
Patient is a 67 year man currently residing at [facility]. PPH Bipolar 1 Disorder. Hx of many inpatient admissions- last as few years ago at [place] per brother. Hx of multiple past suicide attempts- none recent. + history of aggressive behavior in the context of non compliance with medication. No legal issues or substance use issues. PMH- DM2 (insulin dependent), constipation, generalized muscle weakness, BPH. BIBA referred by SNF for agitation, admitted at [facility] initially and found to be Covid+ on 10/11, now admitted at [facility] for ongoing management of daryl.  Presents with grandiosity, elevated/irritable mood, FOI.     Plan:    Admit 9.27 status, no CO 1:1 given no SI despite h/o SAs  Routine checks  Continue with current regimen from [facility] CL:  lithium 300 milliGRAM(s) Oral at bedtime tonight, 900mg Lithobid qHS thereafter  melatonin. 3 milliGRAM(s) Oral at bedtime  QUEtiapine 450 milliGRAM(s) Oral at bedtime  Will consider additional mood stabilizer given persistence of daryl on current regimen.  medical medications: sodium bicarbonate 650 milliGRAM(s) Oral two times a day; tamsulosin 0.4 milliGRAM(s) Oral at bedtime; aspirin enteric coated 81 milliGRAM(s) Oral daily   Patient is a 67 year man currently residing at [facility]. PPH Bipolar 1 Disorder. Hx of many inpatient admissions- last as few years ago at [place] per brother. Hx of multiple past suicide attempts- none recent. + history of aggressive behavior in the context of non compliance with medication. No legal issues or substance use issues. PMH- DM2 (insulin dependent), constipation, generalized muscle weakness, BPH. BIBA referred by SNF for agitation, admitted at [facility] initially and found to be Covid+ on 10/11, now admitted at [facility] for ongoing management of daryl.  Presents with grandiosity, elevated/irritable mood, FOI.     Plan:    Admit 9.27 status, no CO 1:1 given no SI despite h/o SAs  Routine checks  Continue with current regimen from [facility] CL:  lithium 300 milliGRAM(s) Oral at bedtime tonight, 900mg Lithobid qHS thereafter  melatonin. 3 milliGRAM(s) Oral at bedtime  QUEtiapine 450 milliGRAM(s) Oral at bedtime  Will consider additional mood stabilizer given persistence of daryl on current regimen.  DM2: Lantus 20mg qHS; ISS  medical medications: sodium bicarbonate 650 milliGRAM(s) Oral two times a day; tamsulosin 0.4 milliGRAM(s) Oral at bedtime; aspirin enteric coated 81 milliGRAM(s) Oral daily  Group and milieu therapy

## 2022-10-19 NOTE — BH CONSULTATION LIAISON PROGRESS NOTE - NSBHMSETHTPROC_PSY_A_CORE
Disorganized/Flight of ideas/Illogical/Other
Disorganized/Flight of ideas/Illogical
Disorganized/Flight of ideas/Illogical/Other
Disorganized/Flight of ideas/Illogical/Other
Disorganized/Flight of ideas/Illogical
Disorganized
Disorganized/Flight of ideas/Illogical/Other
Unable to assess

## 2022-10-19 NOTE — BH PATIENT PROFILE - HOME MEDICATIONS
Lantus 100 units/mL subcutaneous solution , 20 unit(s) subcutaneous once a day (at bedtime)  sodium bicarbonate 650 mg oral tablet , 1 tab(s) orally 2 times a day  polyethylene glycol 3350 oral powder for reconstitution , 17 gram(s) orally once a day, As needed, Constipation  bisacodyl 5 mg oral delayed release tablet , 1 tab(s) orally every 12 hours, As needed, Constipation  melatonin 3 mg oral tablet , 1 tab(s) orally once a day (at bedtime)  QUEtiapine 50 mg oral tablet , 9 tab(s) orally once a day (at bedtime)  QUEtiapine 25 mg oral tablet , 1 tab(s) orally every 6 hours, As needed, Anxiety/agitation  aspirin 81 mg oral delayed release tablet , 1 tab(s) orally once a day  lithium 300 mg oral capsule , 1 cap(s) orally 3 times a day  acetaminophen 325 mg oral tablet , 2 tab(s) orally every 6 hours, As needed, Temp greater or equal to 38C (100.4F), Mild Pain (1 - 3), Moderate Pain (4 - 6)  Flomax 0.4 mg oral capsule , 1 cap(s) orally once a day

## 2022-10-19 NOTE — BH CONSULTATION LIAISON PROGRESS NOTE - NSBHPTASSESSDT_PSY_A_CORE
11-Oct-2022 13:31
10-Oct-2022 11:50
17-Oct-2022 13:04
08-Oct-2022 13:58
18-Oct-2022 09:50
14-Oct-2022 14:30
12-Oct-2022 13:41
13-Oct-2022 11:56
19-Oct-2022 09:41
09-Oct-2022 11:14

## 2022-10-19 NOTE — DISCHARGE NOTE PROVIDER - PROVIDER TOKENS
FREE:[LAST:[Primary care provider],PHONE:[(   )    -],FAX:[(   )    -],ADDRESS:[Please follow up with your primary care provider as needed]]

## 2022-10-19 NOTE — PROGRESS NOTE ADULT - PROBLEM SELECTOR PROBLEM 6
BPH (benign prostatic hyperplasia)
Need for prophylactic measure
BPH (benign prostatic hyperplasia)
BPH (benign prostatic hyperplasia)
Need for prophylactic measure

## 2022-10-19 NOTE — PROGRESS NOTE ADULT - PROBLEM SELECTOR PLAN 4
Home meds: Lithium 300mg TID, Seroquel 400mg qHS, Buproprion 100mg   - Continue Lithium 300mg TID, monitor BUN/Cr  - increased Seroquel 450mg qHS, Buproprion on hold    - c/w Anxiety/Mild Agitation PRN: Seroquel 25mg PO q6h prn  - c/w Severe agitation PRN: Zyprexa 5mg IM Q 6PRN   -c/w Severe Agitation: Haldol/Benadryl PRN  - QTc 474ms on 10/18  - Psych following, recs appreciated

## 2022-10-19 NOTE — PROGRESS NOTE ADULT - NSPROGADDITIONALINFOA_GEN_ALL_CORE
Discussed with brother Dr. Nikolai Kevin
Discussed with brother Dr. Nikolai Kevin
Dispo: will need inpt psych once TTE results are unremarkable
Discussed with brother Dr. Nikolai Kevin
ADDENDUM 3PM:   Patient COVID+ on PCR today, he is asymptomatic. Per infection control, patient needs to be on isolation for 11 days prior to being transferred to [facility]. I spoke to patient's brother Dr. Nikolai Kevin [phone] updated regarding COVID+ and need for isolation. Will check CXR. Patient is currently asymptomatic.

## 2022-10-19 NOTE — PROGRESS NOTE ADULT - PROBLEM SELECTOR PLAN 7
DVT ppx: Lovenox  DISPO: [facility]. Bed available today. 37 mins spent dc planning.

## 2022-10-19 NOTE — BH INPATIENT PSYCHIATRY ASSESSMENT NOTE - OTHER PAST PSYCHIATRIC HISTORY (INCLUDE DETAILS REGARDING ONSET, COURSE OF ILLNESS, INPATIENT/OUTPATIENT TREATMENT)
PPH Bipolar 1 Disorder. Hx of many inpatient admissions- last as few years ago at [place] per brother. Hx of multiple past suicide attempts- none recent.

## 2022-10-19 NOTE — BH CONSULTATION LIAISON PROGRESS NOTE - NSBHATTESTCOMMENTATTENDFT_PSY_A_CORE
Chart reviewed. Seen with trainee. Received PRN this AM. Still manic. Agree with above assessment/recs. Will continue to follow
Chart reviewed. Pt seen with trainee. Agree with assessment/recs. Grossly disorganized, manic. Will continue to follow
met with the patient this afternoon. Rather agitated, angry at MD. Believes that ' you are giving me new blood. I have [place] blood'. Interview had to be terminated as patient became rather agitated.   I coordinated with the patient's brother this morning-- all of the above plan was discussed. Questions answered.   Coordinated with tamara Lacey---- bed obtained at [facility]--- will plan on giving hand off.   
Chart reviewed. Pt seen with student. Grossly manic though a bit slowed compared to previous visits. Euphoric with intermittent irritability. See above assessment/recs. Will continue to follow on Monday 10/17; please call [phone] prior then if needed. Dispo is inpt psyc when able.
met with the patient this afternoon. Case discussed with [employer] impression and plan discussed and agreed upon. Patient was rather irritable this afternoon, states- ' who are you, why are you calling me with my Ukrainian name? I am not Ukrainian. I am american. I am Mr Roach to you'. States that he does not want to be disturbed, and continued to focus on his lunch. Very poor insight into his mental health needs. Compliant with meds for now.     diagnosis-- one unstable diagnosis, two stable diagnosis  risk: high--- (unstable mood, lability, can be impulsive, needs inpatient psych admission)
met with the patient. Case discussed with PA-S, impression and plan discussed and agreed upon. Patient was rather euphoric, less disorganized, but still talkative, and mildly pressured. Grandiose delusions still present.   One unstable diagnosis, two stable diagnosis  risk-- high (manic, impulsive, requires inpatient psych admission)    Continue plan as above.

## 2022-10-19 NOTE — BH PATIENT PROFILE - FALL HARM RISK - HARM RISK INTERVENTIONS
Assistance with ambulation/Assistance OOB with selected safe patient handling equipment/Communicate Risk of Fall with Harm to all staff/Discuss with provider need for PT consult/Monitor for mental status changes/Monitor gait and stability/Move patient closer to nurses' station/Orthostatic vital signs/Reinforce activity limits and safety measures with patient and family/Reorient to person, place and time as needed/Review medications for side effects contributing to fall risk/Sit up slowly, dangle for a short time, stand at bedside before walking/Tailored Fall Risk Interventions/Toileting schedule using arm’s reach rule for commode and bathroom/Visual Cue: Yellow wristband and red socks/Instruct patient to call for assistance before getting out of bed or chair/Non-slip footwear when patient is out of bed/Physically safe environment - no spills, clutter or unnecessary equipment/Purposeful Proactive Rounding/Room/bathroom lighting operational, light cord in reach

## 2022-10-19 NOTE — BH CONSULTATION LIAISON PROGRESS NOTE - NSBHFUPINTERVALHXFT_PSY_A_CORE
Chart reviewed. Pt is in isolation due to being COVID positive. Pt received standing psychiatric medications of Lithium and Seroquel overnight. Pt did not receive any PRNs overnight.   On exam today, pt was more irritable than usual. Exam was started during breakfast time, pt had PA-S leave so he can finish his breakfast first. Pt was insistent someone call his brother so he can bring him clothes and get him out of this "mental hospital". Pt endorses that he has no "mental problems" and he wants to leave today. Endorses mood being fine, states everything is good, states he's eating well and sleeping well, denies any auditory or visual hallucinations. 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 Pt endorses feeling unsafe due to his roommate and wants to "get away from this bacteria", but denies feeling like anyone is trying to hurt him in the hospital. Pt was oriented to location, disoriented to time (states it's 2024), and when asked for name, pt gave two, his given name, and Pedrito Davalos, which he states is the name on his [place] passport. Endorses that he has a girlfriend back in [place] and he wants to go back to have a better life. When told he was in the hospital for COVID infection, pt denies having COVID. Throughout exam, pt was insistent on calling his brother so he could leave.   Brother called, baseline established. As per brother, pt usually goes by Pedrito Davalos and believes he works at the [employer]. Pt has impulsivity in spending and is generally a pleasant, happy pt.  Chart reviewed. Pt is in isolation due to being COVID positive. Pt received standing psychiatric medications of Lithium and Seroquel overnight. Pt did not receive any PRNs overnight.   On exam today, pt was more irritable than usual. Exam was started during breakfast time, pt had PA-S leave so he can finish his breakfast first. Pt was insistent someone call his brother so he can bring him clothes and get him out of this "mental hospital". Pt endorses that he has no "mental problems" and he wants to leave today. Endorses mood being fine, states everything is good, states he's eating well and sleeping well, denies any auditory or visual hallucinations. Continues to have grandiose delusions of being a , denies being in the [employer] anymore as that was his "volunteer work". Pt endorses feeling unsafe due to his roommate and wants to "get away from this bacteria", but denies feeling like anyone is trying to hurt him in the hospital. Pt was oriented to location, disoriented to time (states it's 2024), and when asked for name, pt gave two, his given name, and Pedrito Davalos, which he states is the name on his [place] passport. Endorses that he has a girlfriend back in [place] and he wants to go back to have a better life. When told he was in the hospital for COVID infection, pt denies having COVID. Throughout exam, pt was insistent on calling his brother so he could leave.   Brother called, baseline established. As per brother, pt usually goes by 'Pedrito Davalos' and believes he is working for the '[employer]'. States that at baseline patient is not irritable or aggressive but rather pleasant and jovial. Discussed today's presentation and in agreement that patient is still not back to baseline. Usually decompensation is still in context to noncompliance, and it is suspected that patient had been noncompliant with Li at the NH.

## 2022-10-19 NOTE — BH CONSULTATION LIAISON PROGRESS NOTE - NSBHATTESTBILLINGAW_PSY_A_CORE
[id number]-Rsbnaiwvpv Inpatient care - high complexity - 35 minutes
[id number]-Dmyuyyqgib Inpatient care - high complexity - 35 minutes
[id number]-Bzfpzemsza Inpatient care - high complexity - 35 minutes
[id number]-Fdwksgqckp Inpatient care - high complexity - 35 minutes
[id number]-Rwexkhopmj Inpatient care - high complexity - 35 minutes
[id number]-Zyqewaefeu Inpatient care - high complexity - 35 minutes
[id number]-Nevbvzzotr Inpatient care - high complexity - 35 minutes
[id number]-Amksijrvcx Inpatient care - high complexity - 35 minutes
[id number]-Zbzbhdxrtk Inpatient care - moderate complexity - 25 minutes
Non-billable

## 2022-10-19 NOTE — BH CONSULTATION LIAISON PROGRESS NOTE - NSBHASSESSMENTFT_PSY_ALL_CORE
68yo single, disabled, male currently residing at [facility]. PPHx Bipolar 1 Disorder, multiple inpatient admissions- last as few years ago at [place], multiple past suicide attempts, + history of aggressive behavior in the context of non compliance with medication. No legal issues or substance use issues. PMHx DM2 (insulin dependent), constipation, generalized muscle weakness, BPH. BIBA referred by SNF for agitation.    Patient presents to the ED in the context of agitation. Patient has been non compliant with medications, refusing ADLs and not caring for self. He is agitated in ED, psychotic and disorganized. He is acutely decompensated from baseline with poor insight and unpredictable behavior. He requires inpatient admission for safety and stabilization.    10/8: patient seen for follow up today, hyperverbal, illogical, disorganized, verbally dismissive and aggressive towards CL team, refusing to speak with providers, patient states "psychiatry is no good," staff report no violence or aggressive at present, 1:1 constant observation maintained.  10/9: pt seen for f/u, 1:1 at bedside. per staff, pt did not sleep at all overnight. pt uncooperative with interview, states he does not need psychiatry because he is not crazy. required prn Zyprexa 5mg IM x1 overnight. Serum lithium level 0.5 on arrival. Pending medical work up of new RBBB on EKG compared to prior; ordered for echo.  10/10: sleeping after overnight PRNs/not sleeping. Irritable when engaged, poor insight  10/11: labile, disorganized, impulsive, manic  10/12: flight of ideas, disorganized, manic, euphoric   10/13: flight of ideas, manic, remembers previous delusions and continues to talk about them   10/14: manic, remembers previous delusions, grandiose delusions, AAOx2 (disoriented to time), flight of ideas still present but improving   10/17: manic, 2 doses of Olanzapine PRN IM given over the weekend on 10/15/22 (6:28 and 22:48), continues to talk about previous grandiose delusions, disorientated to name and time, flight of ideas present but improving, mild to moderate pressured speech   10/18: manic, no PRNs given overnight, grandiose delusions, disoriented to name and time, pressured speech improving, more interruptible, flight of ideas still present but improving, less disorganized thoughts   10/19: no PRNs given overnight, irritable but states "everything is good", grandiose delusions, disoriented to time, when asked for name, gave 2 (Harshad and Pedrito), pressured speech improving, more interruptible, poor insight (states he has "no mental problems" and does not believe he has COVID), insistent on leaving hospital. Baseline established with brother.     PLAN:  -c/w enhanced supervision for elopement risk, disorganization. Monitor behaviors  - COVID+--- monitor vitals.  -c/w Lithium 300mg tid-monitor bun/creat, sodium level, *AVOID NSAIDS  -c/w Melatonin 5mg hs  -c/w Seroquel to 450mg HS PO-hold if qtc >500. To target manic symptoms.   -c/w Anxiety/Mild Agitation PRN: Seroquel 25mg PO q6h prn  -c/w Severe agitation PRN: Zyprexa 5mg IM Q 6PRN  -c/w Severe Agitation: Haldol/Benadryl PRN as you are  -Monitor EKG qtc interval, HOLD antipsychotic if qtc >500  -CL to follow; dispo to inpatient psych when done with COVID isolation/bed opens. 2PC in chart  -Will continue to keep in touch with brother, baseline established, consider dispo to SNF once pt is stable at baseline   -Patient CANNOT leave AMA 68yo single, disabled, male currently residing at [facility]. PPHx Bipolar 1 Disorder, multiple inpatient admissions- last as few years ago at [place], multiple past suicide attempts, + history of aggressive behavior in the context of non compliance with medication. No legal issues or substance use issues. PMHx DM2 (insulin dependent), constipation, generalized muscle weakness, BPH. BIBA referred by SNF for agitation.    Patient presents to the ED in the context of agitation. Patient has been non compliant with medications, refusing ADLs and not caring for self. He is agitated in ED, psychotic and disorganized. He is acutely decompensated from baseline with poor insight and unpredictable behavior. He requires inpatient admission for safety and stabilization.    10/8: patient seen for follow up today, hyperverbal, illogical, disorganized, verbally dismissive and aggressive towards CL team, refusing to speak with providers, patient states "psychiatry is no good," staff report no violence or aggressive at present, 1:1 constant observation maintained.  10/9: pt seen for f/u, 1:1 at bedside. per staff, pt did not sleep at all overnight. pt uncooperative with interview, states he does not need psychiatry because he is not crazy. required prn Zyprexa 5mg IM x1 overnight. Serum lithium level 0.5 on arrival. Pending medical work up of new RBBB on EKG compared to prior; ordered for echo.  10/10: sleeping after overnight PRNs/not sleeping. Irritable when engaged, poor insight  10/11: labile, disorganized, impulsive, manic  10/12: flight of ideas, disorganized, manic, euphoric   10/13: flight of ideas, manic, remembers previous delusions and continues to talk about them   10/14: manic, remembers previous delusions, grandiose delusions, AAOx2 (disoriented to time), flight of ideas still present but improving   10/17: manic, 2 doses of Olanzapine PRN IM given over the weekend on 10/15/22 (6:28 and 22:48), continues to talk about previous grandiose delusions, disorientated to name and time, flight of ideas present but improving, mild to moderate pressured speech   10/18: manic, no PRNs given overnight, grandiose delusions, disoriented to name and time, pressured speech improving, more interruptible, flight of ideas still present but improving, less disorganized thoughts   10/19: no PRNs given overnight, irritable but states "everything is good", grandiose delusions, disoriented to time, when asked for name, gave 2 (Harshad and Pedrito), pressured speech improving, more interruptible, poor insight (states he has "no mental problems" and does not believe he has COVID), insistent on leaving hospital.     PLAN:  -c/w enhanced supervision for elopement risk, disorganization. Monitor behaviors  - COVID+--- monitor vitals.  -c/w Lithium 300mg tid-monitor bun/creat, sodium level, *AVOID NSAIDS  -c/w Melatonin 5mg hs  -c/w Seroquel 450mg HS PO-hold if qtc >500. To target manic symptoms.   -c/w Anxiety/Mild Agitation PRN: Seroquel 25mg PO q6h prn  -c/w Severe agitation PRN: Zyprexa 5mg IM Q 6PRN  -c/w Severe Agitation: Haldol/Benadryl PRN as you are  -Monitor EKG qtc interval, HOLD antipsychotic if qtc >500  -CL to follow; dispo to inpatient psych when done with COVID isolation/bed opens. 2PC in chart  -Will continue to keep in touch with brother, baseline established, consider dispo to SNF once pt is stable at baseline   -Patient CANNOT leave AMA

## 2022-10-19 NOTE — BH CONSULTATION LIAISON PROGRESS NOTE - NSBHCHARTREVIEWVS_PSY_A_CORE FT
Vital Signs Last 24 Hrs  T(C): 36.4 (17 Oct 2022 11:40), Max: 36.8 (16 Oct 2022 21:20)  T(F): 97.5 (17 Oct 2022 11:40), Max: 98.3 (16 Oct 2022 21:20)  HR: 74 (17 Oct 2022 11:40) (72 - 85)  BP: 109/70 (17 Oct 2022 11:40) (106/68 - 141/84)  BP(mean): --  RR: 17 (17 Oct 2022 11:40) (17 - 18)  SpO2: 100% (17 Oct 2022 11:40) (99% - 100%)    Parameters below as of 17 Oct 2022 11:40  Patient On (Oxygen Delivery Method): room air    
Vital Signs Last 24 Hrs  T(C): 36.9 (10 Oct 2022 04:00), Max: 36.9 (10 Oct 2022 04:00)  T(F): 98.4 (10 Oct 2022 04:00), Max: 98.4 (10 Oct 2022 04:00)  HR: 81 (10 Oct 2022 04:00) (81 - 85)  BP: 139/89 (10 Oct 2022 04:00) (139/89 - 145/91)  BP(mean): --  RR: 17 (10 Oct 2022 04:00) (17 - 19)  SpO2: 99% (10 Oct 2022 04:00) (99% - 100%)    Parameters below as of 10 Oct 2022 04:00  Patient On (Oxygen Delivery Method): room air    
Vital Signs Last 24 Hrs  T(C): 36.7 (19 Oct 2022 05:30), Max: 36.8 (18 Oct 2022 13:57)  T(F): 98.1 (19 Oct 2022 05:30), Max: 98.3 (18 Oct 2022 21:30)  HR: 79 (19 Oct 2022 07:53) (76 - 88)  BP: 113/72 (19 Oct 2022 07:53) (100/60 - 150/88)  BP(mean): --  RR: 18 (19 Oct 2022 07:53) (17 - 18)  SpO2: 95% (19 Oct 2022 07:53) (95% - 100%)    Parameters below as of 19 Oct 2022 07:53  Patient On (Oxygen Delivery Method): room air    
Vital Signs Last 24 Hrs  T(C): 36.7 (08 Oct 2022 10:12), Max: 37 (07 Oct 2022 21:02)  T(F): 98.1 (08 Oct 2022 10:12), Max: 98.6 (07 Oct 2022 21:02)  HR: 74 (08 Oct 2022 10:12) (73 - 81)  BP: 113/72 (08 Oct 2022 10:12) (113/72 - 142/74)  BP(mean): --  RR: 17 (08 Oct 2022 10:12) (17 - 19)  SpO2: 100% (08 Oct 2022 10:12) (96% - 100%)    Parameters below as of 08 Oct 2022 10:12  Patient On (Oxygen Delivery Method): room air    
Vital Signs Last 24 Hrs  T(C): 36.6 (11 Oct 2022 12:02), Max: 37.1 (10 Oct 2022 21:19)  T(F): 97.9 (11 Oct 2022 12:02), Max: 98.7 (10 Oct 2022 21:19)  HR: 77 (11 Oct 2022 12:02) (66 - 86)  BP: 137/69 (11 Oct 2022 12:02) (110/87 - 137/69)  BP(mean): --  RR: 18 (11 Oct 2022 12:02) (17 - 18)  SpO2: 100% (11 Oct 2022 12:02) (99% - 100%)    Parameters below as of 11 Oct 2022 12:02  Patient On (Oxygen Delivery Method): room air    
Vital Signs Last 24 Hrs  T(C): 36.8 (09 Oct 2022 08:02), Max: 36.8 (09 Oct 2022 08:02)  T(F): 98.2 (09 Oct 2022 08:02), Max: 98.2 (09 Oct 2022 08:02)  HR: 69 (09 Oct 2022 08:02) (69 - 79)  BP: 123/76 (09 Oct 2022 08:02) (123/76 - 140/81)  BP(mean): --  RR: 18 (09 Oct 2022 08:02) (17 - 18)  SpO2: 100% (09 Oct 2022 08:02) (100% - 100%)    Parameters below as of 09 Oct 2022 08:02  Patient On (Oxygen Delivery Method): room air    
Vital Signs Last 24 Hrs  T(C): 36.7 (13 Oct 2022 05:58), Max: 37.1 (12 Oct 2022 16:43)  T(F): 98.1 (13 Oct 2022 05:58), Max: 98.7 (12 Oct 2022 16:43)  HR: 84 (13 Oct 2022 05:58) (81 - 84)  BP: 143/86 (13 Oct 2022 05:58) (136/81 - 143/87)  BP(mean): --  RR: 16 (13 Oct 2022 05:58) (16 - 17)  SpO2: 97% (13 Oct 2022 05:58) (97% - 97%)    Parameters below as of 13 Oct 2022 05:58  Patient On (Oxygen Delivery Method): room air    
Vital Signs Last 24 Hrs  T(C): 36.4 (14 Oct 2022 11:18), Max: 37.1 (13 Oct 2022 21:37)  T(F): 97.5 (14 Oct 2022 11:18), Max: 98.7 (13 Oct 2022 21:37)  HR: 79 (14 Oct 2022 11:18) (79 - 87)  BP: 117/79 (14 Oct 2022 11:18) (117/79 - 150/94)  BP(mean): --  RR: 18 (14 Oct 2022 11:18) (17 - 19)  SpO2: 97% (14 Oct 2022 11:18) (97% - 100%)    Parameters below as of 14 Oct 2022 11:18  Patient On (Oxygen Delivery Method): room air    
Vital Signs Last 24 Hrs  T(C): 37.7 (12 Oct 2022 09:06), Max: 38.2 (12 Oct 2022 07:58)  T(F): 99.9 (12 Oct 2022 09:06), Max: 100.7 (12 Oct 2022 07:58)  HR: 84 (12 Oct 2022 09:06) (80 - 99)  BP: 130/74 (12 Oct 2022 07:58) (130/74 - 144/90)  BP(mean): --  RR: 17 (12 Oct 2022 09:06) (16 - 17)  SpO2: 95% (12 Oct 2022 09:06) (95% - 97%)    Parameters below as of 12 Oct 2022 09:06  Patient On (Oxygen Delivery Method): room air    
Vital Signs Last 24 Hrs  T(C): 36.8 (18 Oct 2022 05:25), Max: 37.1 (17 Oct 2022 21:35)  T(F): 98.3 (18 Oct 2022 05:25), Max: 98.8 (17 Oct 2022 21:35)  HR: 77 (18 Oct 2022 05:25) (67 - 77)  BP: 104/60 (18 Oct 2022 05:25) (104/60 - 133/75)  BP(mean): --  RR: 17 (18 Oct 2022 05:25) (17 - 18)  SpO2: 98% (18 Oct 2022 05:25) (95% - 100%)    Parameters below as of 18 Oct 2022 05:25  Patient On (Oxygen Delivery Method): room air

## 2022-10-19 NOTE — DISCHARGE NOTE PROVIDER - NSDCMRMEDTOKEN_GEN_ALL_CORE_FT
acetaminophen 325 mg oral tablet: 2 tab(s) orally every 6 hours, As needed, Temp greater or equal to 38C (100.4F), Mild Pain (1 - 3), Moderate Pain (4 - 6)  aspirin 81 mg oral delayed release tablet: 1 tab(s) orally once a day  bisacodyl 5 mg oral delayed release tablet: 1 tab(s) orally every 12 hours, As needed, Constipation  Flomax 0.4 mg oral capsule: 1 cap(s) orally once a day  insulin glargine 100 units/mL subcutaneous solution: 25  subcutaneous once a day (at bedtime)  lithium 300 mg oral capsule: 1 cap(s) orally 3 times a day  melatonin 3 mg oral tablet: 1 tab(s) orally once a day (at bedtime)  polyethylene glycol 3350 oral powder for reconstitution: 17 gram(s) orally once a day, As needed, Constipation  QUEtiapine 25 mg oral tablet: 1 tab(s) orally every 6 hours, As needed, Anxiety/agitation  QUEtiapine 50 mg oral tablet: 9 tab(s) orally once a day (at bedtime)  sodium bicarbonate 650 mg oral tablet: 1 tab(s) orally 2 times a day   acetaminophen 325 mg oral tablet: 2 tab(s) orally every 6 hours, As needed, Temp greater or equal to 38C (100.4F), Mild Pain (1 - 3), Moderate Pain (4 - 6)  aspirin 81 mg oral delayed release tablet: 1 tab(s) orally once a day  bisacodyl 5 mg oral delayed release tablet: 1 tab(s) orally every 12 hours, As needed, Constipation  Flomax 0.4 mg oral capsule: 1 cap(s) orally once a day  Lantus 100 units/mL subcutaneous solution: 20 unit(s) subcutaneous once a day (at bedtime)  lithium 300 mg oral capsule: 1 cap(s) orally 3 times a day  melatonin 3 mg oral tablet: 1 tab(s) orally once a day (at bedtime)  polyethylene glycol 3350 oral powder for reconstitution: 17 gram(s) orally once a day, As needed, Constipation  QUEtiapine 25 mg oral tablet: 1 tab(s) orally every 6 hours, As needed, Anxiety/agitation  QUEtiapine 50 mg oral tablet: 9 tab(s) orally once a day (at bedtime)  sodium bicarbonate 650 mg oral tablet: 1 tab(s) orally 2 times a day

## 2022-10-19 NOTE — BH CONSULTATION LIAISON PROGRESS NOTE - NSBHMSEPERCEPT_PSY_A_CORE
Unable to assess
No abnormalities

## 2022-10-19 NOTE — BH CONSULTATION LIAISON PROGRESS NOTE - NSBHCONSULTMEDPRN_PSY_A_CORE
Assume bedside report and care at 0700. Patient alert and oriented x 4. Patient call light within reach, bed locked and lower position, be/ chair alarm on. Patient has no pain, no nausea and vomiting, no numbness and tingling. Medications given per MAR. Ambulates SBA. SCD's on.  Vital signs stable, no signs and symptom of infection noted on assessment. Surgical site, clean dry and intact. Plan of care discussed with  services via Ipad. All questions answered at this time.   
yes

## 2022-10-19 NOTE — BH CONSULTATION LIAISON PROGRESS NOTE - NSBHCONSULTMEDPRNREASON_PSY_A_CORE
agitation.../severe agitation...
agitation...
agitation.../severe agitation...
agitation...

## 2022-10-19 NOTE — BH CONSULTATION LIAISON PROGRESS NOTE - NSBHMSEBODY_PSY_A_CORE
Average build
Overweight
Average build

## 2022-10-19 NOTE — PROGRESS NOTE ADULT - SUBJECTIVE AND OBJECTIVE BOX
Tammie Joshi MD  [facility] Division of Hospital Medicine  Pager [phone] (M-F 8AM-5PM)  Other Times: [phone]    Patient is a 67y old  Male who presents with a chief complaint of agitation (19 Oct 2022 10:53)    SUBJECTIVE / OVERNIGHT EVENTS: no acute events overnight    MEDICATIONS  (STANDING):  aspirin enteric coated 81 milliGRAM(s) Oral daily  dextrose 50% Injectable 25 Gram(s) IV Push once  enoxaparin Injectable 40 milliGRAM(s) SubCutaneous every 24 hours  insulin glargine Injectable (LANTUS) 20 Unit(s) SubCutaneous at bedtime  insulin lispro (ADMELOG) corrective regimen sliding scale   SubCutaneous three times a day before meals  insulin lispro (ADMELOG) corrective regimen sliding scale   SubCutaneous at bedtime  lithium 300 milliGRAM(s) Oral three times a day  melatonin 3 milliGRAM(s) Oral at bedtime  QUEtiapine 450 milliGRAM(s) Oral at bedtime  sodium bicarbonate 650 milliGRAM(s) Oral two times a day  tamsulosin 0.4 milliGRAM(s) Oral at bedtime    MEDICATIONS  (PRN):  acetaminophen     Tablet .. 650 milliGRAM(s) Oral every 6 hours PRN Temp greater or equal to 38C (100.4F), Mild Pain (1 - 3), Moderate Pain (4 - 6)  bisacodyl 5 milliGRAM(s) Oral every 12 hours PRN Constipation  OLANZapine Injectable 5 milliGRAM(s) IntraMuscular every 6 hours PRN Severe agitation  polyethylene glycol 3350 17 Gram(s) Oral daily PRN Constipation  QUEtiapine 25 milliGRAM(s) Oral every 6 hours PRN Anxiety/agitation      PHYSICAL EXAM:  Vital Signs Last 24 Hrs  T(C): 36.7 (19 Oct 2022 05:30), Max: 36.8 (18 Oct 2022 13:57)  T(F): 98.1 (19 Oct 2022 05:30), Max: 98.3 (18 Oct 2022 21:30)  HR: 79 (19 Oct 2022 07:53) (76 - 88)  BP: 113/72 (19 Oct 2022 07:53) (100/60 - 150/88)  RR: 18 (19 Oct 2022 07:53) (17 - 18)  SpO2: 95% (19 Oct 2022 07:53) (95% - 100%)    Parameters below as of 19 Oct 2022 07:53  Patient On (Oxygen Delivery Method): room air        CONSTITUTIONAL: NAD, well-developed, well-groomed  RESPIRATORY: Normal respiratory effort; lungs are clear to auscultation bilaterally  CARDIOVASCULAR: Regular rate and rhythm, normal S1 and S2, no murmur/rub/gallop; No lower extremity edema  GASTROINTESTINAL: Nontender to palpation, normoactive bowel sounds, no rebound/guarding; No hepatosplenomegaly  MUSCULOSKELETAL:  no clubbing or cyanosis of digits; no joint swelling or tenderness to palpation  NEUROLOGY: non-focal; no gross sensory deficits   PSYCH: A+O to person, place, and time; affect appropriate  SKIN: No rashes; warm     LABS:                      RADIOLOGY & ADDITIONAL TESTS:  Results Reviewed:   Imaging Personally Reviewed:  Electrocardiogram Personally Reviewed:    COORDINATION OF CARE:  Care Discussed with Consultants/Other Providers [Y/N]:  Prior or Outpatient Records Reviewed [Y/N]:

## 2022-10-19 NOTE — BH INPATIENT PSYCHIATRY ASSESSMENT NOTE - CURRENT MEDICATION
MEDICATIONS  (STANDING):  aspirin enteric coated 81 milliGRAM(s) Oral daily  dextrose 5%. 1000 milliLiter(s) (50 mL/Hr) IV Continuous <Continuous>  dextrose 5%. 1000 milliLiter(s) (100 mL/Hr) IV Continuous <Continuous>  dextrose 50% Injectable 25 Gram(s) IV Push once  dextrose 50% Injectable 12.5 Gram(s) IV Push once  dextrose 50% Injectable 25 Gram(s) IV Push once  glucagon  Injectable 1 milliGRAM(s) IntraMuscular once  insulin glargine Injectable (LANTUS) 20 Unit(s) SubCutaneous at bedtime  insulin lispro (ADMELOG) corrective regimen sliding scale   SubCutaneous three times a day before meals  insulin lispro (ADMELOG) corrective regimen sliding scale   SubCutaneous at bedtime  lithium 300 milliGRAM(s) Oral at bedtime  melatonin. 3 milliGRAM(s) Oral at bedtime  QUEtiapine 450 milliGRAM(s) Oral at bedtime  sodium bicarbonate 650 milliGRAM(s) Oral two times a day  tamsulosin 0.4 milliGRAM(s) Oral at bedtime    MEDICATIONS  (PRN):  acetaminophen     Tablet .. 650 milliGRAM(s) Oral every 6 hours PRN Temp greater or equal to 38C (100.4F), Mild Pain (1 - 3), Moderate Pain (4 - 6), Severe Pain (7 - 10)  bisacodyl 5 milliGRAM(s) Oral every 12 hours PRN Constipation  dextrose Oral Gel 15 Gram(s) Oral once PRN Blood Glucose LESS THAN 70 milliGRAM(s)/deciliter  polyethylene glycol 3350 17 Gram(s) Oral daily PRN Constipation  QUEtiapine 25 milliGRAM(s) Oral every 6 hours PRN Anxiety/agitation

## 2022-10-19 NOTE — BH CONSULTATION LIAISON PROGRESS NOTE - NSBHMSESPEECH_PSY_A_CORE
Abnormal as indicated, otherwise normal...

## 2022-10-19 NOTE — BH INPATIENT PSYCHIATRY ASSESSMENT NOTE - HPI (INCLUDE ILLNESS QUALITY, SEVERITY, DURATION, TIMING, CONTEXT, MODIFYING FACTORS, ASSOCIATED SIGNS AND SYMPTOMS)
Patient is a 67 year man currently residing at [facility]. PPH Bipolar 1 Disorder. Hx of many inpatient admissions- last as few years ago at [facility] per brother. Hx of multiple past suicide attempts- none recent. + history of aggressive behavior in the context of non compliance with medication. No legal issues or substance use issues. PMH- DM2 (insulin dependent), constipation, generalized muscle weakness, BPH. BIBA referred by SNF for agitation, admitted at [facility] initially and found to be Covid+ on 10/11, now admitted at [facility] for ongoing management of daryl.     Patient states "I'm on lithium and Seroquel, I don't know why I am here."  He cannot give an account of events leading up to admission to [facility].  Was informed he has Covid and he denies this.  States he is irritable but feeling very good, and affect is more elevated than irritable.  States he was a  in his country and works as a  now.  Gives pseudonym to go by.  Denies living in [facility] but says he lives in [place] by himself.  He is alert and oriented in all spheres but did not understand this was a psychiatric hospital.  Denies SI.  Denies depressed mood.  Denies AH.  Denies physical complaints related to Covid or otherwise (neg ROS).  Perseverates on not liking certain doctors.    Per initial assessment in ED, findings confirmed: "Met with patient in room. He was lying down mumbling to self. He stated "porn is bad!" He reports he was brought to the ED "for a better life," and nothing is wrong with him. He was disorganized and challenging to interview. He denied SI/HI/SIB/intent/plan. He became agitated when asked about other symptoms "I'm not crazy, get out of here!" He then asked if evaluator was a prostitute and said "get out!".   Spoke with Brother Harshad- He reports patient has been on lithium most of his life and does well when he is compliant. He is disabled- history of multiple psychiatric hospitalizations and suicide attempts. He lives in SNF due to his limitations from his mental illness. He is extremely intelligent and "kind man" when he is compliant and responds well to his medication regimen. Mother  5 months ago and was at [place] as well. This has been hard for the patient. Patient's sister is also "mentally unwell" and may have triggered patient when she visited. Brother reports he uses a cane/walker. He does not have good balance. Patient requires assistance with ADLs- toileting/dressing self. He stated patient needs his food cut but he isn't sure. He is vaccinated- 2 boosters.   Spoke with nurse Fischer- Patient has a regular bed -[facility] hospital. He uses a walker, he is a 1 person assist, patient is on regular liquids and full regular diabetic diet. Patient uses a urinal and has a diaper "on sometimes." She reports currently patient needs assistance with all ADLs and tolieting/eating due to his behavior. At baseline he is independent."

## 2022-10-19 NOTE — BH CONSULTATION LIAISON PROGRESS NOTE - NSBHPSYCHOLCOGORIENT_PSY_A_CORE
Not fully oriented...
Unable to assess
Not fully oriented...
Unable to assess

## 2022-10-19 NOTE — BH CONSULTATION LIAISON PROGRESS NOTE - OTHER
Still mildly pressured, improved since last evaluation, moments in speech when pt was interruptible  [place]  Focused on calling brother so he can leave

## 2022-10-19 NOTE — DISCHARGE NOTE PROVIDER - CARE PROVIDER_API CALL
Primary care provider,   Please follow up with your primary care provider as needed  Phone: (   )    -  Fax: (   )    -  Follow Up Time:

## 2022-10-19 NOTE — BH CONSULTATION LIAISON PROGRESS NOTE - CURRENT MEDICATION
MEDICATIONS  (STANDING):  aspirin enteric coated 81 milliGRAM(s) Oral daily  dextrose 5%. 1000 milliLiter(s) (50 mL/Hr) IV Continuous <Continuous>  dextrose 5%. 1000 milliLiter(s) (100 mL/Hr) IV Continuous <Continuous>  dextrose 50% Injectable 25 Gram(s) IV Push once  dextrose 50% Injectable 12.5 Gram(s) IV Push once  dextrose 50% Injectable 25 Gram(s) IV Push once  enoxaparin Injectable 40 milliGRAM(s) SubCutaneous every 24 hours  glucagon  Injectable 1 milliGRAM(s) IntraMuscular once  insulin glargine Injectable (LANTUS) 20 Unit(s) SubCutaneous at bedtime  insulin lispro (ADMELOG) corrective regimen sliding scale   SubCutaneous three times a day before meals  insulin lispro (ADMELOG) corrective regimen sliding scale   SubCutaneous at bedtime  lithium 300 milliGRAM(s) Oral three times a day  melatonin 3 milliGRAM(s) Oral at bedtime  polyethylene glycol 3350 17 Gram(s) Oral daily  QUEtiapine 100 milliGRAM(s) Oral at bedtime  sodium bicarbonate 650 milliGRAM(s) Oral two times a day  tamsulosin 0.4 milliGRAM(s) Oral at bedtime    MEDICATIONS  (PRN):  bisacodyl 5 milliGRAM(s) Oral every 12 hours PRN Constipation  dextrose Oral Gel 15 Gram(s) Oral once PRN Blood Glucose LESS THAN 70 milliGRAM(s)/deciliter  OLANZapine Injectable 5 milliGRAM(s) IntraMuscular every 6 hours PRN Severe agitation  QUEtiapine 25 milliGRAM(s) Oral every 6 hours PRN Anxiety/agitation  
MEDICATIONS  (STANDING):  aspirin enteric coated 81 milliGRAM(s) Oral daily  dextrose 50% Injectable 25 Gram(s) IV Push once  enoxaparin Injectable 40 milliGRAM(s) SubCutaneous every 24 hours  insulin glargine Injectable (LANTUS) 20 Unit(s) SubCutaneous at bedtime  insulin lispro (ADMELOG) corrective regimen sliding scale   SubCutaneous three times a day before meals  insulin lispro (ADMELOG) corrective regimen sliding scale   SubCutaneous at bedtime  lithium 300 milliGRAM(s) Oral three times a day  melatonin 3 milliGRAM(s) Oral at bedtime  QUEtiapine 450 milliGRAM(s) Oral at bedtime  sodium bicarbonate 650 milliGRAM(s) Oral two times a day  tamsulosin 0.4 milliGRAM(s) Oral at bedtime    MEDICATIONS  (PRN):  acetaminophen     Tablet .. 650 milliGRAM(s) Oral every 6 hours PRN Temp greater or equal to 38C (100.4F), Mild Pain (1 - 3), Moderate Pain (4 - 6)  bisacodyl 5 milliGRAM(s) Oral every 12 hours PRN Constipation  OLANZapine Injectable 5 milliGRAM(s) IntraMuscular every 6 hours PRN Severe agitation  polyethylene glycol 3350 17 Gram(s) Oral daily PRN Constipation  QUEtiapine 25 milliGRAM(s) Oral every 6 hours PRN Anxiety/agitation  
MEDICATIONS  (STANDING):  aspirin enteric coated 81 milliGRAM(s) Oral daily  dextrose 5%. 1000 milliLiter(s) (50 mL/Hr) IV Continuous <Continuous>  dextrose 5%. 1000 milliLiter(s) (100 mL/Hr) IV Continuous <Continuous>  dextrose 50% Injectable 25 Gram(s) IV Push once  dextrose 50% Injectable 12.5 Gram(s) IV Push once  dextrose 50% Injectable 25 Gram(s) IV Push once  enoxaparin Injectable 40 milliGRAM(s) SubCutaneous every 24 hours  glucagon  Injectable 1 milliGRAM(s) IntraMuscular once  insulin glargine Injectable (LANTUS) 20 Unit(s) SubCutaneous at bedtime  insulin lispro (ADMELOG) corrective regimen sliding scale   SubCutaneous three times a day before meals  insulin lispro (ADMELOG) corrective regimen sliding scale   SubCutaneous at bedtime  lithium 300 milliGRAM(s) Oral three times a day  melatonin 3 milliGRAM(s) Oral at bedtime  polyethylene glycol 3350 17 Gram(s) Oral daily  QUEtiapine 100 milliGRAM(s) Oral at bedtime  sodium bicarbonate 650 milliGRAM(s) Oral two times a day  tamsulosin 0.4 milliGRAM(s) Oral at bedtime    MEDICATIONS  (PRN):  bisacodyl 5 milliGRAM(s) Oral every 12 hours PRN Constipation  dextrose Oral Gel 15 Gram(s) Oral once PRN Blood Glucose LESS THAN 70 milliGRAM(s)/deciliter  OLANZapine Injectable 5 milliGRAM(s) IntraMuscular every 6 hours PRN Severe agitation  QUEtiapine 25 milliGRAM(s) Oral every 6 hours PRN Anxiety/agitation  
MEDICATIONS  (STANDING):  aspirin enteric coated 81 milliGRAM(s) Oral daily  dextrose 5%. 1000 milliLiter(s) (50 mL/Hr) IV Continuous <Continuous>  dextrose 5%. 1000 milliLiter(s) (100 mL/Hr) IV Continuous <Continuous>  dextrose 50% Injectable 25 Gram(s) IV Push once  dextrose 50% Injectable 12.5 Gram(s) IV Push once  dextrose 50% Injectable 25 Gram(s) IV Push once  glucagon  Injectable 1 milliGRAM(s) IntraMuscular once  insulin glargine Injectable (LANTUS) 20 Unit(s) SubCutaneous at bedtime  insulin lispro (ADMELOG) corrective regimen sliding scale   SubCutaneous three times a day before meals  insulin lispro (ADMELOG) corrective regimen sliding scale   SubCutaneous at bedtime  lithium 300 milliGRAM(s) Oral three times a day  melatonin 3 milliGRAM(s) Oral at bedtime  polyethylene glycol 3350 17 Gram(s) Oral daily  QUEtiapine 100 milliGRAM(s) Oral at bedtime  sodium bicarbonate 650 milliGRAM(s) Oral two times a day  tamsulosin 0.4 milliGRAM(s) Oral at bedtime    MEDICATIONS  (PRN):  bisacodyl 5 milliGRAM(s) Oral every 12 hours PRN Constipation  dextrose Oral Gel 15 Gram(s) Oral once PRN Blood Glucose LESS THAN 70 milliGRAM(s)/deciliter  OLANZapine Injectable 5 milliGRAM(s) IntraMuscular every 6 hours PRN Severe agitation  QUEtiapine 25 milliGRAM(s) Oral every 6 hours PRN Anxiety/agitation  
MEDICATIONS  (STANDING):  aspirin enteric coated 81 milliGRAM(s) Oral daily  dextrose 5%. 1000 milliLiter(s) (100 mL/Hr) IV Continuous <Continuous>  dextrose 5%. 1000 milliLiter(s) (50 mL/Hr) IV Continuous <Continuous>  dextrose 50% Injectable 25 Gram(s) IV Push once  dextrose 50% Injectable 12.5 Gram(s) IV Push once  dextrose 50% Injectable 25 Gram(s) IV Push once  enoxaparin Injectable 40 milliGRAM(s) SubCutaneous every 24 hours  glucagon  Injectable 1 milliGRAM(s) IntraMuscular once  insulin glargine Injectable (LANTUS) 20 Unit(s) SubCutaneous at bedtime  insulin lispro (ADMELOG) corrective regimen sliding scale   SubCutaneous three times a day before meals  insulin lispro (ADMELOG) corrective regimen sliding scale   SubCutaneous at bedtime  lithium 300 milliGRAM(s) Oral three times a day  melatonin 3 milliGRAM(s) Oral at bedtime  polyethylene glycol 3350 17 Gram(s) Oral daily  QUEtiapine 200 milliGRAM(s) Oral at bedtime  sodium bicarbonate 650 milliGRAM(s) Oral two times a day  tamsulosin 0.4 milliGRAM(s) Oral at bedtime    MEDICATIONS  (PRN):  acetaminophen     Tablet .. 650 milliGRAM(s) Oral every 6 hours PRN Temp greater or equal to 38C (100.4F), Mild Pain (1 - 3), Moderate Pain (4 - 6)  bisacodyl 5 milliGRAM(s) Oral every 12 hours PRN Constipation  dextrose Oral Gel 15 Gram(s) Oral once PRN Blood Glucose LESS THAN 70 milliGRAM(s)/deciliter  OLANZapine Injectable 5 milliGRAM(s) IntraMuscular every 6 hours PRN Severe agitation  QUEtiapine 25 milliGRAM(s) Oral every 6 hours PRN Anxiety/agitation  
MEDICATIONS  (STANDING):  aspirin enteric coated 81 milliGRAM(s) Oral daily  dextrose 5%. 1000 milliLiter(s) (50 mL/Hr) IV Continuous <Continuous>  dextrose 5%. 1000 milliLiter(s) (100 mL/Hr) IV Continuous <Continuous>  dextrose 50% Injectable 25 Gram(s) IV Push once  dextrose 50% Injectable 12.5 Gram(s) IV Push once  dextrose 50% Injectable 25 Gram(s) IV Push once  glucagon  Injectable 1 milliGRAM(s) IntraMuscular once  insulin glargine Injectable (LANTUS) 20 Unit(s) SubCutaneous at bedtime  insulin lispro (ADMELOG) corrective regimen sliding scale   SubCutaneous three times a day before meals  insulin lispro (ADMELOG) corrective regimen sliding scale   SubCutaneous at bedtime  lithium 300 milliGRAM(s) Oral three times a day  melatonin 3 milliGRAM(s) Oral at bedtime  polyethylene glycol 3350 17 Gram(s) Oral daily  QUEtiapine 400 milliGRAM(s) Oral at bedtime  sodium bicarbonate 650 milliGRAM(s) Oral two times a day  tamsulosin 0.4 milliGRAM(s) Oral at bedtime    MEDICATIONS  (PRN):  bisacodyl 5 milliGRAM(s) Oral every 12 hours PRN Constipation  dextrose Oral Gel 15 Gram(s) Oral once PRN Blood Glucose LESS THAN 70 milliGRAM(s)/deciliter  
MEDICATIONS  (STANDING):  aspirin enteric coated 81 milliGRAM(s) Oral daily  dextrose 5%. 1000 milliLiter(s) (50 mL/Hr) IV Continuous <Continuous>  dextrose 5%. 1000 milliLiter(s) (100 mL/Hr) IV Continuous <Continuous>  dextrose 50% Injectable 25 Gram(s) IV Push once  dextrose 50% Injectable 12.5 Gram(s) IV Push once  dextrose 50% Injectable 25 Gram(s) IV Push once  enoxaparin Injectable 40 milliGRAM(s) SubCutaneous every 24 hours  glucagon  Injectable 1 milliGRAM(s) IntraMuscular once  insulin glargine Injectable (LANTUS) 20 Unit(s) SubCutaneous at bedtime  insulin lispro (ADMELOG) corrective regimen sliding scale   SubCutaneous three times a day before meals  insulin lispro (ADMELOG) corrective regimen sliding scale   SubCutaneous at bedtime  lithium 300 milliGRAM(s) Oral three times a day  melatonin 3 milliGRAM(s) Oral at bedtime  polyethylene glycol 3350 17 Gram(s) Oral daily  QUEtiapine 200 milliGRAM(s) Oral at bedtime  sodium bicarbonate 650 milliGRAM(s) Oral two times a day  tamsulosin 0.4 milliGRAM(s) Oral at bedtime    MEDICATIONS  (PRN):  acetaminophen     Tablet .. 650 milliGRAM(s) Oral every 6 hours PRN Temp greater or equal to 38C (100.4F), Mild Pain (1 - 3), Moderate Pain (4 - 6)  bisacodyl 5 milliGRAM(s) Oral every 12 hours PRN Constipation  dextrose Oral Gel 15 Gram(s) Oral once PRN Blood Glucose LESS THAN 70 milliGRAM(s)/deciliter  OLANZapine Injectable 5 milliGRAM(s) IntraMuscular every 6 hours PRN Severe agitation  QUEtiapine 25 milliGRAM(s) Oral every 6 hours PRN Anxiety/agitation  
MEDICATIONS  (STANDING):  aspirin enteric coated 81 milliGRAM(s) Oral daily  dextrose 5%. 1000 milliLiter(s) (50 mL/Hr) IV Continuous <Continuous>  dextrose 5%. 1000 milliLiter(s) (100 mL/Hr) IV Continuous <Continuous>  dextrose 50% Injectable 25 Gram(s) IV Push once  dextrose 50% Injectable 12.5 Gram(s) IV Push once  dextrose 50% Injectable 25 Gram(s) IV Push once  enoxaparin Injectable 40 milliGRAM(s) SubCutaneous every 24 hours  glucagon  Injectable 1 milliGRAM(s) IntraMuscular once  insulin glargine Injectable (LANTUS) 20 Unit(s) SubCutaneous at bedtime  insulin lispro (ADMELOG) corrective regimen sliding scale   SubCutaneous three times a day before meals  insulin lispro (ADMELOG) corrective regimen sliding scale   SubCutaneous at bedtime  lithium 300 milliGRAM(s) Oral three times a day  melatonin 3 milliGRAM(s) Oral at bedtime  QUEtiapine 400 milliGRAM(s) Oral at bedtime  sodium bicarbonate 650 milliGRAM(s) Oral two times a day  tamsulosin 0.4 milliGRAM(s) Oral at bedtime    MEDICATIONS  (PRN):  acetaminophen     Tablet .. 650 milliGRAM(s) Oral every 6 hours PRN Temp greater or equal to 38C (100.4F), Mild Pain (1 - 3), Moderate Pain (4 - 6)  bisacodyl 5 milliGRAM(s) Oral every 12 hours PRN Constipation  dextrose Oral Gel 15 Gram(s) Oral once PRN Blood Glucose LESS THAN 70 milliGRAM(s)/deciliter  OLANZapine Injectable 5 milliGRAM(s) IntraMuscular every 6 hours PRN Severe agitation  polyethylene glycol 3350 17 Gram(s) Oral daily PRN Constipation  QUEtiapine 25 milliGRAM(s) Oral every 6 hours PRN Anxiety/agitation  
MEDICATIONS  (STANDING):  aspirin enteric coated 81 milliGRAM(s) Oral daily  dextrose 50% Injectable 25 Gram(s) IV Push once  enoxaparin Injectable 40 milliGRAM(s) SubCutaneous every 24 hours  insulin glargine Injectable (LANTUS) 20 Unit(s) SubCutaneous at bedtime  insulin lispro (ADMELOG) corrective regimen sliding scale   SubCutaneous three times a day before meals  insulin lispro (ADMELOG) corrective regimen sliding scale   SubCutaneous at bedtime  lithium 300 milliGRAM(s) Oral three times a day  melatonin 3 milliGRAM(s) Oral at bedtime  QUEtiapine 400 milliGRAM(s) Oral at bedtime  sodium bicarbonate 650 milliGRAM(s) Oral two times a day  tamsulosin 0.4 milliGRAM(s) Oral at bedtime    MEDICATIONS  (PRN):  acetaminophen     Tablet .. 650 milliGRAM(s) Oral every 6 hours PRN Temp greater or equal to 38C (100.4F), Mild Pain (1 - 3), Moderate Pain (4 - 6)  bisacodyl 5 milliGRAM(s) Oral every 12 hours PRN Constipation  OLANZapine Injectable 5 milliGRAM(s) IntraMuscular every 6 hours PRN Severe agitation  polyethylene glycol 3350 17 Gram(s) Oral daily PRN Constipation  QUEtiapine 25 milliGRAM(s) Oral every 6 hours PRN Anxiety/agitation  
MEDICATIONS  (STANDING):  aspirin enteric coated 81 milliGRAM(s) Oral daily  dextrose 5%. 1000 milliLiter(s) (50 mL/Hr) IV Continuous <Continuous>  dextrose 5%. 1000 milliLiter(s) (100 mL/Hr) IV Continuous <Continuous>  dextrose 50% Injectable 25 Gram(s) IV Push once  dextrose 50% Injectable 12.5 Gram(s) IV Push once  dextrose 50% Injectable 25 Gram(s) IV Push once  enoxaparin Injectable 40 milliGRAM(s) SubCutaneous every 24 hours  glucagon  Injectable 1 milliGRAM(s) IntraMuscular once  insulin glargine Injectable (LANTUS) 20 Unit(s) SubCutaneous at bedtime  insulin lispro (ADMELOG) corrective regimen sliding scale   SubCutaneous three times a day before meals  insulin lispro (ADMELOG) corrective regimen sliding scale   SubCutaneous at bedtime  lithium 300 milliGRAM(s) Oral three times a day  melatonin 3 milliGRAM(s) Oral at bedtime  polyethylene glycol 3350 17 Gram(s) Oral daily  QUEtiapine 300 milliGRAM(s) Oral at bedtime  sodium bicarbonate 650 milliGRAM(s) Oral two times a day  tamsulosin 0.4 milliGRAM(s) Oral at bedtime    MEDICATIONS  (PRN):  acetaminophen     Tablet .. 650 milliGRAM(s) Oral every 6 hours PRN Temp greater or equal to 38C (100.4F), Mild Pain (1 - 3), Moderate Pain (4 - 6)  bisacodyl 5 milliGRAM(s) Oral every 12 hours PRN Constipation  dextrose Oral Gel 15 Gram(s) Oral once PRN Blood Glucose LESS THAN 70 milliGRAM(s)/deciliter  OLANZapine Injectable 5 milliGRAM(s) IntraMuscular every 6 hours PRN Severe agitation  QUEtiapine 25 milliGRAM(s) Oral every 6 hours PRN Anxiety/agitation

## 2022-10-19 NOTE — BH CONSULTATION LIAISON PROGRESS NOTE - NSBHPSYCHOLCOGABN_PSY_A_CORE
disoriented to time
disoriented to time/disoriented to situation
disoriented to time/disoriented to person
disoriented to time
disoriented to time
disoriented to time/disoriented to person

## 2022-10-19 NOTE — PROGRESS NOTE ADULT - PROBLEM SELECTOR PROBLEM 5
Type 2 diabetes mellitus, with long-term current use of insulin
Type 2 diabetes mellitus, with long-term current use of insulin
Need for prophylactic measure
BPH (benign prostatic hyperplasia)
Need for prophylactic measure
BPH (benign prostatic hyperplasia)
Type 2 diabetes mellitus, with long-term current use of insulin

## 2022-10-19 NOTE — BH CONSULTATION LIAISON PROGRESS NOTE - NSBHMSEMUSCLE_PSY_A_CORE
Normal muscle tone/strength
Unable to assess

## 2022-10-19 NOTE — PROGRESS NOTE ADULT - PROBLEM SELECTOR PLAN 6
- c/w home flomax
C/w home Flomax
C/w home Flomax
DVT ppx: Lovenox  Dispo: Will need inpatient psych for further management
C/w home Flomax
DVT ppx: Lovenox  Dispo: Patient is medically cleared for inpatient psych. 2PC consent signed with Dr. Luna. Patient stable for discharge to psych facility once bed is available.     33 minutes spent on discharge planning   Discussed w/ ACP Tori
- c/w home flomax
- c/w home flomax

## 2022-10-19 NOTE — BH CONSULTATION LIAISON PROGRESS NOTE - NSBHADMITCOUNSELOTHER_PSY_A_CORE FT
more than 50% of the time was spent in chart review, coordinating with team including sw, and also coordinating with brother. see above

## 2022-10-19 NOTE — PROGRESS NOTE ADULT - PROBLEM SELECTOR PROBLEM 1
COVID-19 virus infection
RBBB (right bundle branch block)
COVID-19 virus infection
COVID-19 virus infection
RBBB (right bundle branch block)
COVID-19 virus infection
COVID-19 virus infection

## 2022-10-19 NOTE — BH CONSULTATION LIAISON PROGRESS NOTE - NSBHMSETHTCONTENT_PSY_A_CORE
Delusions/Other
Delusions/Other
Unable to assess
Delusions/Other
Preoccupations
Delusions/Other

## 2022-10-19 NOTE — BH CONSULTATION LIAISON PROGRESS NOTE - NSBHMSESPABN_PSY_A_CORE
Pressured rate/Other
Loud volume/Pressured rate
Pressured rate/Other
Loud volume/Pressured rate/Other
Loud volume
Loud volume/Pressured rate

## 2022-10-19 NOTE — DISCHARGE NOTE PROVIDER - NSDCCPCAREPLAN_GEN_ALL_CORE_FT
PRINCIPAL DISCHARGE DIAGNOSIS  Diagnosis: Bipolar 1 disorder  Assessment and Plan of Treatment: You were treated by the behavioral health team and your medications were adjusted.   Transfer to [facility] for continued care.      SECONDARY DISCHARGE DIAGNOSES  Diagnosis: RBBB (right bundle branch block)  Assessment and Plan of Treatment: An echocardiogram was completed for a right bundle branch block that was identified on an EKG.  The echocardiogram was without abnormalities.

## 2022-10-19 NOTE — BH INPATIENT PSYCHIATRY ASSESSMENT NOTE - NSBHCHARTREVIEWVS_PSY_A_CORE FT
Vital Signs Last 24 Hrs  T(C): 36.6 (10-19-22 @ 16:32), Max: 36.8 (10-18-22 @ 21:30)  T(F): 97.8 (10-19-22 @ 16:32), Max: 98.3 (10-18-22 @ 21:30)  HR: 79 (10-19-22 @ 07:53) (76 - 79)  BP: 113/72 (10-19-22 @ 07:53) (100/60 - 134/84)  BP(mean): --  RR: 14 (10-19-22 @ 16:32) (14 - 18)  SpO2: 95% (10-19-22 @ 07:53) (95% - 99%)    Orthostatic VS  10-19-22 @ 16:32  Lying BP: --/-- HR: --  Sitting BP: 97/71 HR: 98  Standing BP: 99/62 HR: 101  Site: --  Mode: --

## 2022-10-19 NOTE — PROGRESS NOTE ADULT - PROBLEM SELECTOR PROBLEM 2
RBBB (right bundle branch block)
Agitation
RBBB (right bundle branch block)
RBBB (right bundle branch block)
Encephalopathy acute
RBBB (right bundle branch block)
RBBB (right bundle branch block)
Encephalopathy acute
Agitation

## 2022-10-19 NOTE — BH CONSULTATION LIAISON PROGRESS NOTE - NSBHMSEAFFRANGE_PSY_A_CORE
Full
Full
Constricted
Labile
Full
Full/Labile
Full
Full

## 2022-10-19 NOTE — BH INPATIENT PSYCHIATRY ASSESSMENT NOTE - NSBHMETABOLIC_PSY_ALL_CORE_FT
BMI: BMI (kg/m2): 32.9 (10-19-22 @ 16:32)  HbA1c: A1C with Estimated Average Glucose Result: 5.7 % (10-08-22 @ 10:20)    Glucose: POCT Blood Glucose.: 121 mg/dL (10-19-22 @ 12:04)    BP: --  Lipid Panel: Date/Time: 10-08-22 @ 10:20  Cholesterol, Serum: 151  Direct LDL: --  HDL Cholesterol, Serum: 54  Total Cholesterol/HDL Ration Measurement: --  Triglycerides, Serum: 73

## 2022-10-19 NOTE — DISCHARGE NOTE PROVIDER - HOSPITAL COURSE
67M male with hx of bipolar 1 disorder/MDD, T2DM, BPH, presenting from [facility] w/agitation, intermittently refusing medication, found to incidentally have RBBB, now with nosocomial COVID.     COVID-19 virus infection.   ·Nosocomial COVID+  - afebrile, saturating well on RA, CXR: clear lungs  - Offered Remdesivir given age and fever - Patient's brother Dr. Nikolai Kevin, prefers to observe off for now   - conservative/symptomatic management  - DVT ppx: lovenox.    RBBB (right bundle branch block).   · New in comparison to EKG from 2009  - patient is asymptomatic   - Chest pain free, no evidence of CHF, pro-, delta trop negative  - TTE reviewed and within normal limits   - No further inpatient work-up needed at this time, can follow-up with outpatient cards.    Agitation.   · Acute, suspect due to medication non-adherence, now taking Lithium and Seroquel    - Management as per psych  - c/w 1:1 enhanced supervision  - For Anxiety/Mild Agitation: Seroquel 25mg q6h prn  - For severe Agitation: Zyprexa 5mg IM q6h prn-DO NOT GIVE Ativan IV/IM within 1 hour of Zyprexa IM due to risk of sedation and/or respiratory depression.    Bipolar 1 disorder.   · Home meds: Lithium 300mg TID, Seroquel 400mg qHS, Buproprion 100mg   - Continue Lithium 300mg TID, monitor BUN/Cr  - increased Seroquel 400mg qHS, Buproprion on hold    - c/w Anxiety/Mild Agitation PRN: Seroquel 25mg PO q6h prn  - c/w Severe agitation PRN: Zyprexa 5mg IM Q 6PRN   -c/w Severe Agitation: Haldol/Benadryl PRN  - check EKG to monitor QTc  - Psych following, recs appreciated.    Type 2 diabetes mellitus, with long-term current use of insulin.   -Home regimen: Lantus 25U qHS   - decreased home dose of insulin glargine by 20% on admission  - FS currently well controlled on Lantus 20u qHS + ISS  - Monitor FS and adjust as needed.     BPH (benign prostatic hyperplasia).   - c/w home flomax.    Need for prophylactic measure.   DVT ppx: Lovenox  DISPO: COVID+ unit at [facility], patient is stable for transfer if bed is available.      On 10/19/22, case was discussed with Dr. Joshi, patient is medically cleared and optimized for discharge today. All medications were reviewed with attending, and sent to mutually agreed upon pharmacy

## 2022-10-19 NOTE — PROGRESS NOTE ADULT - PROBLEM SELECTOR PROBLEM 3
Agitation
Bipolar 1 disorder
Agitation
Bipolar 1 disorder
Agitation
Agitation
Bipolar 1 disorder
Bipolar 1 disorder
Agitation
Agitation

## 2022-10-19 NOTE — DISCHARGE NOTE NURSING/CASE MANAGEMENT/SOCIAL WORK - PATIENT PORTAL LINK FT
You can access the FollowMyHealth Patient Portal offered by [facility] by registering at the following website: [email or web address]. By joining [facility]’s FollowMyHealth portal, you will also be able to view your health information using other applications (apps) compatible with our system.

## 2022-10-20 LAB
GLUCOSE BLDC GLUCOMTR-MCNC: 133 MG/DL — HIGH (ref 70–99)
GLUCOSE BLDC GLUCOMTR-MCNC: 155 MG/DL — HIGH (ref 70–99)
GLUCOSE BLDC GLUCOMTR-MCNC: 226 MG/DL — HIGH (ref 70–99)
GLUCOSE BLDC GLUCOMTR-MCNC: 251 MG/DL — HIGH (ref 70–99)

## 2022-10-20 PROCEDURE — 99231 SBSQ HOSP IP/OBS SF/LOW 25: CPT

## 2022-10-20 PROCEDURE — 99223 1ST HOSP IP/OBS HIGH 75: CPT | Mod: CS

## 2022-10-20 RX ADMIN — Medication 1: at 21:36

## 2022-10-20 RX ADMIN — Medication 3 MILLIGRAM(S): at 21:02

## 2022-10-20 RX ADMIN — QUETIAPINE FUMARATE 450 MILLIGRAM(S): 200 TABLET, FILM COATED ORAL at 21:01

## 2022-10-20 RX ADMIN — LITHIUM CARBONATE 900 MILLIGRAM(S): 300 TABLET, EXTENDED RELEASE ORAL at 21:01

## 2022-10-20 RX ADMIN — INSULIN GLARGINE 20 UNIT(S): 100 INJECTION, SOLUTION SUBCUTANEOUS at 21:34

## 2022-10-20 RX ADMIN — Medication 1: at 12:04

## 2022-10-20 RX ADMIN — Medication 81 MILLIGRAM(S): at 09:33

## 2022-10-20 RX ADMIN — Medication 650 MILLIGRAM(S): at 23:02

## 2022-10-20 RX ADMIN — Medication 650 MILLIGRAM(S): at 09:32

## 2022-10-20 RX ADMIN — TAMSULOSIN HYDROCHLORIDE 0.4 MILLIGRAM(S): 0.4 CAPSULE ORAL at 23:01

## 2022-10-20 NOTE — BH INPATIENT PSYCHIATRY PROGRESS NOTE - NSBHMETABOLIC_PSY_ALL_CORE_FT
BMI: BMI (kg/m2): 32.9 (10-19-22 @ 16:32)  HbA1c: A1C with Estimated Average Glucose Result: 5.7 % (10-08-22 @ 10:20)    Glucose: POCT Blood Glucose.: 155 mg/dL (10-20-22 @ 11:34)    BP: --  Lipid Panel: Date/Time: 10-08-22 @ 10:20  Cholesterol, Serum: 151  Direct LDL: --  HDL Cholesterol, Serum: 54  Total Cholesterol/HDL Ration Measurement: --  Triglycerides, Serum: 73

## 2022-10-20 NOTE — DIETITIAN INITIAL EVALUATION ADULT - REASON INDICATOR FOR ASSESSMENT
Dx of DM, on anticogulant therapy (of note, patient is on aspirin, not on any food-drug interaction anticoagulant rx).

## 2022-10-20 NOTE — DIETITIAN INITIAL EVALUATION ADULT - ADD RECOMMEND
Send double vegetable portion per pt's request. Monitor PO intake/tolerance, weights, labs, BM's, and skin integrity. Encourage po intake as tolerated and honor food preferences PRN.

## 2022-10-20 NOTE — PSYCHIATRIC REHAB INITIAL EVALUATION - NSBHPRRECOMMEND_PSY_ALL_CORE
Writer met with patient to orient patient to the unit and introduce psychiatric rehabilitation staff and functions. Upon approach, patient was sitting in the dining room. Initially, patient was receptive and cooperative to engage with writer. However, the session was terminated prematurely due to patient’s inappropriate verbal engagement. Patient provided somewhat coherent narrative to his reason for admission as evidenced by him saying that he’s here because of his “bipolar.” Patient reported that he is a “.” Then, patient showed interest in art therapy and shared that he likes to paint. Out of context and out of flow, patient said “sex is sin.” Despite writer's redirection, patient continued to express inappropriate contents, unrelated to interview. The session was terminated and writer encouraged patient to make his needs known to staff. Throughout the session, patient was mildly elevated and disorganized. Psychiatric rehabilitation staff will assign an appropriate goal for patient to work on for next seven days and will be reassessed at a later time if needed. Psychiatric rehabilitation team will engage with patient daily and encourage patient to attend groups for symptom management and coping skills development.

## 2022-10-20 NOTE — DIETITIAN INITIAL EVALUATION ADULT - OTHER INFO
Patient is a 66 y/o man currently residing at [facility]. PPH Bipolar 1 Disorder. Hx of many inpatient admissions- last as few years ago at [facility] per brother. Hx of multiple past suicide attempts- none recent. + history of aggressive behavior in the context of non compliance with medication. PMHx of  DM2 (insulin dependent), constipation, generalized muscle weakness, BPH. BIBA referred by SNF for agitation, admitted at [facility] initially and found to be Covid+ on 10/11, now admitted at [facility] for ongoing management of daryl.  Presents with grandiosity, elevated/irritable mood, FOI.     Met with patient in the dining area today. Patient reports appetite has been good and he tries to avoid added sugar, with PO intake >75% at meals reported. Patient with missing teeth but states he tolerated diet well, denies chewing/swallowing difficulties at meals. Denies GI distress (nausea/vomiting/diarrhea/constipation). Patient would like to have double portion of vegetables at meals -- honored. NKFA reported, but dislikes pork and beef.   Noted patient with hx of DM with HgbA1C 5.7%-- good long term glycemic control. Patient did not exhibit interest in DM diet during encounter today, states "I've been avoiding sugar". On lantus and SSI for glycemic control per MD order, covered with Consistent Carbohydrate diet which remains appropriate at this time.     Wt hx per HIE: 110.7kg (8/14/19), 92.4kg (10/8/22), Current adm wt 95.25kg (10/19/22) - wt gain over 11 days. Overall wt down 14% x 3 yrs PTA, possible related to inadequate po PTA in setting of psychological causes vs medical hx.

## 2022-10-20 NOTE — BH SOCIAL WORK INITIAL PSYCHOSOCIAL EVALUATION - NSHIGHRISKBEHFT_PSY_ALL_CORE
Multiple previous suicide attempts, history of aggressive behavior in the context of noncompliance with medication.

## 2022-10-20 NOTE — CONSULT NOTE ADULT - ASSESSMENT
67M male with hx of bipolar 1 disorder/MDD, T2DM, BPH, presenting from [facility] w/agitation, intermittently refusing medication, found to incidentally have RBBB and nosocomial COVID at [facility] now transferred to [facility] for further psychiatric management. Medicine consulted for T2DM and evaluation after transfer.     #COVID  - afebrile, saturating well on RA, CXR: clear lungs  - Medicine team at [facility] offered Remdesivir given age and fever and discussed with patient's brother Dr. Nikolai Kevin, who preferred to observe off   - conservative/symptomatic management    #RBBB (right bundle branch block): New in comparison to EKG from 2009  - patient is asymptomatic   - Chest pain free, no evidence of CHF, pro-, delta trop negative at [facility]  - TTE performed: grossly within normal limits   - will need outptatient follow-up with cards after dc from [facility]    #T2DM with long-term current use of insulin.   Home regimen: Lantus 25U qHS   - patient's FS has been acceptable on the decreased dose of on Lantus 20u qHS + ISS at [facility]  - Monitor FS and adjust as needed.    #BPH (benign prostatic hyperplasia).   - c/w home flomax.    #Bipolar/agitation  -care per primary team

## 2022-10-20 NOTE — DIETITIAN INITIAL EVALUATION ADULT - PERTINENT LABORATORY DATA
CAPILLARY BLOOD GLUCOSE  POCT Blood Glucose.: 133 mg/dL (20 Oct 2022 16:20)  POCT Blood Glucose.: 155 mg/dL (20 Oct 2022 11:34)  POCT Blood Glucose.: 226 mg/dL (19 Oct 2022 21:46)    10-15 Phos 3.2 mg/dL 10-15 Alb 3.4 g/dL 10-08 Chol 151 mg/dL LDL --    HDL 54 mg/dL Trig 73 mg/dL    BMI: BMI (kg/m2): 32.9 (10-19-22 @ 16:32)  HbA1c: A1C with Estimated Average Glucose Result: 5.7 % (10-08-22 @ 10:20)    Glucose: POCT Blood Glucose.: 133 mg/dL (10-20-22 @ 16:20)    BP: --  Lipid Panel: Date/Time: 10-08-22 @ 10:20  Cholesterol, Serum: 151  Direct LDL: --  HDL Cholesterol, Serum: 54  Total Cholesterol/HDL Ration Measurement: --  Triglycerides, Serum: 73

## 2022-10-20 NOTE — CONSULT NOTE ADULT - SUBJECTIVE AND OBJECTIVE BOX
MARYMARYSAPNA FIFI  67y  Male      Patient is a 67y old  Male who presents with a chief complaint of agitation.    Medicine consulted for DM and evaluation after transfer from [facility].       PAST MEDICAL/SURGICAL HISTORY  PAST MEDICAL & SURGICAL HISTORY:  Bipolar affective disorder      DM2 (diabetes mellitus, type 2)      Bipolar 1 disorder      H/O left knee surgery          REVIEW OF SYSTEMS:    CONSTITUTIONAL: No weakness, fevers or chills  EYES: No visual change  ENT: No vertigo or throat pain   NECK: No pain or stiffness  RESPIRATORY: No cough, wheezing, hemoptysis; No shortness of breath  CARDIOVASCULAR: No chest pain or palpitations  GASTROINTESTINAL: No abdominal or epigastric pain. No nausea, vomiting, or hematemesis; No diarrhea or constipation. No melena or hematochezia.  GENITOURINARY: No dysuria, frequency or hematuria  NEUROLOGICAL: No numbness or weakness  SKIN: No itching, rashes  MSK: no joint pain, full ROM  PSYCH: no anxiety no depression      T(C): 36.4 (10-20-22 @ 07:16), Max: 36.6 (10-19-22 @ 16:32)  HR: --  BP: --  RR: 14 (10-19-22 @ 16:32) (14 - 14)  SpO2: --  Wt(kg): --Vital Signs Last 24 Hrs  T(C): 36.4 (20 Oct 2022 07:16), Max: 36.6 (19 Oct 2022 16:32)  T(F): 97.6 (20 Oct 2022 07:16), Max: 97.9 (19 Oct 2022 17:26)  HR: --  BP: --  BP(mean): --  RR: 14 (19 Oct 2022 16:32) (14 - 14)  SpO2: --          PHYSICAL EXAM:  GENERAL: NAD, well-developed, alert, resting comfortably  HEAD:  Atraumatic, Normocephalic  EYES: EOMI, PERRLA, conjunctiva and sclera clear  NECK: Supple, No JVD  CHEST/LUNG: Clear to auscultation bilaterally; No wheeze, ronchi or rales  HEART: Regular rate and rhythm; No murmurs, rubs, or gallops  ABDOMEN: Soft, Nontender, Nondistended; Bowel sounds present  EXTREMITIES:  2+ Peripheral Pulses, No clubbing, cyanosis, or edema  PSYCH: AAOx3, normal behavior, normal affect  NEUROLOGY: non-focal, normal strength, normal sensation  SKIN: No rashes or lesions      Consultant(s) Notes Reviewed:  [x ] YES  [ ] NO  Care Discussed with Consultants/Other Providers [ x] YES  [ ] NO    LABS:  CBC       BMP      CMP      PT/INR      Amylase/Lipase            RADIOLOGY & ADDITIONAL TESTS:    Imaging Personally Reviewed:  [ ] YES  [ ] NO FIFI AHN  67y  Male      Patient is a 67y old  Male who presents with a chief complaint of agitation.    Medicine consulted for DM and evaluation after transfer from [facility]. Reports feeling fine. Denies cough, sob, cp, fever, chills. Reports taking insulin at home. Eating and drinking water.       PAST MEDICAL/SURGICAL HISTORY  PAST MEDICAL & SURGICAL HISTORY:  Bipolar affective disorder      DM2 (diabetes mellitus, type 2)      Bipolar 1 disorder      H/O left knee surgery    Denies family history of DM.  Denies tobacco, alcohol or drug use.        REVIEW OF SYSTEMS:    CONSTITUTIONAL: No weakness, fevers or chills  EYES: No visual change  ENT: No vertigo or throat pain   NECK: No pain or stiffness  RESPIRATORY: No cough, wheezing, hemoptysis; No shortness of breath  CARDIOVASCULAR: No chest pain or palpitations  GASTROINTESTINAL: No abdominal or epigastric pain. No nausea, vomiting; No diarrhea or constipation  GENITOURINARY: No dysuria, frequency  NEUROLOGICAL: No numbness or weakness  SKIN: No itching, rashes  MSK: no joint pain, full ROM  PSYCH: no anxiety no depression      T(C): 36.4 (10-20-22 @ 07:16), Max: 36.6 (10-19-22 @ 16:32)  HR: --  BP: --  RR: 14 (10-19-22 @ 16:32) (14 - 14)  SpO2: --  Wt(kg): --Vital Signs Last 24 Hrs  T(C): 36.4 (20 Oct 2022 07:16), Max: 36.6 (19 Oct 2022 16:32)  T(F): 97.6 (20 Oct 2022 07:16), Max: 97.9 (19 Oct 2022 17:26)  HR: --  BP: --  BP(mean): --  RR: 14 (19 Oct 2022 16:32) (14 - 14)  SpO2: --          PHYSICAL EXAM:  GENERAL: NAD, well-developed, sleeping in bed, easily arousable by voice  HEAD:  Atraumatic, Normocephalic  EYES: EOMI, conjunctiva and sclera clear  NECK: Supple, No JVD  CHEST/LUNG: Clear to auscultation bilaterally; No wheeze, ronchi or rales  HEART: Regular rate and rhythm; No murmurs, rubs, or gallops  ABDOMEN: Soft, Nontender, Nondistended; Bowel sounds present  EXTREMITIES:  2+ Peripheral Pulses, No clubbing, cyanosis, or edema  PSYCH: calm, normal behavior, normal affect  NEUROLOGY: non-focal, normal strength, normal sensation  SKIN: No rashes or lesions      Consultant(s) Notes Reviewed:  [x ] YES  [ ] NO  Care Discussed with Consultants/Other Providers [ x] YES  [ ] NO    LABS:  CBC       BMP      CMP      PT/INR      Amylase/Lipase            RADIOLOGY & ADDITIONAL TESTS:    Imaging Personally Reviewed:  [ ] YES  [ ] NO

## 2022-10-20 NOTE — BH INPATIENT PSYCHIATRY PROGRESS NOTE - NSBHFUPINTERVALHXFT_PSY_A_CORE
Chart reviewed.  Discussed with interdisciplinary team.  Slept well.  Compliant with standing medications overnight.  No overnight events.  Patient is hyperverbal this morning.  Reiterates he only needs lithium and Seroquel and cites lower doses than he had been getting at [facility].  States he does not like psychiatrists and does not like female doctors.  Is pressured and difficult to interrupt throughout interview.  States he is in a good mood.  Is willing to take medication even at higher dose and will comply with bloodwork tomorrow even though he thinks it is unnecessary.  Denies Covid sx.

## 2022-10-20 NOTE — BH SOCIAL WORK INITIAL PSYCHOSOCIAL EVALUATION - OTHER PAST PSYCHIATRIC HISTORY (INCLUDE DETAILS REGARDING ONSET, COURSE OF ILLNESS, INPATIENT/OUTPATIENT TREATMENT)
Patient is a 67 year man currently residing at [facility] with PPH Bipolar 1 Disorder, history of multiple inpatient admissions- last as few years ago at [place] per brother. Hx of multiple past suicide attempts- none recent. + history of aggressive behavior in the context of non compliance with medication. No legal issues or substance use issues. Pt was  BIBA referred by SNF for agitation. His mother, who resided at the same nursing home,  5 months ago. Pt's sister, who has unknown mental illness, visited pt and may have triggered him.

## 2022-10-20 NOTE — BH INPATIENT PSYCHIATRY PROGRESS NOTE - NSBHASSESSSUMMFT_PSY_ALL_CORE
Patient is a 67 year man currently residing at [facility]. PPH Bipolar 1 Disorder. Hx of many inpatient admissions- last as few years ago at [place] per brother. Hx of multiple past suicide attempts- none recent. + history of aggressive behavior in the context of non compliance with medication. No legal issues or substance use issues. PMH- DM2 (insulin dependent), constipation, generalized muscle weakness, BPH. BIBA referred by SNF for agitation, admitted at [facility] initially and found to be Covid+ on 10/11, now admitted at [facility] for ongoing management of daryl.  Presents with grandiosity, elevated/irritable mood, FOI.     Plan:    Admit 9.27 status, no CO 1:1 given no SI despite h/o SAs  Routine checks  Continue with current regimen from [facility] CL:  900mg Lithobid qHS thereafter  melatonin. 3 milliGRAM(s) Oral at bedtime  QUEtiapine 450 milliGRAM(s) Oral at bedtime  Will consider additional mood stabilizer given persistence of daryl on current regimen.  DM2: Lantus 20mg qHS; ISS  medical medications: sodium bicarbonate 650 milliGRAM(s) Oral two times a day; tamsulosin 0.4 milliGRAM(s) Oral at bedtime; aspirin enteric coated 81 milliGRAM(s) Oral daily  Group and milieu therapy

## 2022-10-21 LAB
ANION GAP SERPL CALC-SCNC: 8 MMOL/L — SIGNIFICANT CHANGE UP (ref 7–14)
BUN SERPL-MCNC: 25 MG/DL — HIGH (ref 7–23)
CALCIUM SERPL-MCNC: 9.7 MG/DL — SIGNIFICANT CHANGE UP (ref 8.4–10.5)
CHLORIDE SERPL-SCNC: 101 MMOL/L — SIGNIFICANT CHANGE UP (ref 98–107)
CO2 SERPL-SCNC: 24 MMOL/L — SIGNIFICANT CHANGE UP (ref 22–31)
CREAT SERPL-MCNC: 0.9 MG/DL — SIGNIFICANT CHANGE UP (ref 0.5–1.3)
EGFR: 94 ML/MIN/1.73M2 — SIGNIFICANT CHANGE UP
GLUCOSE BLDC GLUCOMTR-MCNC: 115 MG/DL — HIGH (ref 70–99)
GLUCOSE BLDC GLUCOMTR-MCNC: 129 MG/DL — HIGH (ref 70–99)
GLUCOSE BLDC GLUCOMTR-MCNC: 145 MG/DL — HIGH (ref 70–99)
GLUCOSE BLDC GLUCOMTR-MCNC: 150 MG/DL — HIGH (ref 70–99)
GLUCOSE BLDC GLUCOMTR-MCNC: 157 MG/DL — HIGH (ref 70–99)
GLUCOSE SERPL-MCNC: 246 MG/DL — HIGH (ref 70–99)
LITHIUM SERPL-MCNC: 0.7 MMOL/L — SIGNIFICANT CHANGE UP (ref 0.6–1.2)
POTASSIUM SERPL-MCNC: 4.9 MMOL/L — SIGNIFICANT CHANGE UP (ref 3.5–5.3)
POTASSIUM SERPL-SCNC: 4.9 MMOL/L — SIGNIFICANT CHANGE UP (ref 3.5–5.3)
SODIUM SERPL-SCNC: 133 MMOL/L — LOW (ref 135–145)

## 2022-10-21 PROCEDURE — 99233 SBSQ HOSP IP/OBS HIGH 50: CPT

## 2022-10-21 RX ADMIN — Medication 1: at 12:13

## 2022-10-21 RX ADMIN — Medication 81 MILLIGRAM(S): at 08:30

## 2022-10-21 RX ADMIN — INSULIN GLARGINE 20 UNIT(S): 100 INJECTION, SOLUTION SUBCUTANEOUS at 21:56

## 2022-10-21 RX ADMIN — LITHIUM CARBONATE 900 MILLIGRAM(S): 300 TABLET, EXTENDED RELEASE ORAL at 20:51

## 2022-10-21 RX ADMIN — Medication 650 MILLIGRAM(S): at 08:30

## 2022-10-21 NOTE — BH INPATIENT PSYCHIATRY PROGRESS NOTE - NSBHATTESTBILLINGAW_PSY_A_CORE
[id number] Inpatient care - moderate complexity - 25 minutes [id number] Inpatient care - high complexity - 35 minutes

## 2022-10-21 NOTE — BH INPATIENT PSYCHIATRY PROGRESS NOTE - MSE UNSTRUCTURED FT
Appearance: appears stated age, fair hygiene and grooming, dressed appropriate for the situation, no gait abnml    Behavior: alert, cooperative with interview questions, maintains fair eye contact,   Motor: no psychomotor retardation or psychomotor agitation   Speech: pressured speech, hyperverbal, normal tone, spontaneous speech, poor articulation  Mood: "good"   Affect: elevated, irritable at times, labile, congruent with stated mood, full range,   Thought Process: flight of ideas, disorganized, loose associations   Thought Content: grandiose delusions, no suicidal or homicidal ideations,   Perceptual Disturbances: no auditory or visual hallucinations   Insight: poor  Judgement: poor  Impulse Control: fair  Cognition: alert and oriented to all spheres, good fund of knowledge

## 2022-10-21 NOTE — BH INPATIENT PSYCHIATRY PROGRESS NOTE - NSBHMETABOLIC_PSY_ALL_CORE_FT
BMI: BMI (kg/m2): 32.9 (10-19-22 @ 16:32)  HbA1c: A1C with Estimated Average Glucose Result: 5.7 % (10-08-22 @ 10:20)    Glucose: POCT Blood Glucose.: 145 mg/dL (10-21-22 @ 07:47)    BP: --  Lipid Panel: Date/Time: 10-08-22 @ 10:20  Cholesterol, Serum: 151  Direct LDL: --  HDL Cholesterol, Serum: 54  Total Cholesterol/HDL Ration Measurement: --  Triglycerides, Serum: 73   BMI: BMI (kg/m2): 32.9 (10-19-22 @ 16:32)  HbA1c: A1C with Estimated Average Glucose Result: 5.7 % (10-08-22 @ 10:20)    Glucose: POCT Blood Glucose.: 157 mg/dL (10-21-22 @ 12:04)    BP: --  Lipid Panel: Date/Time: 10-08-22 @ 10:20  Cholesterol, Serum: 151  Direct LDL: --  HDL Cholesterol, Serum: 54  Total Cholesterol/HDL Ration Measurement: --  Triglycerides, Serum: 73

## 2022-10-21 NOTE — BH INPATIENT PSYCHIATRY PROGRESS NOTE - NSBHFUPINTERVALHXFT_PSY_A_CORE
Chart reviewed.  Discussed with interdisciplinary team.  No overnight events reported. Pt reports he is feeling good this morning. He is adherent with standing medications. Denies any adverse effects with increased Lithium dose. Slept well last night. He denies COVID symptoms. He became irritable when discussing possibility of changing medications pending lab results and transferring him to another unit. He remains hyperverbal with pressured speech and thoughts concerning for grandiose delusions. He states he has a wife/girlfriend that he met in [place] and owns a house in the [place]. Chart reviewed.  Discussed with interdisciplinary team.  No overnight events reported. Pt reports he is feeling good this morning. He is adherent with standing medications. Denies any adverse effects with increased Lithium dose. Slept well last night. He denies COVID symptoms. He became irritable when discussing possibility of changing medications pending lab results and transferring him to another unit. He remains hyperverbal with pressured speech and thoughts concerning for grandiose delusions. Mentions his girlfriend that he met in [place]  Later in account states they are  and does not have good reason, when asked, why he does not refer to her as his wife and gives tangential and illogical account of their relationship.  Also states he owns a house in the [facility].

## 2022-10-21 NOTE — BH INPATIENT PSYCHIATRY PROGRESS NOTE - NSBHASSESSSUMMFT_PSY_ALL_CORE
Patient is a 67 year man currently residing at [facility]. PPH Bipolar 1 Disorder. Hx of many inpatient admissions- last as few years ago at [place] per brother. Hx of multiple past suicide attempts- none recent. + history of aggressive behavior in the context of non compliance with medication. No legal issues or substance use issues. PMH- DM2 (insulin dependent), constipation, generalized muscle weakness, BPH. BIBA referred by SNF for agitation, admitted at [facility] initially and found to be Covid+ on 10/11, now admitted at [facility] for ongoing management of daryl.  Presents with grandiosity, elevated/irritable mood, FOI.   On exam, continues to have grandiose delusions, elevated mood, pressured speech, and flight of ideas, consistent with daryl. No SI/HI. No auditory or visual hallucinations.     Plan:    Admit 9.27 status, no CO 1:1 given no SI despite h/o SAs  Routine checks  Continue with current regimen from [facility] CL:  900mg Lithobid qHS thereafter  melatonin. 3 milliGRAM(s) Oral at bedtime  QUEtiapine 450 milliGRAM(s) Oral at bedtime  Will consider additional mood stabilizer given persistence of daryl on current regimen.  DM2: Lantus 20mg qHS; ISS  medical medications: sodium bicarbonate 650 milliGRAM(s) Oral two times a day; tamsulosin 0.4 milliGRAM(s) Oral at bedtime; aspirin enteric coated 81 milliGRAM(s) Oral daily  Group and milieu therapy   Patient is a 67 year man currently residing at [facility]. PPH Bipolar 1 Disorder. Hx of many inpatient admissions- last as few years ago at [place] per brother. Hx of multiple past suicide attempts- none recent. + history of aggressive behavior in the context of non compliance with medication. No legal issues or substance use issues. PMH- DM2 (insulin dependent), constipation, generalized muscle weakness, BPH. BIBA referred by SNF for agitation, admitted at [facility] initially and found to be Covid+ on 10/11, now admitted at [facility] for ongoing management of daryl.  Presents with grandiosity, elevated/irritable mood, FOI.   On exam, continues to have grandiose delusions, elevated mood, pressured speech, and flight of ideas, consistent with daryl. No SI/HI. No auditory or visual hallucinations. Remains grandiose despite 0.7 lithium level on current dose and Seroquel 450mg.  Given good behavioral control, will allow more time on med before increasing Li dose or adding VPA.  No signs of Li toxicity on exam.     Plan:    Admit 9.27 status, no CO 1:1 given no SI despite h/o SAs  Routine checks  Continue with current regimen from [facility] CL:  900mg Lithobid qHS thereafter  melatonin. 3 milliGRAM(s) Oral at bedtime  QUEtiapine 450 milliGRAM(s) Oral at bedtime  Will consider additional mood stabilizer given persistence of daryl on current regimen.  DM2: Lantus 20mg qHS; ISS  medical medications: sodium bicarbonate 650 milliGRAM(s) Oral two times a day; tamsulosin 0.4 milliGRAM(s) Oral at bedtime; aspirin enteric coated 81 milliGRAM(s) Oral daily  Group and milieu therapy

## 2022-10-21 NOTE — BH INPATIENT PSYCHIATRY PROGRESS NOTE - NSBHATTESTCOMMENTATTENDFT_PSY_A_CORE
Remains grandiose despite 0.7 lithium level on current dose and Seroquel 450mg.  Given good behavioral control, will allow more time on med before increasing Li dose or adding VPA.  No signs of Li toxicity on exam. Will monitor Na which is borderline low.   Remains grandiose despite 0.7 lithium level on current dose and Seroquel 450mg.  Given good behavioral control, will allow more time on med before increasing Li dose or adding VPA.  No signs of Li toxicity on exam. Will monitor Na which is borderline low at 133.  Discussed with hospitalist.  Will repeat BMP, Urine and serum Osm, lytes on Monday AM.  Will encourage salty foods for now.

## 2022-10-22 LAB
GLUCOSE BLDC GLUCOMTR-MCNC: 135 MG/DL — HIGH (ref 70–99)
GLUCOSE BLDC GLUCOMTR-MCNC: 167 MG/DL — HIGH (ref 70–99)
GLUCOSE BLDC GLUCOMTR-MCNC: 173 MG/DL — HIGH (ref 70–99)
GLUCOSE BLDC GLUCOMTR-MCNC: 225 MG/DL — HIGH (ref 70–99)

## 2022-10-22 PROCEDURE — 99231 SBSQ HOSP IP/OBS SF/LOW 25: CPT

## 2022-10-22 RX ADMIN — Medication 3 MILLIGRAM(S): at 21:52

## 2022-10-22 RX ADMIN — Medication 650 MILLIGRAM(S): at 21:51

## 2022-10-22 RX ADMIN — QUETIAPINE FUMARATE 450 MILLIGRAM(S): 200 TABLET, FILM COATED ORAL at 21:44

## 2022-10-22 RX ADMIN — INSULIN GLARGINE 20 UNIT(S): 100 INJECTION, SOLUTION SUBCUTANEOUS at 21:49

## 2022-10-22 RX ADMIN — TAMSULOSIN HYDROCHLORIDE 0.4 MILLIGRAM(S): 0.4 CAPSULE ORAL at 21:51

## 2022-10-22 RX ADMIN — LITHIUM CARBONATE 900 MILLIGRAM(S): 300 TABLET, EXTENDED RELEASE ORAL at 21:51

## 2022-10-22 NOTE — BH INPATIENT PSYCHIATRY PROGRESS NOTE - MSE UNSTRUCTURED FT
On exam today the patient is irritable, demanding and uncooperative with questions.    Speech is loud,  rapid and pressured.  Thought process: with disorder of thought process.    Thought content: with paranoid psychotic beliefs.   Affect: labile.    Patient refuses to answer questions about SI/HI, AH and Mood, or Memory  Patient is Alert and oriented 		  Insight and judgment are impaired. Impulse control is tenuous at this time

## 2022-10-22 NOTE — BH INPATIENT PSYCHIATRY PROGRESS NOTE - NSBHASSESSSUMMFT_PSY_ALL_CORE
Patient is a 67 year man currently residing at [facility]. PPH Bipolar 1 Disorder. Hx of many inpatient admissions- last as few years ago at [place] per brother. Hx of multiple past suicide attempts- none recent. + history of aggressive behavior in the context of non compliance with medication. No legal issues or substance use issues. PMH- DM2 (insulin dependent), constipation, generalized muscle weakness, BPH. BIBA referred by SNF for agitation, admitted at [facility] initially and found to be Covid+ on 10/11, now admitted at [facility] for ongoing management of daryl.  Presents with grandiosity, elevated/irritable mood, FOI.   On exam, continues to have grandiose delusions, elevated mood, pressured speech, and flight of ideas, consistent with daryl. No SI/HI. No auditory or visual hallucinations. Remains grandiose despite 0.7 lithium level on current dose and Seroquel 450mg.  Given good behavioral control, will allow more time on med before increasing Li dose or adding VPA.  No signs of Li toxicity on exam.     10/22  Clinically worse on exam today  More manic and more irritable  Plan:    Admit 9.27 status, no CO 1:1 given no SI despite h/o SAs  Routine checks  Continue with current regimen from [facility] CL:  900mg Lithobid qHS thereafter  melatonin. 3 milliGRAM(s) Oral at bedtime  QUEtiapine 450 milliGRAM(s) Oral at bedtime  Will consider additional mood stabilizer given persistence of daryl on current regimen.  DM2: Lantus 20mg qHS; ISS  medical medications: sodium bicarbonate 650 milliGRAM(s) Oral two times a day; tamsulosin 0.4 milliGRAM(s) Oral at bedtime; aspirin enteric coated 81 milliGRAM(s) Oral daily  Group and milieu therapy

## 2022-10-22 NOTE — BH INPATIENT PSYCHIATRY PROGRESS NOTE - NSBHFUPINTERVALHXFT_PSY_A_CORE
Patient seen for follow up of Brii in the Day room  Chart reviewed and case discussed with nursing staff  Patient irritable and demanding and starts to become agitated when demanding his discharge  Staff needed to intervene as patient's irritability escalated   .Patient yells that he will refuse meds and report [facility] if not discharged immediately

## 2022-10-22 NOTE — BH INPATIENT PSYCHIATRY PROGRESS NOTE - NSBHMETABOLIC_PSY_ALL_CORE_FT
BMI: BMI (kg/m2): 32.9 (10-19-22 @ 16:32)  HbA1c: A1C with Estimated Average Glucose Result: 5.7 % (10-08-22 @ 10:20)    Glucose: POCT Blood Glucose.: 167 mg/dL (10-22-22 @ 16:44)    BP: --  Lipid Panel: Date/Time: 10-08-22 @ 10:20  Cholesterol, Serum: 151  Direct LDL: --  HDL Cholesterol, Serum: 54  Total Cholesterol/HDL Ration Measurement: --  Triglycerides, Serum: 73

## 2022-10-23 LAB
GLUCOSE BLDC GLUCOMTR-MCNC: 161 MG/DL — HIGH (ref 70–99)
GLUCOSE BLDC GLUCOMTR-MCNC: 163 MG/DL — HIGH (ref 70–99)
GLUCOSE BLDC GLUCOMTR-MCNC: 235 MG/DL — HIGH (ref 70–99)

## 2022-10-23 PROCEDURE — 99231 SBSQ HOSP IP/OBS SF/LOW 25: CPT

## 2022-10-23 RX ADMIN — Medication 1: at 16:31

## 2022-10-23 RX ADMIN — Medication 650 MILLIGRAM(S): at 09:20

## 2022-10-23 RX ADMIN — Medication 81 MILLIGRAM(S): at 09:20

## 2022-10-23 RX ADMIN — Medication 3 MILLIGRAM(S): at 20:58

## 2022-10-23 RX ADMIN — TAMSULOSIN HYDROCHLORIDE 0.4 MILLIGRAM(S): 0.4 CAPSULE ORAL at 20:58

## 2022-10-23 RX ADMIN — Medication 2: at 12:30

## 2022-10-23 RX ADMIN — Medication 650 MILLIGRAM(S): at 20:58

## 2022-10-23 RX ADMIN — LITHIUM CARBONATE 900 MILLIGRAM(S): 300 TABLET, EXTENDED RELEASE ORAL at 20:58

## 2022-10-23 RX ADMIN — INSULIN GLARGINE 20 UNIT(S): 100 INJECTION, SOLUTION SUBCUTANEOUS at 21:16

## 2022-10-23 RX ADMIN — QUETIAPINE FUMARATE 450 MILLIGRAM(S): 200 TABLET, FILM COATED ORAL at 20:58

## 2022-10-23 NOTE — BH INPATIENT PSYCHIATRY PROGRESS NOTE - NSBHFUPINTERVALHXFT_PSY_A_CORE
Patient seen for follow up of daryl with psychosis  Chart reviewed and case discussed with nursing staff  Patient had episode nearly this AM when he left his room with no clothes on requiring redirection  More able to have a discussion about his meds today then yesterday when he was more irritable and intrusive  Continues to focus on discharge but in better behavioral control

## 2022-10-23 NOTE — BH INPATIENT PSYCHIATRY PROGRESS NOTE - MSE UNSTRUCTURED FT
On exam today the patient is less irritable and more cooperative with questions.    Speech is of lower volume and less   rapid and pressured.  Thought process: with disorder of thought process.    Thought content: with paranoid psychotic beliefs.   Affect: less irritable.    Patient refuses to answer questions about SI/HI, AH and Mood, or Memory  Patient is Alert and oriented 		  Insight and judgment are impaired. Impulse control is tenuous at this time

## 2022-10-23 NOTE — BH INPATIENT PSYCHIATRY PROGRESS NOTE - NSBHMETABOLIC_PSY_ALL_CORE_FT
BMI: BMI (kg/m2): 32.9 (10-19-22 @ 16:32)  HbA1c: A1C with Estimated Average Glucose Result: 5.7 % (10-08-22 @ 10:20)    Glucose: POCT Blood Glucose.: 161 mg/dL (10-23-22 @ 16:14)    BP: --  Lipid Panel: Date/Time: 10-08-22 @ 10:20  Cholesterol, Serum: 151  Direct LDL: --  HDL Cholesterol, Serum: 54  Total Cholesterol/HDL Ration Measurement: --  Triglycerides, Serum: 73

## 2022-10-23 NOTE — BH INPATIENT PSYCHIATRY PROGRESS NOTE - NSBHASSESSSUMMFT_PSY_ALL_CORE
Patient is a 67 year man currently residing at [facility]. PPH Bipolar 1 Disorder. Hx of many inpatient admissions- last as few years ago at [place] per brother. Hx of multiple past suicide attempts- none recent. + history of aggressive behavior in the context of non compliance with medication. No legal issues or substance use issues. PMH- DM2 (insulin dependent), constipation, generalized muscle weakness, BPH. BIBA referred by SNF for agitation, admitted at [facility] initially and found to be Covid+ on 10/11, now admitted at [facility] for ongoing management of daryl.  Presents with grandiosity, elevated/irritable mood, FOI.   On exam, continues to have grandiose delusions, elevated mood, pressured speech, and flight of ideas, consistent with daryl. No SI/HI. No auditory or visual hallucinations. Remains grandiose despite 0.7 lithium level on current dose and Seroquel 450mg.  Given good behavioral control, will allow more time on med before increasing Li dose or adding VPA.  No signs of Li toxicity on exam.     10/23  Clinically in better control than 10/22  Remains discharge focused but agreed to new doses of medications   Plan:    Admit 9.27 status, no CO 1:1 given no SI despite h/o SAs  Routine checks  Continue with current regimen from [facility] CL:  900mg Lithobid qHS thereafter  melatonin. 3 milliGRAM(s) Oral at bedtime  QUEtiapine 450 milliGRAM(s) Oral at bedtime  Will consider additional mood stabilizer given persistence of daryl on current regimen.  DM2: Lantus 20mg qHS; ISS  medical medications: sodium bicarbonate 650 milliGRAM(s) Oral two times a day; tamsulosin 0.4 milliGRAM(s) Oral at bedtime; aspirin enteric coated 81 milliGRAM(s) Oral daily  Group and milieu therapy

## 2022-10-24 LAB
GLUCOSE BLDC GLUCOMTR-MCNC: 142 MG/DL — HIGH (ref 70–99)
GLUCOSE BLDC GLUCOMTR-MCNC: 158 MG/DL — HIGH (ref 70–99)
GLUCOSE BLDC GLUCOMTR-MCNC: 204 MG/DL — HIGH (ref 70–99)
GLUCOSE BLDC GLUCOMTR-MCNC: 226 MG/DL — HIGH (ref 70–99)

## 2022-10-24 PROCEDURE — 99231 SBSQ HOSP IP/OBS SF/LOW 25: CPT | Mod: GC

## 2022-10-24 RX ORDER — DIVALPROEX SODIUM 500 MG/1
500 TABLET, DELAYED RELEASE ORAL AT BEDTIME
Refills: 0 | Status: DISCONTINUED | OUTPATIENT
Start: 2022-10-24 | End: 2022-10-31

## 2022-10-24 RX ADMIN — Medication 3 MILLIGRAM(S): at 21:13

## 2022-10-24 RX ADMIN — Medication 81 MILLIGRAM(S): at 08:19

## 2022-10-24 RX ADMIN — Medication 650 MILLIGRAM(S): at 21:13

## 2022-10-24 RX ADMIN — INSULIN GLARGINE 20 UNIT(S): 100 INJECTION, SOLUTION SUBCUTANEOUS at 21:11

## 2022-10-24 RX ADMIN — Medication 2: at 11:47

## 2022-10-24 RX ADMIN — LITHIUM CARBONATE 900 MILLIGRAM(S): 300 TABLET, EXTENDED RELEASE ORAL at 21:12

## 2022-10-24 RX ADMIN — Medication 1: at 16:22

## 2022-10-24 RX ADMIN — Medication 650 MILLIGRAM(S): at 08:19

## 2022-10-24 RX ADMIN — DIVALPROEX SODIUM 500 MILLIGRAM(S): 500 TABLET, DELAYED RELEASE ORAL at 21:12

## 2022-10-24 RX ADMIN — TAMSULOSIN HYDROCHLORIDE 0.4 MILLIGRAM(S): 0.4 CAPSULE ORAL at 21:13

## 2022-10-24 RX ADMIN — QUETIAPINE FUMARATE 450 MILLIGRAM(S): 200 TABLET, FILM COATED ORAL at 21:13

## 2022-10-24 NOTE — BH INPATIENT PSYCHIATRY PROGRESS NOTE - NSBHASSESSSUMMFT_PSY_ALL_CORE
Patient is a 67 year man currently residing at [facility]. PPH Bipolar 1 Disorder. Hx of many inpatient admissions- last as few years ago at [place] per brother. Hx of multiple past suicide attempts- none recent. + history of aggressive behavior in the context of non compliance with medication. No legal issues or substance use issues. PMH- DM2 (insulin dependent), constipation, generalized muscle weakness, BPH. BIBA referred by SNF for agitation, admitted at [facility] initially and found to be Covid+ on 10/11, now admitted at [facility] for ongoing management of daryl.  Presents with grandiosity, elevated/irritable mood, FOI.   On exam, continues to have grandiose delusions, elevated mood, pressured speech, and flight of ideas, consistent with daryl. No SI/HI. No auditory or visual hallucinations. Remains grandiose despite 0.7 lithium level on current dose and Seroquel 450mg.  Given good behavioral control, will allow more time on med before increasing Li dose or adding VPA.  No signs of Li toxicity on exam.     10/24  Patient disorganized, irritable, labile over the weekend  Patient refusing to cooperate with medication changes. Starting Depakote was discussed.     Plan:    Admit 9.27 status, no CO 1:1 given no SI despite h/o SAs  Routine checks  Continue with current regimen from [facility] CL:  900mg Lithobid qHS thereafter  melatonin. 3 milliGRAM(s) Oral at bedtime  QUEtiapine 450 milliGRAM(s) Oral at bedtime  Will start depakote  mg given persistence of daryl on current regimen.  DM2: Lantus 20mg qHS; ISS  medical medications: sodium bicarbonate 650 milliGRAM(s) Oral two times a day; tamsulosin 0.4 milliGRAM(s) Oral at bedtime; aspirin enteric coated 81 milliGRAM(s) Oral daily  Group and milieu therapy   Patient is a 67 year man currently residing at [facility]. PPH Bipolar 1 Disorder. Hx of many inpatient admissions- last as few years ago at [place] per brother. Hx of multiple past suicide attempts- none recent. + history of aggressive behavior in the context of non compliance with medication. No legal issues or substance use issues. PMH- DM2 (insulin dependent), constipation, generalized muscle weakness, BPH. BIBA referred by SNF for agitation, admitted at [facility] initially and found to be Covid+ on 10/11, now admitted at [facility] for ongoing management of daryl.  Presents with grandiosity, elevated/irritable mood, FOI.   On exam, continues to have grandiose delusions, elevated mood, pressured speech, and flight of ideas, consistent with daryl. No SI/HI. No auditory or visual hallucinations. Remains grandiose despite 0.7 lithium level on current dose and Seroquel 450mg.  Given good behavioral control, will allow more time on med before increasing Li dose or adding VPA.  No signs of Li toxicity on exam.     Patient disorganized, irritable, labile over the weekend  Patient refusing to cooperate with medication changes. Starting Depakote was discussed.     Plan:    Admit 9.27 status, no CO 1:1 given no SI despite h/o SAs  Routine checks  Continue with current regimen from [facility] CL:  900mg Lithobid qHS thereafter  melatonin. 3 milliGRAM(s) Oral at bedtime  QUEtiapine 450 milliGRAM(s) Oral at bedtime  Will start depakote  mg given persistence of daryl on current regimen.  DM2: Lantus 20mg qHS; ISS  medical medications: sodium bicarbonate 650 milliGRAM(s) Oral two times a day; tamsulosin 0.4 milliGRAM(s) Oral at bedtime; aspirin enteric coated 81 milliGRAM(s) Oral daily  Group and milieu therapy
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
## 2022-10-24 NOTE — BH INPATIENT PSYCHIATRY PROGRESS NOTE - NSBHMETABOLIC_PSY_ALL_CORE_FT
BMI: BMI (kg/m2): 32.9 (10-19-22 @ 16:32)  HbA1c: A1C with Estimated Average Glucose Result: 5.7 % (10-08-22 @ 10:20)    Glucose: POCT Blood Glucose.: 204 mg/dL (10-24-22 @ 11:21)    BP: --  Lipid Panel: Date/Time: 10-08-22 @ 10:20  Cholesterol, Serum: 151  Direct LDL: --  HDL Cholesterol, Serum: 54  Total Cholesterol/HDL Ration Measurement: --  Triglycerides, Serum: 73

## 2022-10-24 NOTE — BH INPATIENT PSYCHIATRY PROGRESS NOTE - MSE UNSTRUCTURED FT
Appearance: Fair hygiene, Dressed appropriately.  Behavior: Superficially cooperative.    Motor: No abnormal movements.  No Psychomotor agitation or retardation noted  Speech: limited content, normal rate   Mood: "okay"  Affect: irritable, full, reactive   Thought Process: Linear.  Associations: Fair.  Thought Content: Normal. No delusions. No AVH, SIIP, HIIP.  Insight: Fair  Judgment: Fair   Attention: Fair.  Language: Fluent.  Gait/station: normal    Appearance: Fair hygiene, Dressed appropriately.  Behavior: Superficially cooperative.    Motor: No abnormal movements.  No Psychomotor agitation or retardation noted  Speech: pressured, normal rate   Mood: "okay"  Affect: irritable, reactive   Thought Process: Tangential, FOI  Associations: loose  Thought Content: Grandiosity. No SIIP, HIIP.  No perceptual disturbances  Insight: Fair  Judgment: Fair   Attention: Fair.  Language: Fluent.  Gait/station: normal

## 2022-10-24 NOTE — BH INPATIENT PSYCHIATRY PROGRESS NOTE - NSBHATTESTCOMMENTATTENDFT_PSY_A_CORE
Remains irritable, with FOI.  Behavioral control fluctuates significantly, is good today.  States he will refuse VPA which has been recommended for daryl not responding to adequate doses of Li and Seroquel.

## 2022-10-25 LAB
GLUCOSE BLDC GLUCOMTR-MCNC: 157 MG/DL — HIGH (ref 70–99)
GLUCOSE BLDC GLUCOMTR-MCNC: 167 MG/DL — HIGH (ref 70–99)
GLUCOSE BLDC GLUCOMTR-MCNC: 168 MG/DL — HIGH (ref 70–99)
GLUCOSE BLDC GLUCOMTR-MCNC: 240 MG/DL — HIGH (ref 70–99)

## 2022-10-25 PROCEDURE — 99231 SBSQ HOSP IP/OBS SF/LOW 25: CPT

## 2022-10-25 RX ADMIN — Medication 650 MILLIGRAM(S): at 21:42

## 2022-10-25 RX ADMIN — Medication 2: at 12:03

## 2022-10-25 RX ADMIN — LITHIUM CARBONATE 900 MILLIGRAM(S): 300 TABLET, EXTENDED RELEASE ORAL at 21:42

## 2022-10-25 RX ADMIN — INSULIN GLARGINE 20 UNIT(S): 100 INJECTION, SOLUTION SUBCUTANEOUS at 21:39

## 2022-10-25 RX ADMIN — QUETIAPINE FUMARATE 450 MILLIGRAM(S): 200 TABLET, FILM COATED ORAL at 21:41

## 2022-10-25 RX ADMIN — Medication 3 MILLIGRAM(S): at 21:41

## 2022-10-25 RX ADMIN — TAMSULOSIN HYDROCHLORIDE 0.4 MILLIGRAM(S): 0.4 CAPSULE ORAL at 21:42

## 2022-10-25 RX ADMIN — Medication 81 MILLIGRAM(S): at 09:12

## 2022-10-25 RX ADMIN — Medication 650 MILLIGRAM(S): at 09:15

## 2022-10-25 RX ADMIN — Medication 1: at 16:18

## 2022-10-25 RX ADMIN — Medication 1: at 09:11

## 2022-10-25 RX ADMIN — DIVALPROEX SODIUM 500 MILLIGRAM(S): 500 TABLET, DELAYED RELEASE ORAL at 21:41

## 2022-10-25 RX ADMIN — POLYETHYLENE GLYCOL 3350 17 GRAM(S): 17 POWDER, FOR SOLUTION ORAL at 08:39

## 2022-10-25 NOTE — BH INPATIENT PSYCHIATRY PROGRESS NOTE - NSBHATTESTCOMMENTATTENDFT_PSY_A_CORE
Continues to be expansive and grandiose, inviting team to his [facility].  Per RN report, he tried to conceal medication in his pocket when given HS meds.  RN caught this and he then took medication.  However, pills were found in his room in a cup, and furtive noncompliance is likely.  Mouth checks ordered.

## 2022-10-25 NOTE — BH INPATIENT PSYCHIATRY PROGRESS NOTE - MSE UNSTRUCTURED FT
Appearance: Fair hygiene, Dressed appropriately.  Behavior: Superficially cooperative.    Motor: No abnormal movements.  No Psychomotor agitation or retardation noted  Speech: pressured, normal rate   Mood: "I feel good"  Affect: irritable, reactive   Thought Process: Tangential, FOI  Associations: loose  Thought Content: Grandiosity. No SIIP, HIIP.  No perceptual disturbances  Insight: Fair  Judgment: Fair   Attention: Fair.  Language: Fluent.  Gait/station: normal    Appearance: appears stated age, Fair hygiene, Dressed appropriately.  Behavior:  cooperative with interview questions, fair eye contact throughout the interview  Motor: No abnormal movements.  No Psychomotor agitation or retardation noted  Speech: pressured, normal tone, fluent speech   Mood: "I feel good"  Affect: elevated, congruent with stated mood, stable, full range   Thought Process: preoccupation with safety from Mob[employer], Tangential, FOI  Associations: loose  Thought Content: Grandiose delusions. Paranoid delusions No SIIP, HIIP.  No perceptual disturbances  Insight: Fair  Judgment: Fair   Attention: Fair.  Gait/station: normal    Appearance: appears stated age, Fair hygiene, Dressed appropriately.  Behavior:  cooperative with interview questions, fair eye contact throughout the interview  Motor: No abnormal movements.  No Psychomotor agitation or retardation noted  Speech: pressured, normal tone, fluent speech   Mood: "I feel good"  Affect: elevated, congruent with stated mood, stable, full range   Thought Process: preoccupation with safety, Tangential, FOI  Associations: loose  Thought Content: Grandiose delusions. Paranoid delusions of mobsters. No SIIP, HIIP.  No perceptual disturbances  Insight: Fair  Judgment: Fair   Attention: Fair.  Gait/station: normal

## 2022-10-25 NOTE — BH INPATIENT PSYCHIATRY PROGRESS NOTE - NSBHMETABOLIC_PSY_ALL_CORE_FT
BMI: BMI (kg/m2): 32.9 (10-19-22 @ 16:32)  HbA1c: A1C with Estimated Average Glucose Result: 5.7 % (10-08-22 @ 10:20)    Glucose: POCT Blood Glucose.: 157 mg/dL (10-25-22 @ 07:30)    BP: --  Lipid Panel: Date/Time: 10-08-22 @ 10:20  Cholesterol, Serum: 151  Direct LDL: --  HDL Cholesterol, Serum: 54  Total Cholesterol/HDL Ration Measurement: --  Triglycerides, Serum: 73   BMI: BMI (kg/m2): 32.9 (10-19-22 @ 16:32)  HbA1c: A1C with Estimated Average Glucose Result: 5.7 % (10-08-22 @ 10:20)    Glucose: POCT Blood Glucose.: 240 mg/dL (10-25-22 @ 11:04)    BP: --  Lipid Panel: Date/Time: 10-08-22 @ 10:20  Cholesterol, Serum: 151  Direct LDL: --  HDL Cholesterol, Serum: 54  Total Cholesterol/HDL Ration Measurement: --  Triglycerides, Serum: 73

## 2022-10-25 NOTE — BH INPATIENT PSYCHIATRY PROGRESS NOTE - NSBHASSESSSUMMFT_PSY_ALL_CORE
Patient is a 67 year man currently residing at [facility]. PPH Bipolar 1 Disorder. Hx of many inpatient admissions- last as few years ago at [place] per brother. Hx of multiple past suicide attempts- none recent. + history of aggressive behavior in the context of non compliance with medication. No legal issues or substance use issues. PMH- DM2 (insulin dependent), constipation, generalized muscle weakness, BPH. BIBA referred by SNF for agitation, admitted at [facility] initially and found to be Covid+ on 10/11, now admitted at [facility] for ongoing management of daryl.  Presents with grandiosity, elevated/irritable mood, FOI.   On exam, continues to have grandiose delusions, elevated mood, pressured speech, and flight of ideas, consistent with daryl. No SI/HI. No auditory or visual hallucinations. Remains grandiose despite 0.7 lithium level on current dose and Seroquel 450mg.  Given good behavioral control, will allow more time on med before increasing Li dose or adding VPA.  No signs of Li toxicity on exam.     Patient disorganized, irritable, labile over the weekend  Patient refusing to cooperate with medication changes. Starting Depakote was discussed.     Plan:    Admit 9.27 status, no CO 1:1 given no SI despite h/o SAs  Routine checks  Continue with current regimen from [facility] CL:  900mg Lithobid qHS thereafter  melatonin. 3 milliGRAM(s) Oral at bedtime  QUEtiapine 450 milliGRAM(s) Oral at bedtime  Will start depakote  mg given persistence of daryl on current regimen.  DM2: Lantus 20mg qHS; ISS  medical medications: sodium bicarbonate 650 milliGRAM(s) Oral two times a day; tamsulosin 0.4 milliGRAM(s) Oral at bedtime; aspirin enteric coated 81 milliGRAM(s) Oral daily  Group and milieu therapy   Patient is a 67 year man currently residing at [facility]. PPH Bipolar 1 Disorder. Hx of many inpatient admissions- last as few years ago at [place] per brother. Hx of multiple past suicide attempts- none recent. + history of aggressive behavior in the context of non compliance with medication. No legal issues or substance use issues. PMH- DM2 (insulin dependent), constipation, generalized muscle weakness, BPH. BIBA referred by SNF for agitation, admitted at [facility] initially and found to be Covid+ on 10/11, now admitted at [facility] for ongoing management of daryl.  Presents with grandiosity, elevated/irritable mood, FOI.   On exam, continues to have grandiose delusions, elevated mood, pressured speech, and flight of ideas, consistent with daryl. No SI/HI. No auditory or visual hallucinations. Remains grandiose despite 0.7 lithium level on current dose and Seroquel 450mg.  Given good behavioral control, will allow more time on med before increasing Li dose or adding VPA.  No signs of Li toxicity on exam.     On exam patient is less irritable but mood remains elevated. He has grandiose and paranoid delusions.       Plan:    Admit 9.27 status, no CO 1:1 given no SI despite h/o SAs  Routine checks  Continue with current regimen from [facility] CL:  900mg Lithobid qHS thereafter  melatonin. 3 milliGRAM(s) Oral at bedtime  QUEtiapine 450 milliGRAM(s) Oral at bedtime  Will start depakote  mg given persistence of daryl on current regimen.  DM2: Lantus 20mg qHS; ISS  medical medications: sodium bicarbonate 650 milliGRAM(s) Oral two times a day; tamsulosin 0.4 milliGRAM(s) Oral at bedtime; aspirin enteric coated 81 milliGRAM(s) Oral daily  Group and milieu therapy   Patient is a 67 year man currently residing at [facility]. PPH Bipolar 1 Disorder. Hx of many inpatient admissions- last as few years ago at [place] per brother. Hx of multiple past suicide attempts- none recent. + history of aggressive behavior in the context of non compliance with medication. No legal issues or substance use issues. PMH- DM2 (insulin dependent), constipation, generalized muscle weakness, BPH. BIBA referred by SNF for agitation, admitted at [facility] initially and found to be Covid+ on 10/11, now admitted at [facility] for ongoing management of daryl.  Presents with grandiosity, elevated/irritable mood, FOI.   On exam, continues to have grandiose delusions, elevated mood, pressured speech, and flight of ideas, consistent with daryl. No SI/HI. No auditory or visual hallucinations. Remains grandiose despite 0.7 lithium level on current dose and Seroquel 450mg.  Given good behavioral control, will allow more time on med before increasing Li dose or adding VPA.  No signs of Li toxicity on exam.     On exam patient is less irritable but mood remains elevated. He has grandiose and paranoid delusions. He has been furtively noncompliant with VPA and possibly part of Seroquel dose.     Plan:    Admit 9.27 status, no CO 1:1 given no SI despite h/o SAs  Routine checks  Continue with current regimen from [facility] CL:  900mg Lithobid qHS thereafter  melatonin. 3 milliGRAM(s) Oral at bedtime  QUEtiapine 450 milliGRAM(s) Oral at bedtime  Will start depakote  mg given persistence of daryl on current regimen.  DM2: Lantus 20mg qHS; ISS  medical medications: sodium bicarbonate 650 milliGRAM(s) Oral two times a day; tamsulosin 0.4 milliGRAM(s) Oral at bedtime; aspirin enteric coated 81 milliGRAM(s) Oral daily  Group and milieu therapy

## 2022-10-25 NOTE — BH INPATIENT PSYCHIATRY PROGRESS NOTE - NSBHFUPINTERVALHXFT_PSY_A_CORE
Patient seen for follow up of daryl with psychosis. Chart reviewed and case discussed with nursing staff.        Patient seen for follow up of daryl with psychosis. Chart reviewed and case discussed with nursing staff. Per nurse, patient seen attempting to pocket medication       Chart reviewed and case discussed with interdisciplinary team. Pt reports he was adherent with Depakote yesterday but nursing staff report that they saw him attempting to pocket medication. Housekeeping staff found his medication in a cup, per nursing. Pt states he "feels good" today and wants to stay in the unit until [place] because he is concerned about his safety. He endorses paranoid delusion that there are mobsters in [facility] that are making the area unsafe. He continues to endorse grandiose delusion that he is  to a woman named Evelyn who he met in [place] and now they own a house together in the [facility].        Chart reviewed and case discussed with interdisciplinary team. Pt reports he was adherent with Depakote yesterday but nursing staff report that they saw him attempting to pocket medication. Housekeeping staff found his medication in a cup, per nursing, and showed us the remnants of the pills. Pt states he "feels good" today and wants to stay in the unit until [place] because he is concerned about his safety. He endorses paranoid delusion that there are mobsters in [facility] that are making the area unsafe. He continues to endorse grandiose delusion that he is  to a woman named Evelyn who he met in [place] and now they own a house together in the [facility]. He invites the team to stay at the house sometime.

## 2022-10-26 LAB
GLUCOSE BLDC GLUCOMTR-MCNC: 142 MG/DL — HIGH (ref 70–99)
GLUCOSE BLDC GLUCOMTR-MCNC: 171 MG/DL — HIGH (ref 70–99)
GLUCOSE BLDC GLUCOMTR-MCNC: 206 MG/DL — HIGH (ref 70–99)
GLUCOSE BLDC GLUCOMTR-MCNC: 214 MG/DL — HIGH (ref 70–99)

## 2022-10-26 PROCEDURE — 99231 SBSQ HOSP IP/OBS SF/LOW 25: CPT | Mod: GC

## 2022-10-26 RX ADMIN — Medication 650 MILLIGRAM(S): at 08:28

## 2022-10-26 RX ADMIN — Medication 650 MILLIGRAM(S): at 20:41

## 2022-10-26 RX ADMIN — Medication 3 MILLIGRAM(S): at 20:40

## 2022-10-26 RX ADMIN — TAMSULOSIN HYDROCHLORIDE 0.4 MILLIGRAM(S): 0.4 CAPSULE ORAL at 20:39

## 2022-10-26 RX ADMIN — Medication 2: at 16:24

## 2022-10-26 RX ADMIN — Medication 81 MILLIGRAM(S): at 08:28

## 2022-10-26 RX ADMIN — DIVALPROEX SODIUM 500 MILLIGRAM(S): 500 TABLET, DELAYED RELEASE ORAL at 20:47

## 2022-10-26 RX ADMIN — INSULIN GLARGINE 20 UNIT(S): 100 INJECTION, SOLUTION SUBCUTANEOUS at 21:21

## 2022-10-26 RX ADMIN — QUETIAPINE FUMARATE 450 MILLIGRAM(S): 200 TABLET, FILM COATED ORAL at 20:41

## 2022-10-26 RX ADMIN — Medication 1: at 12:10

## 2022-10-26 RX ADMIN — LITHIUM CARBONATE 900 MILLIGRAM(S): 300 TABLET, EXTENDED RELEASE ORAL at 20:40

## 2022-10-26 NOTE — BH INPATIENT PSYCHIATRY PROGRESS NOTE - NSBHMETABOLIC_PSY_ALL_CORE_FT
BMI: BMI (kg/m2): 32.9 (10-19-22 @ 16:32)  HbA1c: A1C with Estimated Average Glucose Result: 5.7 % (10-08-22 @ 10:20)    Glucose: POCT Blood Glucose.: 142 mg/dL (10-26-22 @ 07:58)    BP: --  Lipid Panel: Date/Time: 10-08-22 @ 10:20  Cholesterol, Serum: 151  Direct LDL: --  HDL Cholesterol, Serum: 54  Total Cholesterol/HDL Ration Measurement: --  Triglycerides, Serum: 73

## 2022-10-26 NOTE — BH INPATIENT PSYCHIATRY PROGRESS NOTE - NSBHATTESTCOMMENTATTENDFT_PSY_A_CORE
As above.  Unclear if medications found in room today are from previous day of noncompliance or from last night as nurse carefully observed patient take medication and performed thorough mouth check last night.  Patient denies not taking medication last night.  Will perform VPA level tomorrow to assess compliance.  Patient is calmer today.

## 2022-10-26 NOTE — BH INPATIENT PSYCHIATRY PROGRESS NOTE - MSE UNSTRUCTURED FT
Appearance: appears stated age, Fair hygiene, Dressed appropriately.  Behavior: superficially cooperative with interview questions, fair eye contact throughout the interview  Motor: No abnormal movements.  No Psychomotor agitation or retardation noted  Speech: normal tone, rate, and fluency of speech  Mood: "I feel good"  Affect: elevated, irritable, full range   Thought Process: Tangential, incoherent at times  Associations: loose  Thought Content: Grandiose delusions. No SIIP, HIIP.  No perceptual disturbances  Insight: Fair  Judgment: Fair   Attention: Fair.  Gait/station: normal

## 2022-10-26 NOTE — BH INPATIENT PSYCHIATRY PROGRESS NOTE - NSBHFUPINTERVALHXFT_PSY_A_CORE
Chart reviewed and case discussed with interdisciplinary team. Per staff, patient peed all over his room overnight. Pills found in his room multiple times. On interview, patient reports that he's feeling "fine". When asked why he has been pocketing and hiding medications, patient became irritable, stating that he's been good, and will take the medication. Patient refused to talk to female provider, and later stated that there's a big problem with women in psychiatry. Patient wasn't able to elaborate on what he meant by a problem with women in psychiatry. He continues to endorse grandiose thoughts including having a wife who lives in the [facility].          Chart reviewed and case discussed with interdisciplinary team. Per staff, patient peed all over his room overnight. Pills found in his room again this morning. On interview, patient reports that he's feeling "fine". When asked why he has been pocketing and hiding medications, patient became irritable, stating that he's been good, and will take the medication--"its in the past".  Denies cheeking or otherwise not taking and disposing of medication last night. Patient refused to talk to female provider, and later stated that there's a big problem with women in psychiatry. Patient wasn't able to elaborate on what he meant by a problem with women in psychiatry. He continues to endorse grandiose thoughts including having a wife who lives in the [facility].

## 2022-10-27 DIAGNOSIS — N40.0 BENIGN PROSTATIC HYPERPLASIA WITHOUT LOWER URINARY TRACT SYMPTOMS: ICD-10-CM

## 2022-10-27 DIAGNOSIS — E11.9 TYPE 2 DIABETES MELLITUS WITHOUT COMPLICATIONS: ICD-10-CM

## 2022-10-27 LAB
GLUCOSE BLDC GLUCOMTR-MCNC: 116 MG/DL — HIGH (ref 70–99)
GLUCOSE BLDC GLUCOMTR-MCNC: 124 MG/DL — HIGH (ref 70–99)
GLUCOSE BLDC GLUCOMTR-MCNC: 139 MG/DL — HIGH (ref 70–99)
GLUCOSE BLDC GLUCOMTR-MCNC: 147 MG/DL — HIGH (ref 70–99)

## 2022-10-27 PROCEDURE — 99232 SBSQ HOSP IP/OBS MODERATE 35: CPT

## 2022-10-27 RX ADMIN — Medication 650 MILLIGRAM(S): at 09:02

## 2022-10-27 RX ADMIN — LITHIUM CARBONATE 900 MILLIGRAM(S): 300 TABLET, EXTENDED RELEASE ORAL at 22:32

## 2022-10-27 RX ADMIN — Medication 650 MILLIGRAM(S): at 22:32

## 2022-10-27 RX ADMIN — Medication 3 MILLIGRAM(S): at 22:33

## 2022-10-27 RX ADMIN — QUETIAPINE FUMARATE 450 MILLIGRAM(S): 200 TABLET, FILM COATED ORAL at 22:32

## 2022-10-27 RX ADMIN — DIVALPROEX SODIUM 500 MILLIGRAM(S): 500 TABLET, DELAYED RELEASE ORAL at 22:33

## 2022-10-27 RX ADMIN — Medication 81 MILLIGRAM(S): at 09:02

## 2022-10-27 RX ADMIN — TAMSULOSIN HYDROCHLORIDE 0.4 MILLIGRAM(S): 0.4 CAPSULE ORAL at 22:33

## 2022-10-27 RX ADMIN — INSULIN GLARGINE 20 UNIT(S): 100 INJECTION, SOLUTION SUBCUTANEOUS at 22:34

## 2022-10-27 NOTE — BH INPATIENT PSYCHIATRY PROGRESS NOTE - NSBHASSESSSUMMFT_PSY_ALL_CORE
67-year-old man currently residing at [facility]. PPH of Bipolar 1 Disorder. Hx of many inpatient admissions- last as few years ago at [place] per brother. Hx of multiple past suicide attempts- none recent. + history of aggressive behavior in the context of non compliance with medication. No legal issues or substance use issues. PMH- DM2 (insulin dependent), constipation, generalized muscle weakness, BPH. BIBA referred by SNF for agitation, admitted at [facility] initially and found to be Covid+ on 10/11, now admitted at [facility] for ongoing management of daryl.  Presents with grandiosity, elevated/irritable mood, FOI.   On exam, continues to have grandiose delusions, elevated mood, pressured speech, and flight of ideas, consistent with daryl. No SI/HI. No auditory or visual hallucinations. Remains grandiose despite 0.7 lithium level on current dose and Seroquel 450mg.  Given good behavioral control, will allow more time on med before increasing Li dose or adding VPA.  No signs of Li toxicity on exam.     10/27 Clinical Update  Patient transferred to [facility] from  last evening. On exam, patient is grandiose with elevated mood. Staff report concern that patient might be cheeking his meds.     Plan:  - Will continue Lithobid 900mg qHS for daryl as well as Depakote  mg given persistence of daryl   - Will continue melatonin 3mg Oral at bedtime  - Will continue quetiapine 450mg at bedtime  - Will continue pocket and mouth checks for medication  - Will continue medications for medical comorbidities-- COVID-19: Asymptomatic, will continue to monitor; DM2: Lantus 20mg qHS; ISS: sodium bicarbonate 650mg BID; BPH: tamsulosin 0.4mg at bedtime; aspirin enteric coated 81 milliGRAM(s) Oral daily  - Dispo: discharge back to [facility] when stable

## 2022-10-27 NOTE — BH INPATIENT PSYCHIATRY PROGRESS NOTE - NSBHMETABOLIC_PSY_ALL_CORE_FT
BMI: BMI (kg/m2): 32.9 (10-19-22 @ 16:32)  HbA1c: A1C with Estimated Average Glucose Result: 5.7 % (10-08-22 @ 10:20)  Glucose: POCT Blood Glucose.: 147 mg/dL (10-27-22 @ 12:22)  Lipid Panel: Date/Time: 10-08-22 @ 10:20  Cholesterol, Serum: 151  HDL Cholesterol, Serum: 54  Triglycerides, Serum: 73

## 2022-10-27 NOTE — BH INPATIENT PSYCHIATRY PROGRESS NOTE - NSBHFUPINTERVALHXFT_PSY_A_CORE
Patient seen for follow-up for daryl. Chart reviewed and case discussed with interdisciplinary team. Per staff, patient is irritable and agitated. On interview, patient reports that he's feeling "great" and requests that the writer get in touch with his brother so that he can get out of here. 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 Patient denies any pain or discomfort. His vitals are stable.

## 2022-10-27 NOTE — BH INPATIENT PSYCHIATRY PROGRESS NOTE - MSE UNSTRUCTURED FT
Appearance: appears stated age, Fair hygiene, Dressed appropriately.  Behavior: superficially cooperative with interview questions, fair eye contact throughout the interview  Motor: No abnormal movements.  No Psychomotor agitation or retardation noted  Speech: normal tone, rate, and fluency of speech  Mood: "great"  Affect: elevated  Thought Process: Tangential, incoherent at times  Associations: loose  Thought Content: Grandiose delusions. No SIIP, HIIP.  No perceptual disturbances  Insight: Partial  Judgment: Poor  Attention: Fair  Gait/station: normal

## 2022-10-28 LAB
GLUCOSE BLDC GLUCOMTR-MCNC: 124 MG/DL — HIGH (ref 70–99)
GLUCOSE BLDC GLUCOMTR-MCNC: 130 MG/DL — HIGH (ref 70–99)
GLUCOSE BLDC GLUCOMTR-MCNC: 179 MG/DL — HIGH (ref 70–99)
GLUCOSE BLDC GLUCOMTR-MCNC: 96 MG/DL — SIGNIFICANT CHANGE UP (ref 70–99)

## 2022-10-28 PROCEDURE — 99232 SBSQ HOSP IP/OBS MODERATE 35: CPT

## 2022-10-28 RX ORDER — RISPERIDONE 4 MG/1
0.5 TABLET ORAL AT BEDTIME
Refills: 0 | Status: DISCONTINUED | OUTPATIENT
Start: 2022-10-28 | End: 2022-10-30

## 2022-10-28 RX ADMIN — QUETIAPINE FUMARATE 450 MILLIGRAM(S): 200 TABLET, FILM COATED ORAL at 21:27

## 2022-10-28 RX ADMIN — RISPERIDONE 0.5 MILLIGRAM(S): 4 TABLET ORAL at 21:27

## 2022-10-28 RX ADMIN — Medication 81 MILLIGRAM(S): at 09:28

## 2022-10-28 RX ADMIN — TAMSULOSIN HYDROCHLORIDE 0.4 MILLIGRAM(S): 0.4 CAPSULE ORAL at 21:27

## 2022-10-28 RX ADMIN — Medication 650 MILLIGRAM(S): at 09:27

## 2022-10-28 RX ADMIN — LITHIUM CARBONATE 900 MILLIGRAM(S): 300 TABLET, EXTENDED RELEASE ORAL at 21:26

## 2022-10-28 RX ADMIN — DIVALPROEX SODIUM 500 MILLIGRAM(S): 500 TABLET, DELAYED RELEASE ORAL at 21:26

## 2022-10-28 RX ADMIN — INSULIN GLARGINE 20 UNIT(S): 100 INJECTION, SOLUTION SUBCUTANEOUS at 21:26

## 2022-10-28 RX ADMIN — Medication 1: at 12:22

## 2022-10-28 RX ADMIN — Medication 3 MILLIGRAM(S): at 21:27

## 2022-10-28 NOTE — BH INPATIENT PSYCHIATRY PROGRESS NOTE - NSBHASSESSSUMMFT_PSY_ALL_CORE
67-year-old man currently residing at [facility]. PPH of Bipolar 1 Disorder. Hx of several inpatient admissions- last as few years ago at [place] per brother. Hx of multiple past suicide attempts- none recent. + history of aggressive behavior in the context of noncompliance with medication. PMH- DM2 (insulin dependent), constipation, generalized muscle weakness, BPH. Patient BIBA referred by SNF for agitation, admitted at [facility] initially and found to be Covid+ on 10/11, now admitted at [facility] for ongoing management of daryl.  Presents with grandiosity, elevated/irritable mood, FOI.     Patient transferred to [facility] from  on 10/26. VPA added to existing regimen while at .     10/28 Clinical Update  On exam, patient is irritable, grandiose, with elevated mood and pressured speech. Patient has been refusing blood work. He is taking his meds but it is possible that he might be cheeking his meds.    Plan:  -Will initiate risperidone 0.5mg at bedtime given poor response to ongoing meds and given h/o poor med compliance. The goal is to administer WATTS if clinical improvement noted.    - Will continue Lithobid 900mg qHS for daryl, but taper and d/c VPA  - Will continue melatonin 3mg Oral at bedtime  - Will continue quetiapine 450mg at bedtime  - Will continue pocket and mouth checks for medication  - Will continue medications for medical comorbidities-- COVID-19: Asymptomatic, will continue to monitor; DM2: Lantus 20mg qHS; ISS: sodium bicarbonate 650mg BID; BPH: tamsulosin 0.4mg at bedtime; aspirin enteric coated 81 milliGRAM(s) Oral daily  - Dispo: discharge back to [facility] when stable

## 2022-10-28 NOTE — BH INPATIENT PSYCHIATRY PROGRESS NOTE - MSE UNSTRUCTURED FT
Patient appears stated age, is overweight, dressed in hospital gowns and PJs. Superficially cooperative with interview questions with fair eye contact. No abnormal movements. No Psychomotor agitation or retardation noted. Steady gait observed. Speech is loud and pressured. Reported mood "perfect" with elevated affect. Thought Process is  tangential, incoherent at times, with loose associations. 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 No perceptual disturbances. Insight and judgement are poor. Impulse control tenuous at the time fo exam. Oriented X3

## 2022-10-28 NOTE — BH INPATIENT PSYCHIATRY PROGRESS NOTE - NSBHFUPINTERVALHXFT_PSY_A_CORE
Patient seen for follow-up for daryl. Chart reviewed and case discussed with interdisciplinary team. Per staff, patient is irritable and agitated and has been refusing blood work. On exam, patient noted to have elevated affect with loud pressured speech. Patient reports that he's feeling "perfect" and needs to get out of here. When writer encourages him to get his labs done, patient gets louder and states "only girls get their blood tested". Patient is unable to engage meaningfully any further. His appetite is adequate and his sleep is improving. He remains med compliant but known to cheek his meds. His vitals are stable.

## 2022-10-28 NOTE — BH INPATIENT PSYCHIATRY PROGRESS NOTE - NSBHMETABOLIC_PSY_ALL_CORE_FT
BMI: BMI (kg/m2): 32.9 (10-19-22 @ 16:32)  HbA1c: A1C with Estimated Average Glucose Result: 5.7 % (10-08-22 @ 10:20)  Glucose: POCT Blood Glucose.: 179 mg/dL (10-28-22 @ 12:02)  Lipid Panel: Date/Time: 10-08-22 @ 10:20  Cholesterol, Serum: 151  HDL Cholesterol, Serum: 54  Triglycerides, Serum: 73

## 2022-10-29 LAB
GLUCOSE BLDC GLUCOMTR-MCNC: 112 MG/DL — HIGH (ref 70–99)
GLUCOSE BLDC GLUCOMTR-MCNC: 114 MG/DL — HIGH (ref 70–99)
GLUCOSE BLDC GLUCOMTR-MCNC: 138 MG/DL — HIGH (ref 70–99)
GLUCOSE BLDC GLUCOMTR-MCNC: 153 MG/DL — HIGH (ref 70–99)
GLUCOSE BLDC GLUCOMTR-MCNC: 96 MG/DL — SIGNIFICANT CHANGE UP (ref 70–99)

## 2022-10-29 PROCEDURE — 99231 SBSQ HOSP IP/OBS SF/LOW 25: CPT

## 2022-10-29 RX ADMIN — LITHIUM CARBONATE 900 MILLIGRAM(S): 300 TABLET, EXTENDED RELEASE ORAL at 21:26

## 2022-10-29 RX ADMIN — QUETIAPINE FUMARATE 450 MILLIGRAM(S): 200 TABLET, FILM COATED ORAL at 21:26

## 2022-10-29 RX ADMIN — RISPERIDONE 0.5 MILLIGRAM(S): 4 TABLET ORAL at 21:27

## 2022-10-29 RX ADMIN — DIVALPROEX SODIUM 500 MILLIGRAM(S): 500 TABLET, DELAYED RELEASE ORAL at 21:26

## 2022-10-29 RX ADMIN — Medication 81 MILLIGRAM(S): at 09:11

## 2022-10-29 RX ADMIN — QUETIAPINE FUMARATE 25 MILLIGRAM(S): 200 TABLET, FILM COATED ORAL at 09:12

## 2022-10-29 RX ADMIN — Medication 650 MILLIGRAM(S): at 09:11

## 2022-10-29 RX ADMIN — TAMSULOSIN HYDROCHLORIDE 0.4 MILLIGRAM(S): 0.4 CAPSULE ORAL at 21:26

## 2022-10-29 RX ADMIN — Medication 3 MILLIGRAM(S): at 21:26

## 2022-10-29 RX ADMIN — INSULIN GLARGINE 20 UNIT(S): 100 INJECTION, SOLUTION SUBCUTANEOUS at 21:25

## 2022-10-29 NOTE — BH INPATIENT PSYCHIATRY PROGRESS NOTE - MSE UNSTRUCTURED FT
Patient is awake and alert. Affect is irritable, intense. Speech is fluent, over-productive. TP is concrete but coherent. Somewhat paranoid about  hospital staff. No perceptual disturbance reported. No tremor. No suicidal ideations. Poor insight.

## 2022-10-29 NOTE — BH INPATIENT PSYCHIATRY PROGRESS NOTE - NSBHFUPINTERVALHXFT_PSY_A_CORE
Patient seen for follow-up for daryl. He remains intrusive with intense affect, intermittent agitation. AVSS

## 2022-10-29 NOTE — BH INPATIENT PSYCHIATRY PROGRESS NOTE - NSBHMETABOLIC_PSY_ALL_CORE_FT
BMI: BMI (kg/m2): 32.9 (10-19-22 @ 16:32)  HbA1c: A1C with Estimated Average Glucose Result: 5.7 % (10-08-22 @ 10:20)    Glucose: POCT Blood Glucose.: 114 mg/dL (10-29-22 @ 08:03)    BP: --  Lipid Panel: Date/Time: 10-08-22 @ 10:20  Cholesterol, Serum: 151  Direct LDL: --  HDL Cholesterol, Serum: 54  Total Cholesterol/HDL Ration Measurement: --  Triglycerides, Serum: 73

## 2022-10-29 NOTE — BH INPATIENT PSYCHIATRY PROGRESS NOTE - NSBHASSESSSUMMFT_PSY_ALL_CORE
67-year-old man currently residing at [facility]. PPH of Bipolar 1 Disorder. Hx of several inpatient admissions- last as few years ago at [place] per brother. Hx of multiple past suicide attempts- none recent. + history of aggressive behavior in the context of noncompliance with medication. PMH- DM2 (insulin dependent), constipation, generalized muscle weakness, BPH. Patient BIBA referred by SNF for agitation, admitted at [facility] initially and found to be Covid+ on 10/11, now admitted at [facility] for ongoing management of daryl.  Presents with grandiosity, elevated/irritable mood, FOI.     Patient transferred to [facility] from  on 10/26. VPA added to existing regimen while at .     10/28 Clinical Update  On exam, patient is irritable, grandiose, with elevated mood and pressured speech. Patient has been refusing blood work. He is taking his meds but it is possible that he might be cheeking his meds.  10/29   Patient remains expansive, intermittently agitated, poor insight    Plan:  -Will initiate risperidone 0.5mg at bedtime given poor response to ongoing meds and given h/o poor med compliance. The goal is to administer WATTS if clinical improvement noted.    - Will continue Lithobid 900mg qHS for daryl, but taper and d/c VPA  - Will continue melatonin 3mg Oral at bedtime  - Will continue quetiapine 450mg at bedtime  - Will continue pocket and mouth checks for medication  - Will continue medications for medical comorbidities-- COVID-19: Asymptomatic, will continue to monitor; DM2: Lantus 20mg qHS; ISS: sodium bicarbonate 650mg BID; BPH: tamsulosin 0.4mg at bedtime; aspirin enteric coated 81 milliGRAM(s) Oral daily  - Dispo: discharge back to [facility] when stable

## 2022-10-30 LAB
GLUCOSE BLDC GLUCOMTR-MCNC: 106 MG/DL — HIGH (ref 70–99)
GLUCOSE BLDC GLUCOMTR-MCNC: 130 MG/DL — HIGH (ref 70–99)
GLUCOSE BLDC GLUCOMTR-MCNC: 152 MG/DL — HIGH (ref 70–99)
GLUCOSE BLDC GLUCOMTR-MCNC: 83 MG/DL — SIGNIFICANT CHANGE UP (ref 70–99)

## 2022-10-30 PROCEDURE — 99231 SBSQ HOSP IP/OBS SF/LOW 25: CPT

## 2022-10-30 RX ORDER — RISPERIDONE 4 MG/1
1 TABLET ORAL AT BEDTIME
Refills: 0 | Status: DISCONTINUED | OUTPATIENT
Start: 2022-10-30 | End: 2022-10-31

## 2022-10-30 RX ORDER — INFLUENZA VIRUS VACCINE 15; 15; 15; 15 UG/.5ML; UG/.5ML; UG/.5ML; UG/.5ML
0.7 SUSPENSION INTRAMUSCULAR ONCE
Refills: 0 | Status: DISCONTINUED | OUTPATIENT
Start: 2022-10-30 | End: 2022-11-11

## 2022-10-30 RX ADMIN — RISPERIDONE 1 MILLIGRAM(S): 4 TABLET ORAL at 21:45

## 2022-10-30 RX ADMIN — TAMSULOSIN HYDROCHLORIDE 0.4 MILLIGRAM(S): 0.4 CAPSULE ORAL at 21:23

## 2022-10-30 RX ADMIN — DIVALPROEX SODIUM 500 MILLIGRAM(S): 500 TABLET, DELAYED RELEASE ORAL at 21:24

## 2022-10-30 RX ADMIN — Medication 650 MILLIGRAM(S): at 21:24

## 2022-10-30 RX ADMIN — QUETIAPINE FUMARATE 450 MILLIGRAM(S): 200 TABLET, FILM COATED ORAL at 21:24

## 2022-10-30 RX ADMIN — Medication 1: at 12:31

## 2022-10-30 RX ADMIN — INSULIN GLARGINE 20 UNIT(S): 100 INJECTION, SOLUTION SUBCUTANEOUS at 21:23

## 2022-10-30 RX ADMIN — Medication 3 MILLIGRAM(S): at 21:24

## 2022-10-30 RX ADMIN — Medication 81 MILLIGRAM(S): at 09:47

## 2022-10-30 RX ADMIN — Medication 650 MILLIGRAM(S): at 09:46

## 2022-10-30 RX ADMIN — LITHIUM CARBONATE 900 MILLIGRAM(S): 300 TABLET, EXTENDED RELEASE ORAL at 21:23

## 2022-10-30 NOTE — BH INPATIENT PSYCHIATRY PROGRESS NOTE - NSBHASSESSSUMMFT_PSY_ALL_CORE
67-year-old man currently residing at [facility]. PPH of Bipolar 1 Disorder. Hx of several inpatient admissions- last as few years ago at [place] per brother. Hx of multiple past suicide attempts- none recent. + history of aggressive behavior in the context of noncompliance with medication. PMH- DM2 (insulin dependent), constipation, generalized muscle weakness, BPH. Patient BIBA referred by SNF for agitation, admitted at [facility] initially and found to be Covid+ on 10/11, now admitted at [facility] for ongoing management of daryl.  Presents with grandiosity, elevated/irritable mood, FOI.     Patient transferred to [facility] from  on 10/26. VPA added to existing regimen while at .     10/28 Clinical Update  On exam, patient is irritable, grandiose, with elevated mood and pressured speech. Patient has been refusing blood work. He is taking his meds but it is possible that he might be cheeking his meds.  10/29   Patient remains expansive, intermittently agitated, poor insight  10/30  Patient remains paranoid, tolerating risperidone without EPS, will increase to 1 mg hs    Plan:  -Will initiate risperidone 0.5mg at bedtime given poor response to ongoing meds and given h/o poor med compliance. The goal is to administer WATTS if clinical improvement noted.    - Will continue Lithobid 900mg qHS for daryl, but taper and d/c VPA  - Will continue melatonin 3mg Oral at bedtime  - Will continue quetiapine 450mg at bedtime  - Will continue pocket and mouth checks for medication  - Will continue medications for medical comorbidities-- COVID-19: Asymptomatic, will continue to monitor; DM2: Lantus 20mg qHS; ISS: sodium bicarbonate 650mg BID; BPH: tamsulosin 0.4mg at bedtime; aspirin enteric coated 81 milliGRAM(s) Oral daily  - Dispo: discharge back to [facility] when stable

## 2022-10-30 NOTE — BH INPATIENT PSYCHIATRY PROGRESS NOTE - NSBHMETABOLIC_PSY_ALL_CORE_FT
BMI: BMI (kg/m2): 37.1 (10-29-22 @ 16:08)  HbA1c: A1C with Estimated Average Glucose Result: 5.7 % (10-08-22 @ 10:20)    Glucose: POCT Blood Glucose.: 130 mg/dL (10-30-22 @ 08:01)    BP: --  Lipid Panel: Date/Time: 10-08-22 @ 10:20  Cholesterol, Serum: 151  Direct LDL: --  HDL Cholesterol, Serum: 54  Total Cholesterol/HDL Ration Measurement: --  Triglycerides, Serum: 73

## 2022-10-30 NOTE — BH INPATIENT PSYCHIATRY PROGRESS NOTE - MSE UNSTRUCTURED FT
Patient is awake and alert. Affect is less irritable today. Speech is fluent, over-productive. TP is concrete but coherent. Somewhat paranoid about  hospital staff. No perceptual disturbance reported. No tremor. No suicidal ideations. Poor insight.

## 2022-10-31 LAB
GLUCOSE BLDC GLUCOMTR-MCNC: 117 MG/DL — HIGH (ref 70–99)
GLUCOSE BLDC GLUCOMTR-MCNC: 119 MG/DL — HIGH (ref 70–99)
GLUCOSE BLDC GLUCOMTR-MCNC: 128 MG/DL — HIGH (ref 70–99)
GLUCOSE BLDC GLUCOMTR-MCNC: 148 MG/DL — HIGH (ref 70–99)

## 2022-10-31 PROCEDURE — 99232 SBSQ HOSP IP/OBS MODERATE 35: CPT

## 2022-10-31 RX ORDER — QUETIAPINE FUMARATE 200 MG/1
300 TABLET, FILM COATED ORAL AT BEDTIME
Refills: 0 | Status: DISCONTINUED | OUTPATIENT
Start: 2022-10-31 | End: 2022-11-03

## 2022-10-31 RX ORDER — RISPERIDONE 4 MG/1
2 TABLET ORAL AT BEDTIME
Refills: 0 | Status: DISCONTINUED | OUTPATIENT
Start: 2022-10-31 | End: 2022-11-02

## 2022-10-31 RX ORDER — DIVALPROEX SODIUM 500 MG/1
250 TABLET, DELAYED RELEASE ORAL AT BEDTIME
Refills: 0 | Status: DISCONTINUED | OUTPATIENT
Start: 2022-10-31 | End: 2022-11-02

## 2022-10-31 RX ADMIN — Medication 3 MILLIGRAM(S): at 22:01

## 2022-10-31 RX ADMIN — LITHIUM CARBONATE 900 MILLIGRAM(S): 300 TABLET, EXTENDED RELEASE ORAL at 22:00

## 2022-10-31 RX ADMIN — INSULIN GLARGINE 20 UNIT(S): 100 INJECTION, SOLUTION SUBCUTANEOUS at 22:04

## 2022-10-31 RX ADMIN — Medication 650 MILLIGRAM(S): at 22:01

## 2022-10-31 RX ADMIN — QUETIAPINE FUMARATE 300 MILLIGRAM(S): 200 TABLET, FILM COATED ORAL at 22:00

## 2022-10-31 RX ADMIN — DIVALPROEX SODIUM 250 MILLIGRAM(S): 500 TABLET, DELAYED RELEASE ORAL at 22:01

## 2022-10-31 RX ADMIN — Medication 0: at 21:08

## 2022-10-31 RX ADMIN — TAMSULOSIN HYDROCHLORIDE 0.4 MILLIGRAM(S): 0.4 CAPSULE ORAL at 22:01

## 2022-10-31 RX ADMIN — Medication 81 MILLIGRAM(S): at 10:03

## 2022-10-31 RX ADMIN — RISPERIDONE 2 MILLIGRAM(S): 4 TABLET ORAL at 22:00

## 2022-10-31 RX ADMIN — Medication 650 MILLIGRAM(S): at 10:03

## 2022-10-31 NOTE — BH INPATIENT PSYCHIATRY PROGRESS NOTE - NSBHMETABOLIC_PSY_ALL_CORE_FT
BMI: BMI (kg/m2): 37.1 (10-29-22 @ 16:08)  HbA1c: A1C with Estimated Average Glucose Result: 5.7 % (10-08-22 @ 10:20)  Glucose: POCT Blood Glucose.: 128 mg/dL (10-31-22 @ 12:07)  Lipid Panel: Date/Time: 10-08-22 @ 10:20  Cholesterol, Serum: 151  HDL Cholesterol, Serum: 54  Triglycerides, Serum: 73

## 2022-10-31 NOTE — BH INPATIENT PSYCHIATRY PROGRESS NOTE - NSBHASSESSSUMMFT_PSY_ALL_CORE
67-year-old man currently residing at [facility]. PPH of Bipolar 1 Disorder. Hx of several inpatient admissions- last as few years ago at [place] per brother. Hx of multiple past suicide attempts- none recent. + history of aggressive behavior in the context of noncompliance with medication. PMH- DM2 (insulin dependent), constipation, generalized muscle weakness, BPH. Patient BIBA referred by SNF for agitation, admitted at [facility] initially and found to be Covid+ on 10/11, now admitted at [facility] for ongoing management of daryl.  Presents with grandiosity, elevated/irritable mood, FOI.     Patient transferred to [facility] from  on 10/26. VPA added to existing regimen while at .     10/31 Clinical Update  On exam, patient is less irritable but still grandiose with loud pressured speech. He has however been taking his meds regularly; tolerating recent addition of risperidone well.     Plan:  -Will increase risperidone to 2mg at bedtime given poor response to ongoing meds and given h/o poor med compliance. The goal is to administer WATTS if clinical improvement noted.    - Will continue Lithobid 900mg qhs for daryl, but reduce VPA to 250mg qhs with the aim to d/c  - Will continue melatonin 3mg Oral at bedtime  - Will decrease quetiapine to 300mg at bedtime  - Will continue pocket and mouth checks for medication  - Will continue medications for medical comorbidities-- COVID-19: Asymptomatic, will continue to monitor; DM2: Lantus 20mg qHS; ISS: sodium bicarbonate 650mg BID; BPH: tamsulosin 0.4mg at bedtime; aspirin enteric coated 81mg daily  - Dispo: discharge back to [facility] when stable

## 2022-10-31 NOTE — BH INPATIENT PSYCHIATRY PROGRESS NOTE - MSE UNSTRUCTURED FT
Patient appears stated age, is overweight, dressed in hospital gowns and PJs. Superficially cooperative with interview questions with fair eye contact. No abnormal movements. No Psychomotor agitation or retardation noted. Steady gait observed. Speech is loud and pressured. Reported mood "great" with elevated affect. Thought Process is  tangential, incoherent at times, with loose associations. Thought content notable for grandiose delusions about having millions of dollars and some paranoia towards staff. No perceptual disturbances. Insight and judgement are poor. Impulse control tenuous at the time of exam. Grossly oriented in all spheres.

## 2022-10-31 NOTE — BH INPATIENT PSYCHIATRY PROGRESS NOTE - NSBHFUPINTERVALHXFT_PSY_A_CORE
Patient seen for follow-up for daryl. Chart reviewed and case discussed with interdisciplinary team. Per staff, patient remains irritable and agitated, but has been taking his meds consistently. On exam, patient is somewhat irritable with loud pressured speech but is better engaged. Patient states that he's been taking his meds and would like to leave soon and instructs the writer to call his brother to make "discharge arrangements". Patient denies any hallucinations. patient's appetite is fair. His sleep is adequate but he sleeps in the day room mostly. The patient denies any thought of hurting self or others. His vitals are stable.

## 2022-11-01 LAB
GLUCOSE BLDC GLUCOMTR-MCNC: 120 MG/DL — HIGH (ref 70–99)
GLUCOSE BLDC GLUCOMTR-MCNC: 149 MG/DL — HIGH (ref 70–99)
GLUCOSE BLDC GLUCOMTR-MCNC: 178 MG/DL — HIGH (ref 70–99)

## 2022-11-01 PROCEDURE — 99232 SBSQ HOSP IP/OBS MODERATE 35: CPT

## 2022-11-01 RX ADMIN — Medication 650 MILLIGRAM(S): at 08:42

## 2022-11-01 RX ADMIN — Medication 650 MILLIGRAM(S): at 20:30

## 2022-11-01 RX ADMIN — QUETIAPINE FUMARATE 300 MILLIGRAM(S): 200 TABLET, FILM COATED ORAL at 20:29

## 2022-11-01 RX ADMIN — TAMSULOSIN HYDROCHLORIDE 0.4 MILLIGRAM(S): 0.4 CAPSULE ORAL at 20:29

## 2022-11-01 RX ADMIN — Medication 3 MILLIGRAM(S): at 20:30

## 2022-11-01 RX ADMIN — LITHIUM CARBONATE 900 MILLIGRAM(S): 300 TABLET, EXTENDED RELEASE ORAL at 20:30

## 2022-11-01 RX ADMIN — DIVALPROEX SODIUM 250 MILLIGRAM(S): 500 TABLET, DELAYED RELEASE ORAL at 20:29

## 2022-11-01 RX ADMIN — Medication 1: at 08:56

## 2022-11-01 RX ADMIN — RISPERIDONE 2 MILLIGRAM(S): 4 TABLET ORAL at 20:30

## 2022-11-01 RX ADMIN — Medication 81 MILLIGRAM(S): at 08:42

## 2022-11-01 RX ADMIN — INSULIN GLARGINE 20 UNIT(S): 100 INJECTION, SOLUTION SUBCUTANEOUS at 21:15

## 2022-11-01 NOTE — BH INPATIENT PSYCHIATRY PROGRESS NOTE - NSBHASSESSSUMMFT_PSY_ALL_CORE
67-year-old man currently residing at [facility]. PPH of Bipolar 1 Disorder. Hx of several inpatient admissions- last as few years ago at [place] per brother. Hx of multiple past suicide attempts- none recent. + history of aggressive behavior in the context of noncompliance with medication. PMH- DM2 (insulin dependent), constipation, generalized muscle weakness, BPH. Patient BIBA referred by SNF for agitation, admitted at [facility] initially and found to be Covid+ on 10/11, now admitted at [facility] for ongoing management of daryl.  Presents with grandiosity, elevated/irritable mood, FOI.     Patient transferred to [facility] from  on 10/26. VPA added to existing regimen while at .     11/01 Clinical Update  On exam, patient is less irritable and better engaged, but still with some grandiose and paranoid delusions. He has however been taking his meds regularly; tolerating recent addition of risperidone well.     Plan:  -Will order lithium levels, CMP, Vit B12, folate, and PRP for tomorrow morning  -Will continue risperidone 2mg at bedtime given poor response to ongoing meds and given h/o poor med compliance. The goal is to administer WATTS if clinical improvement noted.    - Will continue Lithobid 900mg qhs for daryl; will continue VPA 250mg qhs with the aim to taper and d/c  - Will continue melatonin 3mg Oral at bedtime  - Will continue quetiapine 300mg at bedtime  - Will continue pocket and mouth checks for medication  - Will continue medications for medical comorbidities-- COVID-19: Asymptomatic, will continue to monitor; DM2: Lantus 20mg qHS; ISS: sodium bicarbonate 650mg BID; BPH: tamsulosin 0.4mg at bedtime; aspirin enteric coated 81mg daily  - Dispo: discharge back to [facility] when stable   67-year-old man currently residing at [facility]. PPH of Bipolar 1 Disorder. Hx of several inpatient admissions- last as few years ago at [place] per brother. Hx of multiple past suicide attempts- none recent. + history of aggressive behavior in the context of noncompliance with medication. PMH- DM2 (insulin dependent), constipation, generalized muscle weakness, BPH. Patient BIBA referred by SNF for agitation, admitted at [facility] initially and found to be Covid+ on 10/11, now admitted at [facility] for ongoing management of daryl.  Presents with grandiosity, elevated/irritable mood, FOI.     Patient transferred to [facility] from  on 10/26. VPA added to existing regimen while at .     11/01 Clinical Update  On exam, patient is less irritable and better engaged, but still with some grandiose and paranoid delusions. He has however been taking his meds regularly; tolerating recent addition of risperidone well.     Plan:  -Will order lithium levels, CMP, Vit B12, folate, and RPR for tomorrow morning  -Will continue risperidone 2mg at bedtime given poor response to ongoing meds and given h/o poor med compliance. The goal is to administer WATTS if clinical improvement noted.    - Will continue Lithobid 900mg qhs for daryl; will continue VPA 250mg qhs with the aim to taper and d/c  - Will continue melatonin 3mg Oral at bedtime  - Will continue quetiapine 300mg at bedtime  - Will continue pocket and mouth checks for medication  - Will continue medications for medical comorbidities-- COVID-19: Asymptomatic, will continue to monitor; DM2: Lantus 20mg qHS; ISS: sodium bicarbonate 650mg BID; BPH: tamsulosin 0.4mg at bedtime; aspirin enteric coated 81mg daily  - Dispo: discharge back to [facility] when stable

## 2022-11-01 NOTE — BH INPATIENT PSYCHIATRY PROGRESS NOTE - NSBHFUPINTERVALHXFT_PSY_A_CORE
Patient seen for follow-up for daryl. Chart reviewed and case discussed with interdisciplinary team. Per staff, patient remains irritable, but continues to his meds consistently. On exam, patient is superficially engaging but is less irritable. Patient states that he feels "great" and would like to get out of here next week. He requests blood work stating he's "ready for it". Patient denies any hallucinations. His appetite is fair, and his sleep is adequate though he continues to sleep in the day room as he's worried about "getting COVID and HIV" in his room. The patient denies any thought of hurting self or others. His vitals are stable.

## 2022-11-01 NOTE — BH INPATIENT PSYCHIATRY PROGRESS NOTE - NSBHMETABOLIC_PSY_ALL_CORE_FT
BMI: BMI (kg/m2): 37.1 (10-29-22 @ 16:08)  HbA1c: A1C with Estimated Average Glucose Result: 5.7 % (10-08-22 @ 10:20)  Glucose: POCT Blood Glucose.: 120 mg/dL (11-01-22 @ 12:04)  Lipid Panel: Date/Time: 10-08-22 @ 10:20  Cholesterol, Serum: 151  HDL Cholesterol, Serum: 54  Triglycerides, Serum: 73

## 2022-11-01 NOTE — BH INPATIENT PSYCHIATRY PROGRESS NOTE - MSE UNSTRUCTURED FT
Patient appears stated age, is overweight, dressed in hospital gowns and PJs. Superficially cooperative with interview questions with fair eye contact. No abnormal movements. No psychomotor agitation or retardation noted. Steady gait observed. Speech is less pressured. Reported mood "I feel great" with irritable nd elevated affect. Thought Process is  tangential with loose associations. Thought content notable for grandiose delusions about having millions of dollars and some paranoia towards staff and peers. No perceptual disturbances. Insight and judgement are poor. Impulse control tenuous at the time of exam. Grossly oriented in all spheres.

## 2022-11-02 LAB
ALBUMIN SERPL ELPH-MCNC: 3.6 G/DL — SIGNIFICANT CHANGE UP (ref 3.3–5)
ALP SERPL-CCNC: 93 U/L — SIGNIFICANT CHANGE UP (ref 40–120)
ALT FLD-CCNC: 20 U/L — SIGNIFICANT CHANGE UP (ref 4–41)
ANION GAP SERPL CALC-SCNC: 12 MMOL/L — SIGNIFICANT CHANGE UP (ref 7–14)
AST SERPL-CCNC: 16 U/L — SIGNIFICANT CHANGE UP (ref 4–40)
BILIRUB SERPL-MCNC: 0.4 MG/DL — SIGNIFICANT CHANGE UP (ref 0.2–1.2)
BUN SERPL-MCNC: 20 MG/DL — SIGNIFICANT CHANGE UP (ref 7–23)
CALCIUM SERPL-MCNC: 9.5 MG/DL — SIGNIFICANT CHANGE UP (ref 8.4–10.5)
CHLORIDE SERPL-SCNC: 102 MMOL/L — SIGNIFICANT CHANGE UP (ref 98–107)
CO2 SERPL-SCNC: 22 MMOL/L — SIGNIFICANT CHANGE UP (ref 22–31)
CREAT SERPL-MCNC: 0.67 MG/DL — SIGNIFICANT CHANGE UP (ref 0.5–1.3)
EGFR: 103 ML/MIN/1.73M2 — SIGNIFICANT CHANGE UP
FOLATE SERPL-MCNC: 12.1 NG/ML — SIGNIFICANT CHANGE UP (ref 3.1–17.5)
GLUCOSE BLDC GLUCOMTR-MCNC: 110 MG/DL — HIGH (ref 70–99)
GLUCOSE BLDC GLUCOMTR-MCNC: 113 MG/DL — HIGH (ref 70–99)
GLUCOSE BLDC GLUCOMTR-MCNC: 131 MG/DL — HIGH (ref 70–99)
GLUCOSE BLDC GLUCOMTR-MCNC: 155 MG/DL — HIGH (ref 70–99)
GLUCOSE SERPL-MCNC: 224 MG/DL — HIGH (ref 70–99)
LITHIUM SERPL-MCNC: 0.8 MMOL/L — SIGNIFICANT CHANGE UP (ref 0.6–1.2)
POTASSIUM SERPL-MCNC: 4.4 MMOL/L — SIGNIFICANT CHANGE UP (ref 3.5–5.3)
POTASSIUM SERPL-SCNC: 4.4 MMOL/L — SIGNIFICANT CHANGE UP (ref 3.5–5.3)
PROT SERPL-MCNC: 6.9 G/DL — SIGNIFICANT CHANGE UP (ref 6–8.3)
SODIUM SERPL-SCNC: 136 MMOL/L — SIGNIFICANT CHANGE UP (ref 135–145)
VIT B12 SERPL-MCNC: 743 PG/ML — SIGNIFICANT CHANGE UP (ref 200–900)

## 2022-11-02 PROCEDURE — 99232 SBSQ HOSP IP/OBS MODERATE 35: CPT

## 2022-11-02 RX ORDER — RISPERIDONE 4 MG/1
3 TABLET ORAL AT BEDTIME
Refills: 0 | Status: DISCONTINUED | OUTPATIENT
Start: 2022-11-02 | End: 2022-11-04

## 2022-11-02 RX ADMIN — Medication 650 MILLIGRAM(S): at 08:48

## 2022-11-02 RX ADMIN — Medication 81 MILLIGRAM(S): at 08:48

## 2022-11-02 RX ADMIN — Medication 650 MILLIGRAM(S): at 20:07

## 2022-11-02 RX ADMIN — Medication 3 MILLIGRAM(S): at 20:08

## 2022-11-02 RX ADMIN — INSULIN GLARGINE 20 UNIT(S): 100 INJECTION, SOLUTION SUBCUTANEOUS at 20:58

## 2022-11-02 RX ADMIN — QUETIAPINE FUMARATE 300 MILLIGRAM(S): 200 TABLET, FILM COATED ORAL at 20:09

## 2022-11-02 RX ADMIN — LITHIUM CARBONATE 900 MILLIGRAM(S): 300 TABLET, EXTENDED RELEASE ORAL at 20:07

## 2022-11-02 RX ADMIN — RISPERIDONE 3 MILLIGRAM(S): 4 TABLET ORAL at 20:07

## 2022-11-02 RX ADMIN — Medication 1: at 12:35

## 2022-11-02 RX ADMIN — TAMSULOSIN HYDROCHLORIDE 0.4 MILLIGRAM(S): 0.4 CAPSULE ORAL at 20:08

## 2022-11-02 NOTE — BH INPATIENT PSYCHIATRY PROGRESS NOTE - NSBHASSESSSUMMFT_PSY_ALL_CORE
67-year-old man currently residing at [facility]. PPH of Bipolar 1 Disorder. Hx of several inpatient admissions- last as few years ago at [place] per brother. Hx of multiple past suicide attempts- none recent. + history of aggressive behavior in the context of noncompliance with medication. PMH- DM2 (insulin dependent), constipation, generalized muscle weakness, BPH. Patient BIBA referred by SNF for agitation, admitted at [facility] initially and found to be Covid+ on 10/11, now admitted at [facility] for ongoing management of daryl.  Presents with grandiosity, elevated/irritable mood, FOI.     Patient transferred to [facility] from  on 10/26. VPA added to existing regimen while at  and now being weaned off.    11/02 Clinical Update  On exam, patient calm today and mostly linear, but still with some paranoid delusions towards staff. He has however been taking his meds regularly; tolerating recent addition of risperidone well. No incidents of self-harm since admission. At present, patient carries a diagnosis of schizoaffective disorder and is unable to care for self on his own for his ADLs and medical needs and requires return placement to a skilled nursing care facility and an assisted living facility is not enough. He has been observed participating in groups and will benefit form group therapy and activities after discharge.     Plan:  -Will order lithium levels, CMP, Vit B12, folate, and RPR for tomorrow morning  -Will increase risperidone to 3mg at bedtime with a goal to administer WATTS if clinical improvement noted.    - Will continue Lithobid 900mg qhs for daryl; will discontinue VPA today  - Will continue melatonin 3mg Oral at bedtime  - Will continue quetiapine 300mg at bedtime  - Will continue pocket and mouth checks for medication  - Will continue medications for medical comorbidities-- COVID-19: Asymptomatic, will continue to monitor; DM2: Lantus 20mg qHS; ISS: sodium bicarbonate 650mg BID; BPH: tamsulosin 0.4mg at bedtime; aspirin enteric coated 81mg daily  - Dispo: discharge back to [facility] when stable

## 2022-11-02 NOTE — BH INPATIENT PSYCHIATRY PROGRESS NOTE - NSBHFUPINTERVALHXFT_PSY_A_CORE
Patient seen for follow-up for daryl. Chart reviewed and case discussed with interdisciplinary team. No significant overnight event reported. On exam, patient is superficially engaging but is calm. Patient states that he feels "good" and would like to get a copy of his blood work from today. He reiterates that he would like to get discharged next week. Patient denies any hallucinations. His appetite is fair, and his sleep is adequate though he continues to sleep in the day room. The patient denies any thought of hurting self or others. His vitals are stable.

## 2022-11-02 NOTE — BH TREATMENT PLAN - NSTXCAREGIVERPARTICIPATE_PSY_P_CORE
Family/Caregiver participated in identification of needs/problems/goals for treatment/Family/Caregiver participated in defining interventions/Family/Caregiver participated in development of after care plan
Family/Caregiver participated in identification of needs/problems/goals for treatment

## 2022-11-02 NOTE — BH INPATIENT PSYCHIATRY PROGRESS NOTE - NSBHMETABOLIC_PSY_ALL_CORE_FT
BMI: BMI (kg/m2): 37.1 (10-29-22 @ 16:08)  HbA1c: A1C with Estimated Average Glucose Result: 5.7 % (10-08-22 @ 10:20)  Glucose: POCT Blood Glucose.: 110 mg/dL (11-02-22 @ 08:08)  Lipid Panel: Date/Time: 10-08-22 @ 10:20  Cholesterol, Serum: 151  HDL Cholesterol, Serum: 54  Triglycerides, Serum: 73

## 2022-11-02 NOTE — BH INPATIENT PSYCHIATRY PROGRESS NOTE - MSE UNSTRUCTURED FT
Patient appears stated age, is overweight, is somewhat dishevelled. Superficially cooperative with interview questions with fair eye contact. No abnormal movements appreciated. No psychomotor agitation or retardation noted. Steady gait observed. Speech is fluent, spontaneous. Reported mood "I feel great" with irritable and elevated affect. Thought Process is more linear today. Thought content still notable for some paranoia towards staff and peers. No perceptual disturbances. Insight and judgement are improving. Impulse control intact at the time of exam. Grossly oriented in all spheres.

## 2022-11-03 LAB
GLUCOSE BLDC GLUCOMTR-MCNC: 119 MG/DL — HIGH (ref 70–99)
GLUCOSE BLDC GLUCOMTR-MCNC: 124 MG/DL — HIGH (ref 70–99)
GLUCOSE BLDC GLUCOMTR-MCNC: 139 MG/DL — HIGH (ref 70–99)
GLUCOSE BLDC GLUCOMTR-MCNC: 141 MG/DL — HIGH (ref 70–99)
T PALLIDUM AB TITR SER: NEGATIVE — SIGNIFICANT CHANGE UP

## 2022-11-03 PROCEDURE — 99232 SBSQ HOSP IP/OBS MODERATE 35: CPT

## 2022-11-03 RX ORDER — PALIPERIDONE 1.5 MG/1
234 TABLET, EXTENDED RELEASE ORAL ONCE
Refills: 0 | Status: COMPLETED | OUTPATIENT
Start: 2022-11-04 | End: 2022-11-04

## 2022-11-03 RX ORDER — QUETIAPINE FUMARATE 200 MG/1
100 TABLET, FILM COATED ORAL AT BEDTIME
Refills: 0 | Status: DISCONTINUED | OUTPATIENT
Start: 2022-11-03 | End: 2022-11-07

## 2022-11-03 RX ORDER — PALIPERIDONE 1.5 MG/1
156 TABLET, EXTENDED RELEASE ORAL ONCE
Refills: 0 | Status: COMPLETED | OUTPATIENT
Start: 2022-11-11 | End: 2022-11-11

## 2022-11-03 RX ADMIN — QUETIAPINE FUMARATE 100 MILLIGRAM(S): 200 TABLET, FILM COATED ORAL at 20:21

## 2022-11-03 RX ADMIN — RISPERIDONE 3 MILLIGRAM(S): 4 TABLET ORAL at 20:20

## 2022-11-03 RX ADMIN — Medication 3 MILLIGRAM(S): at 20:20

## 2022-11-03 RX ADMIN — Medication 81 MILLIGRAM(S): at 08:43

## 2022-11-03 RX ADMIN — Medication 650 MILLIGRAM(S): at 08:43

## 2022-11-03 RX ADMIN — Medication 650 MILLIGRAM(S): at 20:21

## 2022-11-03 RX ADMIN — TAMSULOSIN HYDROCHLORIDE 0.4 MILLIGRAM(S): 0.4 CAPSULE ORAL at 20:21

## 2022-11-03 RX ADMIN — LITHIUM CARBONATE 900 MILLIGRAM(S): 300 TABLET, EXTENDED RELEASE ORAL at 20:20

## 2022-11-03 NOTE — BH INPATIENT PSYCHIATRY PROGRESS NOTE - NSBHASSESSSUMMFT_PSY_ALL_CORE
67-year-old man currently residing at [facility]. PPH of Bipolar 1 Disorder. Hx of several inpatient admissions- last as few years ago at [place] per brother. Hx of multiple past suicide attempts- none recent. + history of aggressive behavior in the context of noncompliance with medication. PMH- DM2 (insulin dependent), constipation, generalized muscle weakness, BPH. Patient BIBA referred by SNF for agitation, admitted at [facility] initially and found to be Covid+ on 10/11, now admitted at [facility] for ongoing management of daryl.  Presents with grandiosity, elevated/irritable mood, FOI.     Patient transferred to [facility] from  on 10/26. VPA added to existing regimen while at  and now being weaned off.    At present, patient carries a diagnosis of schizoaffective disorder and is unable to care for self on his own for his ADLs and medical needs and requires return placement to a skilled nursing care facility and an assisted living facility is not enough. He has been observed participating in groups and will benefit form group therapy and activities after discharge.    11/03 Clinical Update  On exam, patient is calm and engaging, but still with some paranoid delusions towards staff. He has been taking his meds regularly; tolerating recent addition of risperidone well. No incidents of self-harm since admission. Patient is however reluctant to return to SNF. Patient's brother Andre contacted at [phone] and asked to speak with patient about discharge back to SNF. Serum lithium level from 11/02 is 0.8    Plan:  - Will continue risperidone 3mg at bedtime; Will administer 1st dose of WATTS Invega Sustenna 234mg IM tomorrow.  - Will continue Lithobid 900mg qhs for daryl  - Will continue melatonin 3mg Oral at bedtime  - Will decrease quetiapine to 100mg at bedtime  - Will continue pocket and mouth checks for medication  - Will continue medications for medical comorbidities-- COVID-19: Asymptomatic, will continue to monitor; DM2: Lantus 20mg qHS; ISS: sodium bicarbonate 650mg BID; BPH: tamsulosin 0.4mg at bedtime; aspirin enteric coated 81mg daily  - Dispo: discharge back to SNF when stable

## 2022-11-03 NOTE — BH INPATIENT PSYCHIATRY PROGRESS NOTE - NSBHMETABOLIC_PSY_ALL_CORE_FT
BMI: BMI (kg/m2): 37.1 (10-29-22 @ 16:08)  HbA1c: A1C with Estimated Average Glucose Result: 5.7 % (10-08-22 @ 10:20)  Glucose: POCT Blood Glucose.: 141 mg/dL (11-03-22 @ 11:51)  Lipid Panel: Date/Time: 10-08-22 @ 10:20  Cholesterol, Serum: 151  HDL Cholesterol, Serum: 54  Triglycerides, Serum: 73

## 2022-11-03 NOTE — BH INPATIENT PSYCHIATRY PROGRESS NOTE - MSE UNSTRUCTURED FT
Patient appears stated age, is overweight, with improved grooming and hygiene. He is cooperative with interview questions with fair eye contact. No abnormal movements appreciated. No psychomotor agitation or retardation noted. Steady gait observed. Speech is fluent, spontaneous. Reported mood "good" with less elevated and more appropriate affect. Thought Process is linear today. Thought content still notable for some paranoia towards staff and peers. No perceptual disturbances. Insight and judgement are improving. Impulse control intact at the time of exam. Grossly oriented in all spheres.

## 2022-11-03 NOTE — BH INPATIENT PSYCHIATRY PROGRESS NOTE - NSBHFUPINTERVALHXFT_PSY_A_CORE
Patient seen for follow-up for daryl. Chart reviewed and case discussed with interdisciplinary team. No significant overnight event reported. On exam, patient is better engaged and states that he feels "good" and that he would like to go back to being a "" on discharge. Patient is currently refusing sign forms for [facility] eval. Patient denies any hallucinations. His appetite is fair, and his sleep is adequate though he continues to sleep in the day room. The patient denies any thought of hurting self or others. His vitals are notable for BP in the low normal range with no orthostasis; patient denies dizziness.

## 2022-11-04 LAB
GLUCOSE BLDC GLUCOMTR-MCNC: 143 MG/DL — HIGH (ref 70–99)
GLUCOSE BLDC GLUCOMTR-MCNC: 174 MG/DL — HIGH (ref 70–99)
GLUCOSE BLDC GLUCOMTR-MCNC: 175 MG/DL — HIGH (ref 70–99)
GLUCOSE BLDC GLUCOMTR-MCNC: 183 MG/DL — HIGH (ref 70–99)

## 2022-11-04 PROCEDURE — 99232 SBSQ HOSP IP/OBS MODERATE 35: CPT

## 2022-11-04 RX ADMIN — Medication 1: at 17:08

## 2022-11-04 RX ADMIN — Medication 650 MILLIGRAM(S): at 21:29

## 2022-11-04 RX ADMIN — Medication 650 MILLIGRAM(S): at 08:13

## 2022-11-04 RX ADMIN — PALIPERIDONE 234 MILLIGRAM(S): 1.5 TABLET, EXTENDED RELEASE ORAL at 11:24

## 2022-11-04 RX ADMIN — TAMSULOSIN HYDROCHLORIDE 0.4 MILLIGRAM(S): 0.4 CAPSULE ORAL at 21:29

## 2022-11-04 RX ADMIN — Medication 1: at 12:42

## 2022-11-04 RX ADMIN — Medication 3 MILLIGRAM(S): at 21:30

## 2022-11-04 RX ADMIN — Medication 81 MILLIGRAM(S): at 08:12

## 2022-11-04 RX ADMIN — LITHIUM CARBONATE 900 MILLIGRAM(S): 300 TABLET, EXTENDED RELEASE ORAL at 21:29

## 2022-11-04 RX ADMIN — QUETIAPINE FUMARATE 100 MILLIGRAM(S): 200 TABLET, FILM COATED ORAL at 21:30

## 2022-11-04 RX ADMIN — INSULIN GLARGINE 20 UNIT(S): 100 INJECTION, SOLUTION SUBCUTANEOUS at 21:30

## 2022-11-04 NOTE — BH INPATIENT PSYCHIATRY PROGRESS NOTE - MSE UNSTRUCTURED FT
Patient appears stated age, is overweight, with improved grooming and hygiene. He is cooperative with interview questions with fair eye contact. No abnormal movements appreciated. No psychomotor agitation or retardation noted. Steady gait observed. Speech is fluent, spontaneous. Reported mood "not happy" with irritable and elevated affect. Thought Process is linear today. Thought content still notable for some paranoia towards staff and peers. No perceptual disturbances. Insight and judgement are improving. Impulse control intact at the time of exam. Grossly oriented in all spheres.

## 2022-11-04 NOTE — BH INPATIENT PSYCHIATRY PROGRESS NOTE - NSBHASSESSSUMMFT_PSY_ALL_CORE
67-year-old man currently residing at [facility]. PPH of Bipolar 1 Disorder. Hx of several inpatient admissions- last as few years ago at [place] per brother. Hx of multiple past suicide attempts- none recent. + history of aggressive behavior in the context of noncompliance with medication. PMH- DM2 (insulin dependent), constipation, generalized muscle weakness, BPH. Patient BIBA referred by SNF for agitation, admitted at [facility] initially and found to be Covid+ on 10/11, now admitted at [facility] for ongoing management of daryl.  Presents with grandiosity, elevated/irritable mood, FOI.     Patient transferred to [facility] from  on 10/26. VPA added to existing regimen while at  and now being weaned off.    At present, patient carries a diagnosis of schizoaffective disorder and is unable to care for self on his own for his ADLs and medical needs and requires return placement to a skilled nursing care facility and an assisted living facility is not enough. He has been observed participating in groups and will benefit form group therapy and activities after discharge.    11/04 Clinical Update  On exam, patient is irritable and refused WATTS this morning, but finally agreed to it after speaking with his brother. He received his 1st dose of Invega Sustenna 234mg IM today and tolerated it well. Patient still noted to have some paranoid delusions towards staff, these have however improved significantly since admission. No incidents of self-harm since admission. Patient is reluctant to return to SNF but his brothers are working on getting him to accept SNF placement.     Plan:  - Will discontinue risperidone.1st dose of WATTS Invega Sustenna 234mg IM given today 11/04 and next dose of 156mg IM scheduled for 11/11.  - Will continue Lithobid 900mg qhs for daryl  - Will continue melatonin 3mg Oral at bedtime  - Will continue quetiapine 100mg at bedtime  - Will continue pocket and mouth checks for medication  - Will continue medications for medical comorbidities-- COVID-19: Asymptomatic, will continue to monitor; DM2: Lantus 20mg qHS; ISS: sodium bicarbonate 650mg BID; BPH: tamsulosin 0.4mg at bedtime; aspirin enteric coated 81mg daily  - Dispo: discharge back to SNF when stable

## 2022-11-04 NOTE — BH INPATIENT PSYCHIATRY PROGRESS NOTE - NSBHMETABOLIC_PSY_ALL_CORE_FT
BMI: BMI (kg/m2): 37.1 (10-29-22 @ 16:08)  HbA1c: A1C with Estimated Average Glucose Result: 5.7 % (10-08-22 @ 10:20)  Glucose: POCT Blood Glucose.: 174 mg/dL (11-04-22 @ 12:05)  Lipid Panel: Date/Time: 10-08-22 @ 10:20  Cholesterol, Serum: 151  HDL Cholesterol, Serum: 54  Triglycerides, Serum: 73

## 2022-11-05 LAB
GLUCOSE BLDC GLUCOMTR-MCNC: 146 MG/DL — HIGH (ref 70–99)
GLUCOSE BLDC GLUCOMTR-MCNC: 146 MG/DL — HIGH (ref 70–99)
GLUCOSE BLDC GLUCOMTR-MCNC: 169 MG/DL — HIGH (ref 70–99)
GLUCOSE BLDC GLUCOMTR-MCNC: 171 MG/DL — HIGH (ref 70–99)

## 2022-11-05 RX ADMIN — Medication 1: at 17:07

## 2022-11-05 RX ADMIN — Medication 1: at 08:30

## 2022-11-05 RX ADMIN — Medication 3 MILLIGRAM(S): at 20:53

## 2022-11-05 RX ADMIN — QUETIAPINE FUMARATE 100 MILLIGRAM(S): 200 TABLET, FILM COATED ORAL at 20:54

## 2022-11-05 RX ADMIN — Medication 650 MILLIGRAM(S): at 08:30

## 2022-11-05 RX ADMIN — Medication 650 MILLIGRAM(S): at 20:53

## 2022-11-05 RX ADMIN — Medication 81 MILLIGRAM(S): at 08:31

## 2022-11-05 RX ADMIN — TAMSULOSIN HYDROCHLORIDE 0.4 MILLIGRAM(S): 0.4 CAPSULE ORAL at 20:53

## 2022-11-05 RX ADMIN — LITHIUM CARBONATE 900 MILLIGRAM(S): 300 TABLET, EXTENDED RELEASE ORAL at 20:53

## 2022-11-05 RX ADMIN — INSULIN GLARGINE 20 UNIT(S): 100 INJECTION, SOLUTION SUBCUTANEOUS at 20:48

## 2022-11-05 RX ADMIN — Medication 0: at 20:47

## 2022-11-06 LAB
GLUCOSE BLDC GLUCOMTR-MCNC: 126 MG/DL — HIGH (ref 70–99)
GLUCOSE BLDC GLUCOMTR-MCNC: 126 MG/DL — HIGH (ref 70–99)
GLUCOSE BLDC GLUCOMTR-MCNC: 139 MG/DL — HIGH (ref 70–99)
GLUCOSE BLDC GLUCOMTR-MCNC: 139 MG/DL — HIGH (ref 70–99)

## 2022-11-06 RX ADMIN — INSULIN GLARGINE 20 UNIT(S): 100 INJECTION, SOLUTION SUBCUTANEOUS at 20:50

## 2022-11-06 RX ADMIN — Medication 3 MILLIGRAM(S): at 20:38

## 2022-11-06 RX ADMIN — Medication 650 MILLIGRAM(S): at 20:38

## 2022-11-06 RX ADMIN — Medication 81 MILLIGRAM(S): at 08:30

## 2022-11-06 RX ADMIN — QUETIAPINE FUMARATE 100 MILLIGRAM(S): 200 TABLET, FILM COATED ORAL at 20:38

## 2022-11-06 RX ADMIN — TAMSULOSIN HYDROCHLORIDE 0.4 MILLIGRAM(S): 0.4 CAPSULE ORAL at 20:37

## 2022-11-06 RX ADMIN — LITHIUM CARBONATE 900 MILLIGRAM(S): 300 TABLET, EXTENDED RELEASE ORAL at 20:38

## 2022-11-06 RX ADMIN — Medication 650 MILLIGRAM(S): at 08:30

## 2022-11-07 LAB
GLUCOSE BLDC GLUCOMTR-MCNC: 115 MG/DL — HIGH (ref 70–99)
GLUCOSE BLDC GLUCOMTR-MCNC: 115 MG/DL — HIGH (ref 70–99)
GLUCOSE BLDC GLUCOMTR-MCNC: 127 MG/DL — HIGH (ref 70–99)
GLUCOSE BLDC GLUCOMTR-MCNC: 172 MG/DL — HIGH (ref 70–99)

## 2022-11-07 RX ORDER — QUETIAPINE FUMARATE 200 MG/1
50 TABLET, FILM COATED ORAL AT BEDTIME
Refills: 0 | Status: DISCONTINUED | OUTPATIENT
Start: 2022-11-07 | End: 2022-11-11

## 2022-11-07 RX ADMIN — Medication 650 MILLIGRAM(S): at 09:06

## 2022-11-07 RX ADMIN — Medication 650 MILLIGRAM(S): at 20:11

## 2022-11-07 RX ADMIN — INSULIN GLARGINE 20 UNIT(S): 100 INJECTION, SOLUTION SUBCUTANEOUS at 21:27

## 2022-11-07 RX ADMIN — Medication 1: at 12:41

## 2022-11-07 RX ADMIN — TAMSULOSIN HYDROCHLORIDE 0.4 MILLIGRAM(S): 0.4 CAPSULE ORAL at 20:12

## 2022-11-07 RX ADMIN — Medication 81 MILLIGRAM(S): at 09:06

## 2022-11-07 RX ADMIN — LITHIUM CARBONATE 900 MILLIGRAM(S): 300 TABLET, EXTENDED RELEASE ORAL at 20:11

## 2022-11-07 RX ADMIN — Medication 3 MILLIGRAM(S): at 20:12

## 2022-11-07 RX ADMIN — QUETIAPINE FUMARATE 50 MILLIGRAM(S): 200 TABLET, FILM COATED ORAL at 20:12

## 2022-11-07 NOTE — BH INPATIENT PSYCHIATRY PROGRESS NOTE - NSBHASSESSSUMMFT_PSY_ALL_CORE
67-year-old man currently residing at [facility]. PPH of Bipolar 1 Disorder. Hx of several inpatient admissions- last as few years ago at [place] per brother. Hx of multiple past suicide attempts- none recent. + history of aggressive behavior in the context of noncompliance with medication. PMH- DM2 (insulin dependent), constipation, generalized muscle weakness, BPH. Patient BIBA referred by SNF for agitation, admitted at [facility] initially and found to be Covid+ on 10/11, now admitted at [facility] for ongoing management of daryl.  Presents with grandiosity, elevated/irritable mood, FOI.     Patient transferred to [facility] from  on 10/26. VPA added to existing regimen while at  and now being weaned off.    At present, patient carries a diagnosis of schizoaffective disorder and is unable to care for self on his own for his ADLs and medical needs and requires return placement to a skilled nursing care facility and an assisted living facility is not enough. He has been observed participating in groups and will benefit form group therapy and activities after discharge.    11/07 Clinical Update  On exam, patient is calm with no paranoia today. He remains discharge focused. There have been no incidents of self-harm since admission. Patient is reluctant to return to SNF but his brothers are working on getting him to accept SNF placement. Patient received his 1st dose of Invega Sustenna 234mg IM on 11/04; no pain reported at injection site.      Plan:  - Will administer 2nd dose of 156mg IM on 11/11.  - Will continue Lithobid 900mg qhs for daryl  - Will continue melatonin 3mg Oral at bedtime for sleep  - Will continue quetiapine 100mg at bedtime for mood stabilization  - Will continue pocket and mouth checks for medication  - Will continue medications for medical comorbidities-- COVID-19: Asymptomatic, will continue to monitor; DM2: Lantus 20mg qHS; ISS: sodium bicarbonate 650mg BID; BPH: tamsulosin 0.4mg at bedtime; aspirin enteric coated 81mg daily  - Dispo: discharge back to SNF when stable

## 2022-11-07 NOTE — BH INPATIENT PSYCHIATRY PROGRESS NOTE - MSE UNSTRUCTURED FT
Patient appears stated age, is overweight, with improved grooming and hygiene. He is cooperative with interview questions with fair eye contact. No abnormal movements appreciated. No psychomotor agitation or retardation noted. Steady gait observed. Speech is fluent, spontaneous. Reported mood "I'm okay, just a little tired" with constricted affect. Thought Process is linear today. No delusions reported today. Patient denies SI/HI. No perceptual disturbances. Insight and judgement are improving. Impulse control intact at the time of exam. Grossly oriented in all spheres.

## 2022-11-07 NOTE — BH INPATIENT PSYCHIATRY PROGRESS NOTE - NSBHMETABOLIC_PSY_ALL_CORE_FT
BMI: BMI (kg/m2): 37.1 (10-29-22 @ 16:08)  HbA1c: A1C with Estimated Average Glucose Result: 5.7 % (10-08-22 @ 10:20)  Glucose: POCT Blood Glucose.: 127 mg/dL (11-07-22 @ 08:14)  Lipid Panel: Date/Time: 10-08-22 @ 10:20  Cholesterol, Serum: 151  HDL Cholesterol, Serum: 54  Triglycerides, Serum: 73

## 2022-11-07 NOTE — BH INPATIENT PSYCHIATRY PROGRESS NOTE - NSBHFUPINTERVALHXFT_PSY_A_CORE
Patient seen for follow-up for daryl. Chart reviewed and case discussed with interdisciplinary team. No significant event reported over the weekend. On exam, patient is calm and states that he's feeling a little tired after receiving the injection on Friday; denies any pain at the site of injection. No headache/ dizziness reported. Patient inquires about a discharge date and writer informs him that we're considering discharging him early next week if he remains stable. Patient denies any hallucinations. His appetite is fair, and his sleep is adequate though he continues to sleep on and off in the day room. The patient denies any thought of hurting self or others. His vitals are stable.

## 2022-11-08 LAB
GLUCOSE BLDC GLUCOMTR-MCNC: 146 MG/DL — HIGH (ref 70–99)
GLUCOSE BLDC GLUCOMTR-MCNC: 166 MG/DL — HIGH (ref 70–99)
GLUCOSE BLDC GLUCOMTR-MCNC: 88 MG/DL — SIGNIFICANT CHANGE UP (ref 70–99)
GLUCOSE BLDC GLUCOMTR-MCNC: 94 MG/DL — SIGNIFICANT CHANGE UP (ref 70–99)

## 2022-11-08 PROCEDURE — 99231 SBSQ HOSP IP/OBS SF/LOW 25: CPT

## 2022-11-08 RX ADMIN — Medication 1: at 12:26

## 2022-11-08 RX ADMIN — Medication 650 MILLIGRAM(S): at 20:05

## 2022-11-08 RX ADMIN — LITHIUM CARBONATE 900 MILLIGRAM(S): 300 TABLET, EXTENDED RELEASE ORAL at 20:05

## 2022-11-08 RX ADMIN — Medication 3 MILLIGRAM(S): at 20:05

## 2022-11-08 RX ADMIN — QUETIAPINE FUMARATE 50 MILLIGRAM(S): 200 TABLET, FILM COATED ORAL at 20:06

## 2022-11-08 RX ADMIN — TAMSULOSIN HYDROCHLORIDE 0.4 MILLIGRAM(S): 0.4 CAPSULE ORAL at 20:05

## 2022-11-08 RX ADMIN — INSULIN GLARGINE 20 UNIT(S): 100 INJECTION, SOLUTION SUBCUTANEOUS at 21:03

## 2022-11-08 NOTE — BH INPATIENT PSYCHIATRY PROGRESS NOTE - NSBHMETABOLIC_PSY_ALL_CORE_FT
BMI: BMI (kg/m2): 37.1 (10-29-22 @ 16:08)  HbA1c: A1C with Estimated Average Glucose Result: 5.7 % (10-08-22 @ 10:20)  Glucose: POCT Blood Glucose.: 166 mg/dL (11-08-22 @ 11:54)  Lipid Panel: Date/Time: 10-08-22 @ 10:20  Cholesterol, Serum: 151  HDL Cholesterol, Serum: 54  Triglycerides, Serum: 73

## 2022-11-08 NOTE — BH INPATIENT PSYCHIATRY PROGRESS NOTE - NSBHFUPINTERVALHXFT_PSY_A_CORE
Patient seen for follow-up for daryl. Chart reviewed and case discussed with interdisciplinary team. No significant overnight event reported. On exam, patient is calm and denies any concerns. He only inquires if he'll get another injection and writer informs him that he'll get his 2nd Invega Sustenna injection this Friday.  Patient denies any hallucinations. His appetite and sleep are adequate. The patient denies any thought of hurting self or others. His vitals are stable.

## 2022-11-08 NOTE — BH INPATIENT PSYCHIATRY PROGRESS NOTE - MSE UNSTRUCTURED FT
Patient appears stated age, is overweight, with improved grooming and hygiene. He is cooperative with interview questions with fair eye contact. No abnormal movements appreciated. No psychomotor agitation or retardation noted. Steady gait observed. Speech is fluent, spontaneous. Reported mood "fine" with constricted but stable affect. Thought Process is linear today. No delusions reported today. Patient denies SI/HI. No perceptual disturbances. Insight and judgement are improving. Impulse control intact at the time of exam. Grossly oriented in all spheres.

## 2022-11-08 NOTE — BH INPATIENT PSYCHIATRY PROGRESS NOTE - NSBHASSESSSUMMFT_PSY_ALL_CORE
67-year-old man currently residing at [facility]. PPH of Bipolar 1 Disorder. Hx of several inpatient admissions- last as few years ago at [place] per brother. Hx of multiple past suicide attempts- none recent. + history of aggressive behavior in the context of noncompliance with medication. PMH- DM2 (insulin dependent), constipation, generalized muscle weakness, BPH. Patient BIBA referred by SNF for agitation, admitted at [facility] initially and found to be Covid+ on 10/11, now admitted at [facility] for ongoing management of daryl.  Presents with grandiosity, elevated/irritable mood, FOI.     Patient transferred to [facility] from  on 10/26.     At present, patient carries a diagnosis of schizoaffective disorder and is unable to care for self on his own for his ADLs and medical needs and requires return placement to a skilled nursing care facility and an assisted living facility is not enough. He has been observed participating in groups and will benefit form group therapy and activities after discharge.    11/08 Clinical Update  On exam today, patient is calm and denies any new concerns. His daryl has largely resolved. He is still ambivalent about returning to the nursing home but does not get upset while discussing about it; his brothers are working on getting him to accept SNF placement. There have been no incidents of self-harm since admission. Patient received his 1st dose of Invega Sustenna 234mg IM on 11/04; no pain reported at injection site.; next dose is due on 11/11.      Plan:  - Will administer 2nd dose of 156mg IM on 11/11.  - Will continue Lithobid 900mg qhs for daryl  - Will continue melatonin 3mg Oral at bedtime for sleep  - Will continue quetiapine 100mg at bedtime for mood stabilization  - Will continue pocket and mouth checks for medication  - Will continue medications for medical comorbidities-- COVID-19: Asymptomatic, will continue to monitor; DM2: Lantus 20mg qHS; ISS: sodium bicarbonate 650mg BID; BPH: tamsulosin 0.4mg at bedtime; aspirin enteric coated 81mg daily  - Dispo: discharge back to [facility] when stable

## 2022-11-09 LAB
GLUCOSE BLDC GLUCOMTR-MCNC: 113 MG/DL — HIGH (ref 70–99)
GLUCOSE BLDC GLUCOMTR-MCNC: 116 MG/DL — HIGH (ref 70–99)
GLUCOSE BLDC GLUCOMTR-MCNC: 118 MG/DL — HIGH (ref 70–99)
GLUCOSE BLDC GLUCOMTR-MCNC: 162 MG/DL — HIGH (ref 70–99)

## 2022-11-09 PROCEDURE — 99231 SBSQ HOSP IP/OBS SF/LOW 25: CPT

## 2022-11-09 RX ORDER — PALIPERIDONE 1.5 MG/1
156 TABLET, EXTENDED RELEASE ORAL
Qty: 0 | Refills: 0 | DISCHARGE
Start: 2022-11-09

## 2022-11-09 RX ORDER — QUETIAPINE FUMARATE 200 MG/1
1 TABLET, FILM COATED ORAL
Qty: 0 | Refills: 0 | DISCHARGE
Start: 2022-11-09

## 2022-11-09 RX ADMIN — Medication 3 MILLIGRAM(S): at 21:17

## 2022-11-09 RX ADMIN — Medication 650 MILLIGRAM(S): at 08:31

## 2022-11-09 RX ADMIN — Medication 1: at 12:59

## 2022-11-09 RX ADMIN — Medication 650 MILLIGRAM(S): at 21:17

## 2022-11-09 RX ADMIN — Medication 81 MILLIGRAM(S): at 08:31

## 2022-11-09 RX ADMIN — INSULIN GLARGINE 20 UNIT(S): 100 INJECTION, SOLUTION SUBCUTANEOUS at 21:49

## 2022-11-09 RX ADMIN — QUETIAPINE FUMARATE 50 MILLIGRAM(S): 200 TABLET, FILM COATED ORAL at 21:17

## 2022-11-09 RX ADMIN — LITHIUM CARBONATE 900 MILLIGRAM(S): 300 TABLET, EXTENDED RELEASE ORAL at 21:16

## 2022-11-09 RX ADMIN — TAMSULOSIN HYDROCHLORIDE 0.4 MILLIGRAM(S): 0.4 CAPSULE ORAL at 21:17

## 2022-11-09 NOTE — BH INPATIENT PSYCHIATRY DISCHARGE NOTE - NSDCCPCAREPLAN_GEN_ALL_CORE_FT
PRINCIPAL DISCHARGE DIAGNOSIS  Diagnosis: Bipolar 1 disorder  Assessment and Plan of Treatment: Please continue current managent and follow-up with your outpatient provider      SECONDARY DISCHARGE DIAGNOSES  Diagnosis: DM (diabetes mellitus)  Assessment and Plan of Treatment: Please continue current managent and follow-up with your outpatient provider

## 2022-11-09 NOTE — BH TREATMENT PLAN - NSBHPRIMARYDX_PSY_ALL_CORE
Bipolar affective disorder, current episode manic    

## 2022-11-09 NOTE — BH INPATIENT PSYCHIATRY PROGRESS NOTE - MSE UNSTRUCTURED FT
Patient appears stated age, is overweight, with some improvement in grooming and hygiene. He is cooperative with interview questions with fair eye contact. No abnormal movements appreciated. No psychomotor agitation or retardation noted. Steady gait observed. Speech is fluent, spontaneous. Reported mood "I feel fine" with constricted but stable affect. Thought Process is linear today. No delusions reported today. Patient denies SI/HI. No perceptual disturbances. Insight and judgement are improving. Impulse control intact at the time of exam. Grossly oriented in all spheres.

## 2022-11-09 NOTE — BH TREATMENT PLAN - NSTXDISORGGOAL_PSY_ALL_CORE
Will identify 2 coping skills that assist in organizing
Will demonstrate purposeful and predictable thoughts/behaviors by making a request
Will demonstrate related thoughts for 5 min in conversation

## 2022-11-09 NOTE — BH INPATIENT PSYCHIATRY DISCHARGE NOTE - NSBHMETABOLIC_PSY_ALL_CORE_FT
BMI: BMI (kg/m2): 37.1 (10-29-22 @ 16:08)  HbA1c: A1C with Estimated Average Glucose Result: 5.7 % (10-08-22 @ 10:20)    Glucose: POCT Blood Glucose.: 162 mg/dL (11-09-22 @ 12:04)    BP: --  Lipid Panel: Date/Time: 10-08-22 @ 10:20  Cholesterol, Serum: 151  Direct LDL: --  HDL Cholesterol, Serum: 54  Total Cholesterol/HDL Ration Measurement: --  Triglycerides, Serum: 73   BMI: BMI (kg/m2): 37.1 (10-29-22 @ 16:08)  HbA1c: A1C with Estimated Average Glucose Result: 5.7 % (10-08-22 @ 10:20)  Glucose: POCT Blood Glucose.: 162 mg/dL (11-09-22 @ 12:04)  Lipid Panel: Date/Time: 10-08-22 @ 10:20  Cholesterol, Serum: 151  HDL Cholesterol, Serum: 54  Triglycerides, Serum: 73

## 2022-11-09 NOTE — BH INPATIENT PSYCHIATRY DISCHARGE NOTE - OTHER PAST PSYCHIATRIC HISTORY (INCLUDE DETAILS REGARDING ONSET, COURSE OF ILLNESS, INPATIENT/OUTPATIENT TREATMENT)
Patient is a 67 year man currently residing at [facility] with PPH Bipolar 1 Disorder, history of multiple inpatient admissions- last as few years ago at [place] per brother. Hx of multiple past suicide attempts- none recent. + history of aggressive behavior in the context of non compliance with medication. No legal issues or substance use issues. Pt was  BIBA referred by SNF for agitation. His mother, who resided at the same nursing home,  5 months ago. Pt's sister, who has unknown mental illness, visited pt and may have triggered him.  PPH Bipolar 1 Disorder, history of multiple inpatient admissions- last as few years ago at [place] per brother. Hx of multiple past suicide attempts- none recent.

## 2022-11-09 NOTE — BH INPATIENT PSYCHIATRY DISCHARGE NOTE - HOSPITAL COURSE
As per admission documentation: "Patient states "I'm on lithium and Seroquel, I don't know why I am here". He cannot give an account of events leading up to admission to [facility]. Was informed he has COVID and he denies this. States he is irritable but feeling very good, and affect is more elevated than irritable. States he was a  in his country and works as a  now.  Gives pseudonym to go by.  Denies living in [facility] but says he lives in [place] by himself. He is alert and oriented in all spheres but did not understand this was a psychiatric hospital.  Denies SI.  Denies depressed mood. Denies AH. Denies physical complaints related to COVID or otherwise (neg ROS). Perseverates on not liking certain doctors.    On exam, continues to have grandiose delusions, elevated mood, pressured speech, and flight of ideas, consistent with daryl. No SI/HI. No auditory or visual hallucinations. Remains grandiose despite 0.7 lithium level on current dose and Seroquel 450mg.  Given good behavioral control, will allow more time on med before increasing Li dose or adding VPA.  No signs of Li toxicity on exam. On initial evaluation, patient was irritable, and grandiose with pressured speech. He reported that he was on lithium and Seroquel and taking his meds and didn’t know why he was here. He was unable to give an account of events leading up to admission. He stated that he was a  [place] country and works as a  now. Patient denied living at the SNF instead stating that he lives in [place] by himself. He denied depressed mood. No auditory or visual hallucinations reported. Home medications lithium and quetiapine were continued. However, given poor compliance, decision was made to switch him to risperidone with the intention of converting to WATTS. Patient was started on risperidone, with significant clinical improvement. He subsequently received his 1st dose of Invega Sustenna 234mg IM on 11/04 and his second dose of 156mg on 11/11, which he tolerated well. Patient’s manic symptoms gradually resolved, and he was observed participating in group activities and interacting with staff and peers.   At present, patient’s symptoms have improved significantly on current management and patient has maximized his treatment in an inpatient setting and is psychiatrically stable to continue treatment in an outpatient setting. Patient's case was discussed with all members of interdisciplinary team and when everyone agreed that patient was stable and appropriate for discharge, he was discharged back to his nursing home. Suicide risk assessment was performed on the day of discharge. Patient denied all suicidal ideation, intent, and plan, and, in view of demonstrated clinical improvement, is not judged to be an acute danger to self or others at this time. Prior to discharge, the importance of treatment compliance was reiterated. Patient verbalized understanding of the matter and agreed with his discharge plans. Patient was instructed on actions for crisis situations, understood and agreed to follow instructions for handling crisis, including coming to ER or calling 911.  Psychotropic medications on discharge: Quetiapine 50 at bedtime, melatonin 3mg at bedtime, lithium SR 900mg at bedtime, and Invega Sustenna 156mg Q4 weeks (next due on 12/09/22)   On initial evaluation, patient was irritable, and grandiose with pressured speech. He reported that he was on lithium and Seroquel and taking his meds and didn’t know why he was here. He was unable to give an account of events leading up to admission. He stated that he was a  [place] and works as a  now. Patient denied living at the SNF instead stating that he lives in [place] by himself. He denied depressed mood. No auditory or visual hallucinations reported. Home medications lithium and quetiapine were continued. However, given poor compliance, decision was made to switch him to risperidone with the intention of converting to WATTS. Patient was started on risperidone, with significant clinical improvement. He subsequently received his 1st dose of Invega Sustenna 234mg IM on 11/04 and his second dose of 156mg on 11/11, which he tolerated well. Patient’s manic symptoms gradually resolved, and he was observed participating in group activities and interacting with staff and peers.   At present, patient’s symptoms have improved significantly on current management and patient has maximized his treatment in an inpatient setting and is psychiatrically stable to continue treatment in an outpatient setting. Patient's case was discussed with all members of interdisciplinary team and when everyone agreed that patient was stable and appropriate for discharge, he was discharged back to his nursing home. Suicide risk assessment was performed on the day of discharge. Patient denied all suicidal ideation, intent, and plan, and, in view of demonstrated clinical improvement, is not judged to be an acute danger to self or others at this time. Prior to discharge, the importance of treatment compliance was reiterated. Patient verbalized understanding of the matter and agreed with his discharge plans. Patient was instructed on actions for crisis situations, understood and agreed to follow instructions for handling crisis, including coming to ER or calling 911.  Of note, patient tested positive for COVID-19 on 10/11/22, but tested negative at the time of discharge (last teated on 11/10/22)  Psychotropic medications on discharge: Quetiapine 50mg at bedtime, melatonin 3mg at bedtime, lithium SR 900mg at bedtime, and Invega Sustenna 156mg Q4 weeks (next due on 12/09/22)

## 2022-11-09 NOTE — BH INPATIENT PSYCHIATRY PROGRESS NOTE - NSBHFUPINTERVALHXFT_PSY_A_CORE
Patient seen for follow-up for daryl. Chart reviewed and case discussed with interdisciplinary team. No significant overnight event reported. On exam, patient reports that he feels "fine" and once again inquires about his WATTS. Writer informs patient that he'll get his 2nd Invega Sustenna injection this Friday. No new concerns reported by patient. Patient denies any hallucinations. His appetite and sleep are adequate. The patient denies any thought of hurting self or others. His vitals are stable.

## 2022-11-09 NOTE — BH TREATMENT PLAN - NSPTSTATEDGOAL_PSY_ALL_CORE
None reported - pt. did not want to talk to writer
"I want to just get out"
"I want to just get out"

## 2022-11-09 NOTE — BH TREATMENT PLAN - NSTXDISORGINTERPR_PSY_ALL_CORE
Over the next seven days, the psychiatric rehabilitation team will meet with patient to support transition to the hospital and utilize individual and group therapy to assist patient in reaching the established psych rehab goal until discharge.
Patient has demonstrated some progress towards psychiatric rehabilitation goal of completing a safety plan containing at least 2 warning signs and 5 coping strategies for improved symptom management and sustained recovery. Patient presents with limited insight into reason for admission and treatment progress. Patient was able to identify watching TV as a distraction technique. Psychiatric rehabilitation recommends patient continue to attend groups, engage in individual sessions, and participate in activities in the milieu to continue exploring warning signs and coping skills to manage symptoms.
Pt was seen 1:1. He was able to meet his psych rehab goal through remaining engaged in a conversation regarding his safety plan for over 5 minutes. Pt is more logical and linear than the prior week, however, remains disorganized, tangential, and grandiose.     Over the next week, pt will work on completing a safety plan containing at least 2 warning signs and 5 coping strategies for improved symptom management and sustained recovery.

## 2022-11-09 NOTE — BH TREATMENT PLAN - NSTXDISORGINTERRN_PSY_ALL_CORE
Assess pt for signs and symptoms of disorganization, redirect and reorient as necessary, provide support, maintain safety, explore healthy coping skills, identify triggers, encourage pt to participate in groups and unit activities, administer and educate on ordered medications
Maintain consistency and reinforce unit routine. Reinforce treatment plan. Encourage participation in unit activities.
Maintain consistency and reinforce unit routine. Reinforce treatment plan. Encourage participation in unit activities.

## 2022-11-09 NOTE — BH INPATIENT PSYCHIATRY DISCHARGE NOTE - NSDCMRMEDTOKEN_GEN_ALL_CORE_FT
aspirin 81 mg oral delayed release tablet: 1 tab(s) orally once a day  Flomax 0.4 mg oral capsule: 1 cap(s) orally once a day  Lantus 100 units/mL subcutaneous solution: 20 unit(s) subcutaneous once a day (at bedtime)  lithium 300 mg oral capsule: 1 cap(s) orally 3 times a day  melatonin 3 mg oral tablet: 1 tab(s) orally once a day (at bedtime)  paliperidone: 156 milligram(s) intramuscular every 4 weeks  QUEtiapine 50 mg oral tablet: 1 tab(s) orally once a day (at bedtime)   aspirin 81 mg oral delayed release tablet: 1 tab(s) orally once a day  Flomax 0.4 mg oral capsule: 1 cap(s) orally once a day  Lantus 100 units/mL subcutaneous solution: 20 unit(s) subcutaneous once a day (at bedtime)  lithium 300 mg oral capsule: 1 cap(s) orally once a day (at bedtime)  melatonin 3 mg oral tablet: 1 tab(s) orally once a day (at bedtime)  paliperidone: 156 milligram(s) intramuscular every 4 weeks  QUEtiapine 50 mg oral tablet: 1 tab(s) orally once a day (at bedtime)   aspirin 81 mg oral delayed release tablet: 1 tab(s) orally once a day  Flomax 0.4 mg oral capsule: 1 cap(s) orally once a day  Lantus 100 units/mL subcutaneous solution: 20 unit(s) subcutaneous once a day (at bedtime)  lithium 300 mg oral capsule: 3 cap(s) orally once a day (at bedtime)  melatonin 3 mg oral tablet: 1 tab(s) orally once a day (at bedtime)  paliperidone: 156 milligram(s) intramuscular every 4 weeks  QUEtiapine 50 mg oral tablet: 1 tab(s) orally once a day (at bedtime)

## 2022-11-09 NOTE — BH INPATIENT PSYCHIATRY PROGRESS NOTE - NSBHMETABOLIC_PSY_ALL_CORE_FT
BMI: BMI (kg/m2): 37.1 (10-29-22 @ 16:08)  HbA1c: A1C with Estimated Average Glucose Result: 5.7 % (10-08-22 @ 10:20)  Glucose: POCT Blood Glucose.: 162 mg/dL (11-09-22 @ 12:04)  Lipid Panel: Date/Time: 10-08-22 @ 10:20  Cholesterol, Serum: 151  HDL Cholesterol, Serum: 54  Triglycerides, Serum: 73

## 2022-11-09 NOTE — BH INPATIENT PSYCHIATRY PROGRESS NOTE - NSBHASSESSSUMMFT_PSY_ALL_CORE
67-year-old man currently residing at [facility]. PPH of Bipolar 1 Disorder. Hx of several inpatient admissions- last as few years ago at [place] per brother. Hx of multiple past suicide attempts- none recent. + history of aggressive behavior in the context of noncompliance with medication. PMH- DM2 (insulin dependent), constipation, generalized muscle weakness, BPH. Patient BIBA referred by [facility] for agitation, admitted at [facility] initially and found to be Covid+ on 10/11, now admitted at [facility] for ongoing management of daryl.  Presents with grandiosity, elevated/irritable mood, FOI.     Patient transferred to [facility] from  on 10/26.     At present, patient carries a diagnosis of schizoaffective disorder and is unable to care for self on his own for his ADLs and medical needs and requires return placement to a skilled nursing care facility and an assisted living facility is not enough. He has been observed participating in groups and will benefit form group therapy and activities after discharge.    11/09 Clinical Update  No acute event overnight. Patient is stable and at his baseline as his daryl has largely resolved. He is agreeable to going back to [place] now. There have been no incidents of self-harm since admission. Patient received his 1st dose of Invega Sustenna 234mg IM on 11/04; no pain reported at injection site.; next dose is due on 11/11.      Plan:  - Will administer 2nd dose of 156mg IM on 11/11.  - Will continue Lithobid 900mg qhs for daryl  - Will continue melatonin 3mg Oral at bedtime for sleep  - Will continue quetiapine 100mg at bedtime for mood stabilization  - Will continue pocket and mouth checks for medication  - Will continue medications for medical comorbidities-- COVID-19: Asymptomatic, will continue to monitor; DM2: Lantus 20mg qHS; ISS: sodium bicarbonate 650mg BID; BPH: tamsulosin 0.4mg at bedtime; aspirin enteric coated 81mg daily  - Dispo: discharge back to [facility] projected for either on Friday or early next week.

## 2022-11-09 NOTE — BH TREATMENT PLAN - NSTXDISORGINTERMD_PSY_ALL_CORE
Continued management with Q 4 weeks WATTS Invega Sustenna
Continued management with risperidone with the aim to convert to WATTS

## 2022-11-09 NOTE — BH TREATMENT PLAN - NSTXPATIENTPARTICIPATE_PSY_ALL_CORE
No, patient unwilling to participate
Patient participated in identification of needs/problems/goals for treatment
Patient participated in identification of needs/problems/goals for treatment/Patient participated in defining interventions/Patient participated in development of after care plan

## 2022-11-09 NOTE — BH INPATIENT PSYCHIATRY DISCHARGE NOTE - HPI (INCLUDE ILLNESS QUALITY, SEVERITY, DURATION, TIMING, CONTEXT, MODIFYING FACTORS, ASSOCIATED SIGNS AND SYMPTOMS)
Patient is a 67-year-man currently residing at [facility]. PPH Bipolar 1 Disorder. Hx of many inpatient admissions- last as few years ago at [place] per brother. Hx of multiple past suicide attempts- none recent. + history of aggressive behavior in the context of non compliance with medication. No legal issues or substance use issues. PMH- DM2 (insulin dependent), constipation, generalized muscle weakness, BPH. BIBA referred by SNF for agitation, admitted at [facility] initially and found to be COVID+ on 10/11, now admitted at [facility] for ongoing management of daryl.     Patient states "I'm on lithium and Seroquel, I don't know why I am here."  He cannot give an account of events leading up to admission to [facility].  Was informed he has COVID and he denies this.  States he is irritable but feeling very good, and affect is more elevated than irritable.  States he was a  in his [place] and works as a  now.  Gives pseudonym to go by.  Denies living in [facility] but says he lives in [place] by himself.  He is alert and oriented in all spheres but did not understand this was a psychiatric hospital.  Denies SI.  Denies depressed mood.  Denies AH.  Denies physical complaints related to COVID or otherwise (neg ROS). Perseverates on not liking certain doctors.    Per initial assessment in ED, findings confirmed: "Met with patient in room. He was lying down mumbling to self. He stated "porn is bad!" He reports he was brought to the ED "for a better life," and nothing is wrong with him. He was disorganized and challenging to interview. He denied SI/HI/SIB/intent/plan. He became agitated when asked about other symptoms "I'm not crazy, get out of here!" He then asked if evaluator was a prostitute and said "get out!".   Spoke with Brother Harshad- He reports patient has been on lithium most of his life and does well when he is compliant. He is disabled- history of multiple psychiatric hospitalizations and suicide attempts. He lives in SNF due to his limitations from his mental illness. He is extremely intelligent and "kind man" when he is compliant and responds well to his medication regimen. Mother  5 months ago and was at [facility] as well. This has been hard for the patient. Patient's sister is also "mentally unwell" and may have triggered patient when she visited. Brother reports he uses a cane/walker. He does not have good balance. Patient requires assistance with ADLs- toileting/dressing self. He stated patient needs his food cut but he isn't sure. He is vaccinated- 2 boosters.   Spoke with nurse Fischer- Patient has a regular bed -[facility] hospital. He uses a walker, he is a 1 person assist, patient is on regular liquids and full regular diabetic diet. Patient uses a urinal and has a diaper "on sometimes." She reports currently patient needs assistance with all ADLs and tolieting/eating due to his behavior. At baseline he is independent." Patient is a 67-year-man currently residing at [facility]. PPH Bipolar 1 Disorder. Hx of many inpatient admissions- last as few years ago at [place] per brother. Hx of multiple past suicide attempts- none recent. + history of aggressive behavior in the context of non compliance with medication. No legal issues or substance use issues. PMH- DM2 (insulin dependent), constipation, generalized muscle weakness, BPH. BIBA referred by SNF for agitation, admitted at [facility] initially and found to be COVID+ on 10/11, once stable admitted at [facility] for ongoing management of daryl.     Per initial assessment in ED, findings confirmed: "Met with patient in room. He was lying down mumbling to self. He stated "porn is bad!" He reports he was brought to the ED "for a better life," and nothing is wrong with him. He was disorganized and challenging to interview. He denied SI/HI/SIB/intent/plan. He became agitated when asked about other symptoms "I'm not crazy, get out of here!" He then asked if evaluator was a prostitute and said "get out!".   Spoke with Brother Harshad- He reports patient has been on lithium most of his life and does well when he is compliant. He is disabled- history of multiple psychiatric hospitalizations and suicide attempts. He lives in SNF due to his limitations from his mental illness. He is extremely intelligent and "kind man" when he is compliant and responds well to his medication regimen. Mother  5 months ago and was at [facility] as well. This has been hard for the patient. Patient's sister is also "mentally unwell" and may have triggered patient when she visited. Brother reports he uses a cane/walker. He does not have good balance. Patient requires assistance with ADLs- toileting/dressing self. He stated patient needs his food cut but he isn't sure. He is vaccinated- 2 boosters.   Spoke with nurse Fischer- Patient has a regular bed -[facility] hospital. He uses a walker, he is a 1 person assist, patient is on regular liquids and full regular diabetic diet. Patient uses a urinal and has a diaper "on sometimes." She reports currently patient needs assistance with all ADLs and tolieting/ eating due to his behavior. At baseline he is independent."

## 2022-11-09 NOTE — BH TREATMENT PLAN - NSDCCRITERIA_PSY_ALL_CORE
When pt is no longer an acute or imminent risk of harm to self or others, and is able to care for self safely, pt may then be discharged. 

## 2022-11-09 NOTE — BH TREATMENT PLAN - NSTXDCOPLKINTERSW_PSY_ALL_CORE
SW provides support, psychoed, contact with colllaterals, discharge planning and collaboration with treatment team.
tamara interfaced with pt/ family and team. pt will return to prior placement with prior psychiatrist on site. tamara arranged for  [facility] .  approved.  pt needed the support of his brother to sign releases.  brother reportedly advised MD of his plan to visit 11/8 - tamara prepared forms and placed in pt chart. tamara called brother at 2pm on 11/8 to advise- cooincidentally brother was on unit to help pt with signatures.. sw came to unit. pt signed releases to SNF, [facility] finalized SCREEN, pt gave his copy of [facility] disposition to his brother.   Pt has [facility] planned for 11/11. sw requested updated CL, sw will  submit return referral to SNF on 11/9. tamara will advised mltc when dc date is known.
(4) no impairment

## 2022-11-09 NOTE — BH TREATMENT PLAN - NSTXPLANTHERAPYSESSIONSFT_PSY_ALL_CORE
11-08-22  Type of therapy: Coping skills,Creative arts therapy,Leisure development,Peer advocate,Spirituality  Type of session: Individual  Level of patient participation: Engaged  Duration of participation: 15 minutes  Therapy conducted by: Psych rehab  Therapy Summary: Writer met with patient for an individual session in order to review progress towards psychiatric rehabilitation goals. Patient was pleasant and moderately engaged in the session. Patient has demonstrated some improvements in symptoms. Patient demonstrates more organized thought process overall, though concrete, and reported fair mood. Patient presents with limited insight into reason for admission and current presentation. Patient has been adherent to medication and reported no side effects. Patient endorsed fair sleep and appetite. Patient denied suicidal/homicidal ideation. Patient denied auditory/visual hallucinations. Due to the COVID-19 pandemic, unit structure and activities are re-evaluated on a consistent basis in effort to maintain the safety of patients and staff. Patient attends some psychiatric rehabilitation groups and engages appropriately during sessions. Patient is visible on the unit and maintains fair behavioral control. Patient is minimally social with peers and cooperative with staff. Patient presents as malodourous with poor ADLs; he was encouraged to shower and wash his clothing and responded that he would tomorrow.  
  10-27-22  Type of therapy: Leisure development,Peer advocate  Type of session: Individual  Level of patient participation: Not engaged  Duration of participation: Less than 15 minutes  Therapy conducted by: Psych rehab  Therapy Summary: Writer attempted to meet with patient to assess progress of psychiatric rehabilitation goal over the past seven days. However, at this time, patient is unable to meaningfully engage in session as patient demonstrates poor boundaries and disorganization. Patient was recently transferred from  (Covid unit) to . Per chart, patient is observed to be acclimating well into the new unit. Past week, patient was observed to be “pocketing his medication” and hiding medication in his room. When queried, patient is irritable and responds in elevated manner. Patient continues to exhibit manic symptoms as evidenced by hyperverbal speech and grandiosity. In addition, patient was noted to exhibit mood lability. Patient presents with poor insight to his symptoms. SI, HI, AH, and VH could not be assessed. Patient’s ADLs are poor. Patient is visible in the milieu and minimally social with peers. Psychiatric rehabilitation team will continue to engage with patient daily to provide therapeutic support.    11-01-22  Type of therapy: Coping skills,Creative arts therapy,Health and fitness,Leisure development,Mindfulness,Peer advocate  Type of session: Individual  Level of patient participation: Engaged  Duration of participation: 15 minutes  Therapy conducted by: Psych rehab  Therapy Summary: Pt was seen 1:1. Pt is more logical and linear than the prior week, however, remains disorganized, tangential, hyperverbal, and grandiose. Pt reports having a diagnosis of bipolar, however, currently reports being at baseline and denies symptoms of daryl. Pt’s behavioral control has improved. Pt has attended a fair portion of groups where he is pleasant and moderately engaged. At times pt requires limit setting or re-direction for boundaries; pt has been responsive to re-direction. Per chart review, pt become irritable at times. He is now complying with his medication. He hasn’t been engaging in ADLs despite encouragement of nursing staff. Today, pt was encouraged to wash his sweater, which was noted to be dirty; pt was resistant to this suggestion. Patient is visible in the milieu and minimally social with peers. Pt is focused on discharge; he believes he will live in his brother’s condo and dismissed the idea of returning to a subacute setting.  Pt exhibits poor insight and judgement. Psychiatric rehabilitation team will continue to engage with patient daily to provide therapeutic support.

## 2022-11-09 NOTE — BH INPATIENT PSYCHIATRY DISCHARGE NOTE - NSBHDCHANDOFFFT_PSY_ALL_CORE
Provider left a message for outpatient provider Dr. Garrett at [phone] requesting call back to writer (Dr. Lopez) at [phone] to discuss patient's  hospital course and management.

## 2022-11-09 NOTE — BH INPATIENT PSYCHIATRY DISCHARGE NOTE - NSBHDCMEDICALFT_PSY_A_CORE
COVID-19: Asymptomatic, will continue to monitor; DM2: Lantus 20mg qHS; ISS: sodium bicarbonate 650mg BID; BPH: tamsulosin 0.4mg at bedtime; aspirin enteric coated 81mg daily

## 2022-11-10 LAB
GLUCOSE BLDC GLUCOMTR-MCNC: 115 MG/DL — HIGH (ref 70–99)
GLUCOSE BLDC GLUCOMTR-MCNC: 136 MG/DL — HIGH (ref 70–99)
GLUCOSE BLDC GLUCOMTR-MCNC: 138 MG/DL — HIGH (ref 70–99)
GLUCOSE BLDC GLUCOMTR-MCNC: 146 MG/DL — HIGH (ref 70–99)
SARS-COV-2 RNA SPEC QL NAA+PROBE: SIGNIFICANT CHANGE UP

## 2022-11-10 PROCEDURE — 99231 SBSQ HOSP IP/OBS SF/LOW 25: CPT

## 2022-11-10 RX ADMIN — Medication 81 MILLIGRAM(S): at 08:37

## 2022-11-10 RX ADMIN — Medication 650 MILLIGRAM(S): at 08:37

## 2022-11-10 RX ADMIN — Medication 650 MILLIGRAM(S): at 20:47

## 2022-11-10 RX ADMIN — LITHIUM CARBONATE 900 MILLIGRAM(S): 300 TABLET, EXTENDED RELEASE ORAL at 20:46

## 2022-11-10 RX ADMIN — Medication 3 MILLIGRAM(S): at 20:46

## 2022-11-10 RX ADMIN — QUETIAPINE FUMARATE 50 MILLIGRAM(S): 200 TABLET, FILM COATED ORAL at 20:47

## 2022-11-10 RX ADMIN — INSULIN GLARGINE 20 UNIT(S): 100 INJECTION, SOLUTION SUBCUTANEOUS at 21:34

## 2022-11-10 RX ADMIN — TAMSULOSIN HYDROCHLORIDE 0.4 MILLIGRAM(S): 0.4 CAPSULE ORAL at 20:47

## 2022-11-10 NOTE — BH INPATIENT PSYCHIATRY PROGRESS NOTE - NSBHFUPINTERVALHXFT_PSY_A_CORE
Patient seen for follow-up for daryl. Chart reviewed and case discussed with interdisciplinary team. No significant overnight event reported. On exam, patient reports that he feels okay and states that he hopes he can leave soon. No new concerns reported by patient. Patient denies any hallucinations. His appetite and sleep are adequate. The patient denies any thought of hurting self or others. His vitals are stable.

## 2022-11-10 NOTE — BH INPATIENT PSYCHIATRY PROGRESS NOTE - MSE UNSTRUCTURED FT
Patient appears stated age, is overweight, with some improvement in grooming and hygiene. He is cooperative with interview questions with fair eye contact. No abnormal movements appreciated. No psychomotor agitation or retardation noted. Steady gait observed. Speech is fluent, spontaneous. Reported mood "okay" with constricted but stable affect. Thought Process is linear today. No delusions reported today. Patient denies SI/HI. No perceptual disturbances. Insight and judgement are improving. Impulse control intact at the time of exam. Grossly oriented in all spheres.

## 2022-11-10 NOTE — BH DISCHARGE NOTE NURSING/SOCIAL WORK/PSYCH REHAB - PATIENT PORTAL LINK FT
You can access the FollowMyHealth Patient Portal offered by [facility] by registering at the following website: [email or web address]. By joining [employer]’s FollowMyHealth portal, you will also be able to view your health information using other applications (apps) compatible with our system.

## 2022-11-10 NOTE — BH DISCHARGE NOTE NURSING/SOCIAL WORK/PSYCH REHAB - NSDCPRRECOMMEND_PSY_ALL_CORE
Psychiatric rehabilitation staff recommends that patient follow-up with aftercare and participate in structured leisure activities.

## 2022-11-10 NOTE — BH INPATIENT PSYCHIATRY PROGRESS NOTE - NSBHASSESSSUMMFT_PSY_ALL_CORE
67-year-old man currently residing at [facility]. PPH of Bipolar 1 Disorder. Hx of several inpatient admissions- last as few years ago at [place] per brother. Hx of multiple past suicide attempts- none recent. + history of aggressive behavior in the context of noncompliance with medication. PMH- DM2 (insulin dependent), constipation, generalized muscle weakness, BPH. Patient BIBA referred by SNF for agitation, admitted at [facility] initially and found to be Covid+ on 10/11, now admitted at [facility] for ongoing management of daryl.  Presents with grandiosity, elevated/irritable mood, FOI.     Patient transferred to [facility] from  on 10/26.     At present, patient carries a diagnosis of schizoaffective disorder and is unable to care for self on his own for his ADLs and medical needs and requires return placement to a skilled nursing care facility and an assisted living facility is not enough. He has been observed participating in groups and will benefit form group therapy and activities after discharge.    11/10 Clinical Update  No new concerns. Patient is stable and at his baseline as his daryl has largely resolved. He is agreeable to going back to [facility] now. There have been no incidents of self-harm since admission. Patient received his 1st dose of Invega Sustenna 234mg IM on 11/04; no pain reported at injection site.; next dose is due tomorrow 11/11.      Plan:  - Will administer 2nd dose of 156mg IM on 11/11.  - Will continue Lithobid 900mg qhs for daryl  - Will continue melatonin 3mg Oral at bedtime for sleep  - Will continue quetiapine 100mg at bedtime for mood stabilization  - Will continue pocket and mouth checks for medication  - Will continue medications for medical comorbidities-- COVID-19: Asymptomatic, will continue to monitor; DM2: Lantus 20mg qHS; ISS: sodium bicarbonate 650mg BID; BPH: tamsulosin 0.4mg at bedtime; aspirin enteric coated 81mg daily  - Dispo: discharge back to [facility] scheduled for tomorrow afternoon.

## 2022-11-10 NOTE — BH DISCHARGE NOTE NURSING/SOCIAL WORK/PSYCH REHAB - DISCHARGE INSTRUCTIONS AFTERCARE APPOINTMENTS
In order to check the location, date, or time of your aftercare appointment, please refer to your Discharge Instructions Document given to you upon leaving the hospital.  If you have lost the instructions please call [phone]

## 2022-11-10 NOTE — BH INPATIENT PSYCHIATRY PROGRESS NOTE - NSBHMETABOLIC_PSY_ALL_CORE_FT
BMI: BMI (kg/m2): 37.1 (10-29-22 @ 16:08)  HbA1c: A1C with Estimated Average Glucose Result: 5.7 % (10-08-22 @ 10:20)  Glucose: POCT Blood Glucose.: 138 mg/dL (11-10-22 @ 12:12)  Lipid Panel: Date/Time: 10-08-22 @ 10:20  Cholesterol, Serum: 151  HDL Cholesterol, Serum: 54  Triglycerides, Serum: 73

## 2022-11-10 NOTE — BH DISCHARGE NOTE NURSING/SOCIAL WORK/PSYCH REHAB - NSDCPRGOAL_PSY_ALL_CORE
Writer met with pt prior to discharge. Pt was provided with a press-ganey survey. Pt was pleasant and moderately engaged in the session. Pt demonstrated some improvements in symptoms. Pt exhibited a more organized thought process; however, remains concrete with poor insight. He identified his mood as good. Pt denied all illness symptoms including SI/HI and AH/VH. Patient has been adherent to medication and reported no side effects. Patient endorsed fair sleep and appetite. Pt attends a fair portion of psychiatric rehabilitation groups and engaged appropriately during sessions in the two weeks prior to discharge. Pt was visible on the unit and maintained fair behavioral control. Patient was minimally social with peers and cooperative with staff. Patient presented with fair ADLs.

## 2022-11-11 VITALS — OXYGEN SATURATION: 95 % | TEMPERATURE: 98 F

## 2022-11-11 LAB
GLUCOSE BLDC GLUCOMTR-MCNC: 111 MG/DL — HIGH (ref 70–99)
GLUCOSE BLDC GLUCOMTR-MCNC: 137 MG/DL — HIGH (ref 70–99)

## 2022-11-11 PROCEDURE — 99231 SBSQ HOSP IP/OBS SF/LOW 25: CPT

## 2022-11-11 RX ADMIN — Medication 650 MILLIGRAM(S): at 08:26

## 2022-11-11 RX ADMIN — Medication 81 MILLIGRAM(S): at 08:26

## 2022-11-11 RX ADMIN — PALIPERIDONE 156 MILLIGRAM(S): 1.5 TABLET, EXTENDED RELEASE ORAL at 09:00

## 2022-11-11 NOTE — BH INPATIENT PSYCHIATRY PROGRESS NOTE - NSBHCHARTREVIEWVS_PSY_A_CORE FT
Vital Signs Last 24 Hrs  T(C): 36.1 (10-27-22 @ 05:36), Max: 36.2 (10-26-22 @ 18:23)  T(F): 96.9 (10-27-22 @ 05:36), Max: 97.2 (10-26-22 @ 18:23)    Orthostatic VS  10-27-22 @ 05:36  Sitting BP: 113/60 HR: 64  Standing BP: 105/76 HR: 75  
Vital Signs Last 24 Hrs  T(C): 36.3 (10-25-22 @ 06:31), Max: 36.3 (10-25-22 @ 06:31)  T(F): 97.4 (10-25-22 @ 06:31), Max: 97.4 (10-25-22 @ 06:31)  HR: --  BP: --  BP(mean): --  RR: --  SpO2: --    Orthostatic VS  10-25-22 @ 06:31  Lying BP: --/-- HR: --  Sitting BP: 119/75 HR: 78  Standing BP: 111/77 HR: 84  Site: --  Mode: --  Orthostatic VS  10-24-22 @ 19:16  Lying BP: --/-- HR: --  Sitting BP: 115/69 HR: 74  Standing BP: 129/74 HR: 80  Site: --  Mode: --  Orthostatic VS  10-23-22 @ 19:06  Lying BP: --/-- HR: --  Sitting BP: 127/69 HR: 72  Standing BP: 130/79 HR: 86  Site: --  Mode: --  
Vital Signs Last 24 Hrs  T(C): 36.5 (10-21-22 @ 07:31), Max: 36.6 (10-20-22 @ 17:22)  T(F): 97.7 (10-21-22 @ 07:31), Max: 97.9 (10-20-22 @ 17:22)  HR: --  BP: --  BP(mean): --  RR: --  SpO2: --    Orthostatic VS  10-21-22 @ 07:31  Lying BP: --/-- HR: --  Sitting BP: 109/60 HR: 76  Standing BP: 100/72 HR: 85  Site: --  Mode: --  Orthostatic VS  10-20-22 @ 19:29  Lying BP: --/-- HR: --  Sitting BP: 98/60 HR: 85  Standing BP: 99/58 HR: 90  Site: --  Mode: --  Orthostatic VS  10-20-22 @ 07:16  Lying BP: --/-- HR: --  Sitting BP: 101/65 HR: 76  Standing BP: 100/62 HR: 85  Site: --  Mode: --  Orthostatic VS  10-19-22 @ 19:48  Lying BP: --/-- HR: --  Sitting BP: 117/64 HR: 85  Standing BP: 104/80 HR: 93  Site: --  Mode: --  Orthostatic VS  10-19-22 @ 16:32  Lying BP: --/-- HR: --  Sitting BP: 97/71 HR: 98  Standing BP: 99/62 HR: 101  Site: --  Mode: --  
Vital Signs Last 24 Hrs  T(C): 36.7 (10-30-22 @ 08:41), Max: 36.8 (10-29-22 @ 16:08)  T(F): 98 (10-30-22 @ 08:41), Max: 98.3 (10-29-22 @ 16:08)  HR: --  BP: --  BP(mean): --  RR: 17 (10-30-22 @ 08:41) (17 - 17)  SpO2: 99% (10-30-22 @ 08:41) (99% - 99%)    Orthostatic VS  10-30-22 @ 08:41  Lying BP: --/-- HR: --  Sitting BP: 126/79 HR: 74  Standing BP: 134/74 HR: 78  Site: --  Mode: --  Orthostatic VS  10-29-22 @ 05:11  Lying BP: --/-- HR: --  Sitting BP: 99/67 HR: 65  Standing BP: 102/68 HR: 74  Site: upper left arm  Mode: electronic  Orthostatic VS  10-28-22 @ 21:11  Lying BP: --/-- HR: --  Sitting BP: 129/82 HR: 80  Standing BP: 135/75 HR: 94  Site: --  Mode: --  
Vital Signs Last 24 Hrs  T(C): 36.9 (10-23-22 @ 19:06), Max: 36.9 (10-23-22 @ 19:06)  T(F): 98.5 (10-23-22 @ 19:06), Max: 98.5 (10-23-22 @ 19:06)  HR: --  BP: --  BP(mean): --  RR: --  SpO2: --    Orthostatic VS  10-23-22 @ 19:06  Lying BP: --/-- HR: --  Sitting BP: 127/69 HR: 72  Standing BP: 130/79 HR: 86  Site: --  Mode: --  Orthostatic VS  10-23-22 @ 09:18  Lying BP: --/-- HR: --  Sitting BP: 119/69 HR: 66  Standing BP: 110/70 HR: 75  Site: --  Mode: --  Orthostatic VS  10-22-22 @ 18:50  Lying BP: --/-- HR: --  Sitting BP: 135/72 HR: 89  Standing BP: 130/74 HR: 91  Site: --  Mode: --  
Vital Signs Last 24 Hrs  T(C): 36.2 (10-28-22 @ 07:12), Max: 36.8 (10-27-22 @ 16:06)  T(F): 97.2 (10-28-22 @ 07:12), Max: 98.3 (10-27-22 @ 16:06)  SpO2: 99% (10-27-22 @ 16:06) (99% - 99%)    Orthostatic VS  10-28-22 @ 07:12  Sitting BP: 121/69 HR: 79  Standing BP: 124/78 HR: 88
Vital Signs Last 24 Hrs  T(C): 36.1 (10-31-22 @ 05:09), Max: 37.1 (10-30-22 @ 20:51)  T(F): 96.9 (10-31-22 @ 05:09), Max: 98.8 (10-30-22 @ 20:51)  RR: 17 (10-31-22 @ 05:09) (17 - 19)  SpO2: 100% (10-30-22 @ 20:51) (100% - 100%)    Orthostatic VS  10-31-22 @ 05:09  Sitting BP: 119/74 HR: 79  Standing BP: 134/73 HR: 87  Site: upper left arm  Mode: electronic
Vital Signs Last 24 Hrs  T(C): 36.5 (10-29-22 @ 05:11), Max: 37.2 (10-28-22 @ 15:51)  T(F): 97.7 (10-29-22 @ 05:11), Max: 99 (10-28-22 @ 15:51)  HR: --  BP: --  BP(mean): --  RR: 18 (10-29-22 @ 05:11) (18 - 18)  SpO2: 100% (10-28-22 @ 15:51) (100% - 100%)    Orthostatic VS  10-29-22 @ 05:11  Lying BP: --/-- HR: --  Sitting BP: 99/67 HR: 65  Standing BP: 102/68 HR: 74  Site: upper left arm  Mode: electronic  Orthostatic VS  10-28-22 @ 21:11  Lying BP: --/-- HR: --  Sitting BP: 129/82 HR: 80  Standing BP: 135/75 HR: 94  Site: --  Mode: --  Orthostatic VS  10-28-22 @ 07:12  Lying BP: --/-- HR: --  Sitting BP: 121/69 HR: 79  Standing BP: 124/78 HR: 88  Site: --  Mode: --  
Vital Signs Last 24 Hrs  T(C): 36.7 (11-11-22 @ 08:18), Max: 36.7 (11-11-22 @ 08:18)  T(F): 98.1 (11-11-22 @ 08:18), Max: 98.1 (11-11-22 @ 08:18)  HR: 80 (11-10-22 @ 20:30) (80 - 80)  BP: 126/80 (11-10-22 @ 20:30) (126/80 - 126/80)  RR: 18 (11-10-22 @ 20:30) (18 - 18)  SpO2: 95% (11-11-22 @ 08:18) (95% - 97%)    Orthostatic VS  11-11-22 @ 08:18  Lying BP: 102/63 HR: 65  Sitting BP: 124/80 HR: 74
Vital Signs Last 24 Hrs  T(C): 36.6 (10-21-22 @ 17:28), Max: 36.6 (10-21-22 @ 17:28)  T(F): 97.8 (10-21-22 @ 17:28), Max: 97.8 (10-21-22 @ 17:28)  HR: --  BP: --  BP(mean): --  RR: --  SpO2: --    Orthostatic VS  10-22-22 @ 05:14  Lying BP: --/-- HR: --  Sitting BP: 132/77 HR: 87  Standing BP: 146/93 HR: 98  Site: --  Mode: --  Orthostatic VS  10-21-22 @ 20:13  Lying BP: --/-- HR: --  Sitting BP: 146/67 HR: 84  Standing BP: 146/86 HR: 89  Site: --  Mode: --  Orthostatic VS  10-21-22 @ 19:16  Lying BP: --/-- HR: --  Sitting BP: 146/67 HR: 84  Standing BP: 146/86 HR: 89  Site: --  Mode: --  Orthostatic VS  10-21-22 @ 07:31  Lying BP: --/-- HR: --  Sitting BP: 109/60 HR: 76  Standing BP: 100/72 HR: 85  Site: --  Mode: --  Orthostatic VS  10-20-22 @ 19:29  Lying BP: --/-- HR: --  Sitting BP: 98/60 HR: 85  Standing BP: 99/58 HR: 90  Site: --  Mode: --  
Vital Signs Last 24 Hrs  T(C): 36.9 (10-23-22 @ 19:06), Max: 36.9 (10-23-22 @ 19:06)  T(F): 98.5 (10-23-22 @ 19:06), Max: 98.5 (10-23-22 @ 19:06)  HR: --  BP: --  BP(mean): --  RR: --  SpO2: --    Orthostatic VS  10-23-22 @ 19:06  Lying BP: --/-- HR: --  Sitting BP: 127/69 HR: 72  Standing BP: 130/79 HR: 86  Site: --  Mode: --  Orthostatic VS  10-23-22 @ 09:18  Lying BP: --/-- HR: --  Sitting BP: 119/69 HR: 66  Standing BP: 110/70 HR: 75  Site: --  Mode: --  Orthostatic VS  10-22-22 @ 18:50  Lying BP: --/-- HR: --  Sitting BP: 135/72 HR: 89  Standing BP: 130/74 HR: 91  Site: --  Mode: --  Orthostatic VS  10-22-22 @ 05:14  Lying BP: --/-- HR: --  Sitting BP: 132/77 HR: 87  Standing BP: 146/93 HR: 98  Site: --  Mode: --  Orthostatic VS  10-21-22 @ 20:13  Lying BP: --/-- HR: --  Sitting BP: 146/67 HR: 84  Standing BP: 146/86 HR: 89  Site: --  Mode: --  
Vital Signs Last 24 Hrs  T(C): 36.4 (11-01-22 @ 05:48), Max: 36.4 (11-01-22 @ 05:48)  T(F): 97.5 (11-01-22 @ 05:48), Max: 97.5 (11-01-22 @ 05:48)  RR: 16 (11-01-22 @ 05:48) (16 - 16)  SpO2: 98% (11-01-22 @ 05:48) (98% - 98%)    Orthostatic VS  11-01-22 @ 05:48  Sitting BP: 96/62 HR: 69  Standing BP: 105/71 HR: 80
Vital Signs Last 24 Hrs  T(C): 36.4 (11-09-22 @ 08:02), Max: 36.9 (11-08-22 @ 15:35)  T(F): 97.6 (11-09-22 @ 08:02), Max: 98.5 (11-08-22 @ 15:35)  RR: 18 (11-09-22 @ 08:02) (18 - 18)  SpO2: 96% (11-09-22 @ 08:02) (96% - 97%)    Orthostatic VS  11-09-22 @ 08:02  Lying BP: 129/89 HR: 71  Sitting BP: 125/89 HR: 78  Site: upper left arm  Mode: electronic
Vital Signs Last 24 Hrs  T(C): 36.3 (11-03-22 @ 05:28), Max: 36.8 (11-02-22 @ 15:58)  T(F): 97.3 (11-03-22 @ 05:28), Max: 98.3 (11-02-22 @ 15:58)  SpO2: 98% (11-02-22 @ 15:58) (98% - 98%)    Orthostatic VS  11-03-22 @ 05:28  Sitting BP: 95/48 HR: 78  Standing BP: 106/66 HR: 85
Vital Signs Last 24 Hrs  T(C): 36.4 (11-02-22 @ 06:02), Max: 36.9 (11-01-22 @ 16:00)  T(F): 97.6 (11-02-22 @ 06:02), Max: 98.4 (11-01-22 @ 16:00)  SpO2: 98% (11-01-22 @ 16:00) (98% - 98%)    Orthostatic VS  11-02-22 @ 06:02  Sitting BP: 112/67 HR: 73  Standing BP: 106/86 HR: 90
Vital Signs Last 24 Hrs  T(C): 36.3 (10-26-22 @ 09:32), Max: 36.7 (10-25-22 @ 17:17)  T(F): 97.3 (10-26-22 @ 09:32), Max: 98.1 (10-25-22 @ 17:17)  HR: --  BP: --  BP(mean): --  RR: --  SpO2: --    Orthostatic VS  10-26-22 @ 09:32  Lying BP: --/-- HR: --  Sitting BP: 117/70 HR: 76  Standing BP: 130/74 HR: 80  Site: --  Mode: --  Orthostatic VS  10-25-22 @ 19:29  Lying BP: --/-- HR: --  Sitting BP: 140/82 HR: 80  Standing BP: 142/77 HR: 86  Site: --  Mode: --  Orthostatic VS  10-25-22 @ 06:31  Lying BP: --/-- HR: --  Sitting BP: 119/75 HR: 78  Standing BP: 111/77 HR: 84  Site: --  Mode: --  Orthostatic VS  10-24-22 @ 19:16  Lying BP: --/-- HR: --  Sitting BP: 115/69 HR: 74  Standing BP: 129/74 HR: 80  Site: --  Mode: --  
Vital Signs Last 24 Hrs  T(C): 36.9 (11-08-22 @ 08:04), Max: 37.4 (11-07-22 @ 15:56)  T(F): 98.4 (11-08-22 @ 08:04), Max: 99.3 (11-07-22 @ 15:56)  RR: 18 (11-08-22 @ 08:04) (18 - 18)  SpO2: 100% (11-08-22 @ 08:04) (96% - 100%)    11-08-22 @ 08:04  Lying BP: 124/60 HR: 72
Vital Signs Last 24 Hrs  T(C): 36.3 (11-04-22 @ 05:43), Max: 37.8 (11-03-22 @ 15:56)  T(F): 97.3 (11-04-22 @ 05:43), Max: 100 (11-03-22 @ 15:56)  SpO2: 99% (11-03-22 @ 15:56) (99% - 99%)    Orthostatic VS  11-04-22 @ 05:43  Sitting BP: 111/69 HR: 98  Standing BP: 107/67 HR: 74
Vital Signs Last 24 Hrs  T(C): 37 (11-07-22 @ 08:14), Max: 37.2 (11-06-22 @ 15:48)  T(F): 98.6 (11-07-22 @ 08:14), Max: 99 (11-06-22 @ 15:48)  SpO2: 95% (11-07-22 @ 08:14) (95% - 99%)    Orthostatic VS  11-07-22 @ 08:14  Sitting BP: 135/85 HR: 82  Standing BP: 131/77 HR: 87
Vital Signs Last 24 Hrs  T(C): 36.7 (11-10-22 @ 08:21), Max: 36.7 (11-10-22 @ 08:21)  T(F): 98.1 (11-10-22 @ 08:21), Max: 98.1 (11-10-22 @ 08:21)  RR: 18 (11-10-22 @ 08:21) (18 - 18)  SpO2: 98% (11-10-22 @ 08:21) (98% - 98%)    Orthostatic VS  11-10-22 @ 08:21  Sitting BP: 120/82 HR: 86  Standing BP: 123/80 HR: 89  Site: upper right arm  Mode: electronic
Vital Signs Last 24 Hrs  T(C): 36.4 (10-20-22 @ 07:16), Max: 36.6 (10-19-22 @ 16:32)  T(F): 97.6 (10-20-22 @ 07:16), Max: 97.9 (10-19-22 @ 17:26)  HR: --  BP: --  BP(mean): --  RR: 14 (10-19-22 @ 16:32) (14 - 14)  SpO2: --    Orthostatic VS  10-20-22 @ 07:16  Lying BP: --/-- HR: --  Sitting BP: 101/65 HR: 76  Standing BP: 100/62 HR: 85  Site: --  Mode: --  Orthostatic VS  10-19-22 @ 19:48  Lying BP: --/-- HR: --  Sitting BP: 117/64 HR: 85  Standing BP: 104/80 HR: 93  Site: --  Mode: --  Orthostatic VS  10-19-22 @ 16:32  Lying BP: --/-- HR: --  Sitting BP: 97/71 HR: 98  Standing BP: 99/62 HR: 101  Site: --  Mode: --

## 2022-11-11 NOTE — BH INPATIENT PSYCHIATRY PROGRESS NOTE - NSTXDCOPLKDATEEST_PSY_ALL_CORE
19-Oct-2022
19-Oct-2022
03-Nov-2022
19-Oct-2022
03-Nov-2022
19-Oct-2022
20-Oct-2022
19-Oct-2022
03-Nov-2022
20-Oct-2022
03-Nov-2022
03-Nov-2022
19-Oct-2022
20-Oct-2022
20-Oct-2022
03-Nov-2022
20-Oct-2022

## 2022-11-11 NOTE — [facility] INPATIENT PSYCHIATRY PROGRESS NOTE - CURRENT MEDICATION
MEDICATIONS  (STANDING):  aspirin enteric coated 81 milliGRAM(s) Oral daily  dextrose 5%. 1000 milliLiter(s) (100 mL/Hr) IV Continuous <Continuous>  dextrose 5%. 1000 milliLiter(s) (50 mL/Hr) IV Continuous <Continuous>  dextrose 50% Injectable 25 Gram(s) IV Push once  dextrose 50% Injectable 12.5 Gram(s) IV Push once  dextrose 50% Injectable 25 Gram(s) IV Push once  diVALproex  milliGRAM(s) Oral at bedtime  glucagon  Injectable 1 milliGRAM(s) IntraMuscular once  insulin glargine Injectable (LANTUS) 20 Unit(s) SubCutaneous at bedtime  insulin lispro (ADMELOG) corrective regimen sliding scale   SubCutaneous three times a day before meals  insulin lispro (ADMELOG) corrective regimen sliding scale   SubCutaneous at bedtime  lithium SR (LITHOBID) 900 milliGRAM(s) Oral at bedtime  melatonin. 3 milliGRAM(s) Oral at bedtime  QUEtiapine 450 milliGRAM(s) Oral at bedtime  risperiDONE   Tablet 1 milliGRAM(s) Oral at bedtime  sodium bicarbonate 650 milliGRAM(s) Oral two times a day  tamsulosin 0.4 milliGRAM(s) Oral at bedtime    MEDICATIONS  (PRN):  acetaminophen     Tablet .. 650 milliGRAM(s) Oral every 6 hours PRN Temp greater or equal to 38C (100.4F), Mild Pain (1 - 3), Moderate Pain (4 - 6), Severe Pain (7 - 10)  bisacodyl 5 milliGRAM(s) Oral every 12 hours PRN Constipation  dextrose Oral Gel 15 Gram(s) Oral once PRN Blood Glucose LESS THAN 70 milliGRAM(s)/deciliter  OLANZapine Injectable 5 milliGRAM(s) IntraMuscular once PRN agitation  polyethylene glycol 3350 17 Gram(s) Oral daily PRN Constipation  QUEtiapine 25 milliGRAM(s) Oral every 6 hours PRN Anxiety/agitation  
MEDICATIONS  (STANDING):  aspirin enteric coated 81 milliGRAM(s) Oral daily  dextrose 5%. 1000 milliLiter(s) (50 mL/Hr) IV Continuous <Continuous>  dextrose 5%. 1000 milliLiter(s) (100 mL/Hr) IV Continuous <Continuous>  dextrose 50% Injectable 25 Gram(s) IV Push once  dextrose 50% Injectable 12.5 Gram(s) IV Push once  dextrose 50% Injectable 25 Gram(s) IV Push once  glucagon  Injectable 1 milliGRAM(s) IntraMuscular once  influenza  Vaccine (HIGH DOSE) 0.7 milliLiter(s) IntraMuscular once  insulin glargine Injectable (LANTUS) 20 Unit(s) SubCutaneous at bedtime  insulin lispro (ADMELOG) corrective regimen sliding scale   SubCutaneous three times a day before meals  insulin lispro (ADMELOG) corrective regimen sliding scale   SubCutaneous at bedtime  lithium SR (LITHOBID) 900 milliGRAM(s) Oral at bedtime  melatonin. 3 milliGRAM(s) Oral at bedtime  QUEtiapine 50 milliGRAM(s) Oral at bedtime  sodium bicarbonate 650 milliGRAM(s) Oral two times a day  tamsulosin 0.4 milliGRAM(s) Oral at bedtime    MEDICATIONS  (PRN):  acetaminophen     Tablet .. 650 milliGRAM(s) Oral every 6 hours PRN Temp greater or equal to 38C (100.4F), Mild Pain (1 - 3), Moderate Pain (4 - 6), Severe Pain (7 - 10)  bisacodyl 5 milliGRAM(s) Oral every 12 hours PRN Constipation  dextrose Oral Gel 15 Gram(s) Oral once PRN Blood Glucose LESS THAN 70 milliGRAM(s)/deciliter  OLANZapine Injectable 5 milliGRAM(s) IntraMuscular once PRN agitation  polyethylene glycol 3350 17 Gram(s) Oral daily PRN Constipation  QUEtiapine 25 milliGRAM(s) Oral every 6 hours PRN Anxiety/agitation  
MEDICATIONS  (STANDING):  aspirin enteric coated 81 milliGRAM(s) Oral daily  dextrose 5%. 1000 milliLiter(s) (100 mL/Hr) IV Continuous <Continuous>  dextrose 5%. 1000 milliLiter(s) (50 mL/Hr) IV Continuous <Continuous>  dextrose 50% Injectable 25 Gram(s) IV Push once  dextrose 50% Injectable 12.5 Gram(s) IV Push once  dextrose 50% Injectable 25 Gram(s) IV Push once  glucagon  Injectable 1 milliGRAM(s) IntraMuscular once  influenza  Vaccine (HIGH DOSE) 0.7 milliLiter(s) IntraMuscular once  insulin glargine Injectable (LANTUS) 20 Unit(s) SubCutaneous at bedtime  insulin lispro (ADMELOG) corrective regimen sliding scale   SubCutaneous three times a day before meals  insulin lispro (ADMELOG) corrective regimen sliding scale   SubCutaneous at bedtime  lithium SR (LITHOBID) 900 milliGRAM(s) Oral at bedtime  melatonin. 3 milliGRAM(s) Oral at bedtime  QUEtiapine 50 milliGRAM(s) Oral at bedtime  sodium bicarbonate 650 milliGRAM(s) Oral two times a day  tamsulosin 0.4 milliGRAM(s) Oral at bedtime    MEDICATIONS  (PRN):  acetaminophen     Tablet .. 650 milliGRAM(s) Oral every 6 hours PRN Temp greater or equal to 38C (100.4F), Mild Pain (1 - 3), Moderate Pain (4 - 6), Severe Pain (7 - 10)  bisacodyl 5 milliGRAM(s) Oral every 12 hours PRN Constipation  dextrose Oral Gel 15 Gram(s) Oral once PRN Blood Glucose LESS THAN 70 milliGRAM(s)/deciliter  OLANZapine Injectable 5 milliGRAM(s) IntraMuscular once PRN agitation  polyethylene glycol 3350 17 Gram(s) Oral daily PRN Constipation  QUEtiapine 25 milliGRAM(s) Oral every 6 hours PRN Anxiety/agitation  
MEDICATIONS  (STANDING):  aspirin enteric coated 81 milliGRAM(s) Oral daily  dextrose 5%. 1000 milliLiter(s) (50 mL/Hr) IV Continuous <Continuous>  dextrose 5%. 1000 milliLiter(s) (100 mL/Hr) IV Continuous <Continuous>  dextrose 50% Injectable 25 Gram(s) IV Push once  dextrose 50% Injectable 12.5 Gram(s) IV Push once  dextrose 50% Injectable 25 Gram(s) IV Push once  diVALproex  milliGRAM(s) Oral at bedtime  glucagon  Injectable 1 milliGRAM(s) IntraMuscular once  insulin glargine Injectable (LANTUS) 20 Unit(s) SubCutaneous at bedtime  insulin lispro (ADMELOG) corrective regimen sliding scale   SubCutaneous three times a day before meals  insulin lispro (ADMELOG) corrective regimen sliding scale   SubCutaneous at bedtime  lithium SR (LITHOBID) 900 milliGRAM(s) Oral at bedtime  melatonin. 3 milliGRAM(s) Oral at bedtime  QUEtiapine 450 milliGRAM(s) Oral at bedtime  sodium bicarbonate 650 milliGRAM(s) Oral two times a day  tamsulosin 0.4 milliGRAM(s) Oral at bedtime    MEDICATIONS  (PRN):  acetaminophen     Tablet .. 650 milliGRAM(s) Oral every 6 hours PRN Temp greater or equal to 38C (100.4F), Mild Pain (1 - 3), Moderate Pain (4 - 6), Severe Pain (7 - 10)  bisacodyl 5 milliGRAM(s) Oral every 12 hours PRN Constipation  dextrose Oral Gel 15 Gram(s) Oral once PRN Blood Glucose LESS THAN 70 milliGRAM(s)/deciliter  OLANZapine Injectable 5 milliGRAM(s) IntraMuscular once PRN agitation  polyethylene glycol 3350 17 Gram(s) Oral daily PRN Constipation  QUEtiapine 25 milliGRAM(s) Oral every 6 hours PRN Anxiety/agitation  
MEDICATIONS  (STANDING):  aspirin enteric coated 81 milliGRAM(s) Oral daily  dextrose 5%. 1000 milliLiter(s) (50 mL/Hr) IV Continuous <Continuous>  dextrose 5%. 1000 milliLiter(s) (100 mL/Hr) IV Continuous <Continuous>  dextrose 50% Injectable 25 Gram(s) IV Push once  dextrose 50% Injectable 12.5 Gram(s) IV Push once  dextrose 50% Injectable 25 Gram(s) IV Push once  glucagon  Injectable 1 milliGRAM(s) IntraMuscular once  influenza  Vaccine (HIGH DOSE) 0.7 milliLiter(s) IntraMuscular once  insulin glargine Injectable (LANTUS) 20 Unit(s) SubCutaneous at bedtime  insulin lispro (ADMELOG) corrective regimen sliding scale   SubCutaneous three times a day before meals  insulin lispro (ADMELOG) corrective regimen sliding scale   SubCutaneous at bedtime  lithium SR (LITHOBID) 900 milliGRAM(s) Oral at bedtime  melatonin. 3 milliGRAM(s) Oral at bedtime  QUEtiapine 50 milliGRAM(s) Oral at bedtime  sodium bicarbonate 650 milliGRAM(s) Oral two times a day  tamsulosin 0.4 milliGRAM(s) Oral at bedtime    MEDICATIONS  (PRN):  acetaminophen     Tablet .. 650 milliGRAM(s) Oral every 6 hours PRN Temp greater or equal to 38C (100.4F), Mild Pain (1 - 3), Moderate Pain (4 - 6), Severe Pain (7 - 10)  bisacodyl 5 milliGRAM(s) Oral every 12 hours PRN Constipation  dextrose Oral Gel 15 Gram(s) Oral once PRN Blood Glucose LESS THAN 70 milliGRAM(s)/deciliter  OLANZapine Injectable 5 milliGRAM(s) IntraMuscular once PRN agitation  polyethylene glycol 3350 17 Gram(s) Oral daily PRN Constipation  QUEtiapine 25 milliGRAM(s) Oral every 6 hours PRN Anxiety/agitation  
MEDICATIONS  (STANDING):  aspirin enteric coated 81 milliGRAM(s) Oral daily  dextrose 5%. 1000 milliLiter(s) (50 mL/Hr) IV Continuous <Continuous>  dextrose 5%. 1000 milliLiter(s) (100 mL/Hr) IV Continuous <Continuous>  dextrose 50% Injectable 25 Gram(s) IV Push once  dextrose 50% Injectable 12.5 Gram(s) IV Push once  dextrose 50% Injectable 25 Gram(s) IV Push once  glucagon  Injectable 1 milliGRAM(s) IntraMuscular once  insulin glargine Injectable (LANTUS) 20 Unit(s) SubCutaneous at bedtime  insulin lispro (ADMELOG) corrective regimen sliding scale   SubCutaneous three times a day before meals  insulin lispro (ADMELOG) corrective regimen sliding scale   SubCutaneous at bedtime  lithium SR (LITHOBID) 900 milliGRAM(s) Oral at bedtime  melatonin. 3 milliGRAM(s) Oral at bedtime  QUEtiapine 450 milliGRAM(s) Oral at bedtime  sodium bicarbonate 650 milliGRAM(s) Oral two times a day  tamsulosin 0.4 milliGRAM(s) Oral at bedtime    MEDICATIONS  (PRN):  acetaminophen     Tablet .. 650 milliGRAM(s) Oral every 6 hours PRN Temp greater or equal to 38C (100.4F), Mild Pain (1 - 3), Moderate Pain (4 - 6), Severe Pain (7 - 10)  bisacodyl 5 milliGRAM(s) Oral every 12 hours PRN Constipation  dextrose Oral Gel 15 Gram(s) Oral once PRN Blood Glucose LESS THAN 70 milliGRAM(s)/deciliter  OLANZapine Injectable 5 milliGRAM(s) IntraMuscular once PRN agitation  polyethylene glycol 3350 17 Gram(s) Oral daily PRN Constipation  QUEtiapine 25 milliGRAM(s) Oral every 6 hours PRN Anxiety/agitation  
MEDICATIONS  (STANDING):  aspirin enteric coated 81 milliGRAM(s) Oral daily  dextrose 5%. 1000 milliLiter(s) (50 mL/Hr) IV Continuous <Continuous>  dextrose 5%. 1000 milliLiter(s) (100 mL/Hr) IV Continuous <Continuous>  dextrose 50% Injectable 25 Gram(s) IV Push once  dextrose 50% Injectable 12.5 Gram(s) IV Push once  dextrose 50% Injectable 25 Gram(s) IV Push once  diVALproex  milliGRAM(s) Oral at bedtime  glucagon  Injectable 1 milliGRAM(s) IntraMuscular once  insulin glargine Injectable (LANTUS) 20 Unit(s) SubCutaneous at bedtime  insulin lispro (ADMELOG) corrective regimen sliding scale   SubCutaneous three times a day before meals  insulin lispro (ADMELOG) corrective regimen sliding scale   SubCutaneous at bedtime  lithium SR (LITHOBID) 900 milliGRAM(s) Oral at bedtime  melatonin. 3 milliGRAM(s) Oral at bedtime  QUEtiapine 450 milliGRAM(s) Oral at bedtime  sodium bicarbonate 650 milliGRAM(s) Oral two times a day  tamsulosin 0.4 milliGRAM(s) Oral at bedtime    MEDICATIONS  (PRN):  acetaminophen     Tablet .. 650 milliGRAM(s) Oral every 6 hours PRN Temp greater or equal to 38C (100.4F), Mild Pain (1 - 3), Moderate Pain (4 - 6), Severe Pain (7 - 10)  bisacodyl 5 milliGRAM(s) Oral every 12 hours PRN Constipation  dextrose Oral Gel 15 Gram(s) Oral once PRN Blood Glucose LESS THAN 70 milliGRAM(s)/deciliter  OLANZapine Injectable 5 milliGRAM(s) IntraMuscular once PRN agitation  polyethylene glycol 3350 17 Gram(s) Oral daily PRN Constipation  QUEtiapine 25 milliGRAM(s) Oral every 6 hours PRN Anxiety/agitation  
MEDICATIONS  (STANDING):  aspirin enteric coated 81 milliGRAM(s) Oral daily  dextrose 5%. 1000 milliLiter(s) (50 mL/Hr) IV Continuous <Continuous>  dextrose 5%. 1000 milliLiter(s) (100 mL/Hr) IV Continuous <Continuous>  dextrose 50% Injectable 25 Gram(s) IV Push once  dextrose 50% Injectable 12.5 Gram(s) IV Push once  dextrose 50% Injectable 25 Gram(s) IV Push once  diVALproex  milliGRAM(s) Oral at bedtime  glucagon  Injectable 1 milliGRAM(s) IntraMuscular once  influenza  Vaccine (HIGH DOSE) 0.7 milliLiter(s) IntraMuscular once  insulin glargine Injectable (LANTUS) 20 Unit(s) SubCutaneous at bedtime  insulin lispro (ADMELOG) corrective regimen sliding scale   SubCutaneous three times a day before meals  insulin lispro (ADMELOG) corrective regimen sliding scale   SubCutaneous at bedtime  lithium SR (LITHOBID) 900 milliGRAM(s) Oral at bedtime  melatonin. 3 milliGRAM(s) Oral at bedtime  QUEtiapine 300 milliGRAM(s) Oral at bedtime  risperiDONE   Tablet 2 milliGRAM(s) Oral at bedtime  sodium bicarbonate 650 milliGRAM(s) Oral two times a day  tamsulosin 0.4 milliGRAM(s) Oral at bedtime    MEDICATIONS  (PRN):  acetaminophen     Tablet .. 650 milliGRAM(s) Oral every 6 hours PRN Temp greater or equal to 38C (100.4F), Mild Pain (1 - 3), Moderate Pain (4 - 6), Severe Pain (7 - 10)  bisacodyl 5 milliGRAM(s) Oral every 12 hours PRN Constipation  dextrose Oral Gel 15 Gram(s) Oral once PRN Blood Glucose LESS THAN 70 milliGRAM(s)/deciliter  OLANZapine Injectable 5 milliGRAM(s) IntraMuscular once PRN agitation  polyethylene glycol 3350 17 Gram(s) Oral daily PRN Constipation  QUEtiapine 25 milliGRAM(s) Oral every 6 hours PRN Anxiety/agitation  
MEDICATIONS  (STANDING):  aspirin enteric coated 81 milliGRAM(s) Oral daily  dextrose 5%. 1000 milliLiter(s) (50 mL/Hr) IV Continuous <Continuous>  dextrose 5%. 1000 milliLiter(s) (100 mL/Hr) IV Continuous <Continuous>  dextrose 50% Injectable 25 Gram(s) IV Push once  dextrose 50% Injectable 12.5 Gram(s) IV Push once  dextrose 50% Injectable 25 Gram(s) IV Push once  diVALproex  milliGRAM(s) Oral at bedtime  glucagon  Injectable 1 milliGRAM(s) IntraMuscular once  influenza  Vaccine (HIGH DOSE) 0.7 milliLiter(s) IntraMuscular once  insulin glargine Injectable (LANTUS) 20 Unit(s) SubCutaneous at bedtime  insulin lispro (ADMELOG) corrective regimen sliding scale   SubCutaneous three times a day before meals  insulin lispro (ADMELOG) corrective regimen sliding scale   SubCutaneous at bedtime  lithium SR (LITHOBID) 900 milliGRAM(s) Oral at bedtime  melatonin. 3 milliGRAM(s) Oral at bedtime  QUEtiapine 300 milliGRAM(s) Oral at bedtime  risperiDONE   Tablet 2 milliGRAM(s) Oral at bedtime  sodium bicarbonate 650 milliGRAM(s) Oral two times a day  tamsulosin 0.4 milliGRAM(s) Oral at bedtime    MEDICATIONS  (PRN):  acetaminophen     Tablet .. 650 milliGRAM(s) Oral every 6 hours PRN Temp greater or equal to 38C (100.4F), Mild Pain (1 - 3), Moderate Pain (4 - 6), Severe Pain (7 - 10)  bisacodyl 5 milliGRAM(s) Oral every 12 hours PRN Constipation  dextrose Oral Gel 15 Gram(s) Oral once PRN Blood Glucose LESS THAN 70 milliGRAM(s)/deciliter  OLANZapine Injectable 5 milliGRAM(s) IntraMuscular once PRN agitation  polyethylene glycol 3350 17 Gram(s) Oral daily PRN Constipation  QUEtiapine 25 milliGRAM(s) Oral every 6 hours PRN Anxiety/agitation  
MEDICATIONS  (STANDING):  aspirin enteric coated 81 milliGRAM(s) Oral daily  dextrose 5%. 1000 milliLiter(s) (50 mL/Hr) IV Continuous <Continuous>  dextrose 5%. 1000 milliLiter(s) (100 mL/Hr) IV Continuous <Continuous>  dextrose 50% Injectable 25 Gram(s) IV Push once  dextrose 50% Injectable 12.5 Gram(s) IV Push once  dextrose 50% Injectable 25 Gram(s) IV Push once  glucagon  Injectable 1 milliGRAM(s) IntraMuscular once  insulin glargine Injectable (LANTUS) 20 Unit(s) SubCutaneous at bedtime  insulin lispro (ADMELOG) corrective regimen sliding scale   SubCutaneous three times a day before meals  insulin lispro (ADMELOG) corrective regimen sliding scale   SubCutaneous at bedtime  lithium SR (LITHOBID) 900 milliGRAM(s) Oral at bedtime  melatonin. 3 milliGRAM(s) Oral at bedtime  QUEtiapine 450 milliGRAM(s) Oral at bedtime  sodium bicarbonate 650 milliGRAM(s) Oral two times a day  tamsulosin 0.4 milliGRAM(s) Oral at bedtime    MEDICATIONS  (PRN):  acetaminophen     Tablet .. 650 milliGRAM(s) Oral every 6 hours PRN Temp greater or equal to 38C (100.4F), Mild Pain (1 - 3), Moderate Pain (4 - 6), Severe Pain (7 - 10)  bisacodyl 5 milliGRAM(s) Oral every 12 hours PRN Constipation  dextrose Oral Gel 15 Gram(s) Oral once PRN Blood Glucose LESS THAN 70 milliGRAM(s)/deciliter  OLANZapine Injectable 5 milliGRAM(s) IntraMuscular once PRN agitation  polyethylene glycol 3350 17 Gram(s) Oral daily PRN Constipation  QUEtiapine 25 milliGRAM(s) Oral every 6 hours PRN Anxiety/agitation  
MEDICATIONS  (STANDING):  aspirin enteric coated 81 milliGRAM(s) Oral daily  dextrose 5%. 1000 milliLiter(s) (50 mL/Hr) IV Continuous <Continuous>  dextrose 5%. 1000 milliLiter(s) (100 mL/Hr) IV Continuous <Continuous>  dextrose 50% Injectable 25 Gram(s) IV Push once  dextrose 50% Injectable 12.5 Gram(s) IV Push once  dextrose 50% Injectable 25 Gram(s) IV Push once  diVALproex  milliGRAM(s) Oral at bedtime  glucagon  Injectable 1 milliGRAM(s) IntraMuscular once  insulin glargine Injectable (LANTUS) 20 Unit(s) SubCutaneous at bedtime  insulin lispro (ADMELOG) corrective regimen sliding scale   SubCutaneous three times a day before meals  insulin lispro (ADMELOG) corrective regimen sliding scale   SubCutaneous at bedtime  lithium SR (LITHOBID) 900 milliGRAM(s) Oral at bedtime  melatonin. 3 milliGRAM(s) Oral at bedtime  QUEtiapine 450 milliGRAM(s) Oral at bedtime  sodium bicarbonate 650 milliGRAM(s) Oral two times a day  tamsulosin 0.4 milliGRAM(s) Oral at bedtime    MEDICATIONS  (PRN):  acetaminophen     Tablet .. 650 milliGRAM(s) Oral every 6 hours PRN Temp greater or equal to 38C (100.4F), Mild Pain (1 - 3), Moderate Pain (4 - 6), Severe Pain (7 - 10)  bisacodyl 5 milliGRAM(s) Oral every 12 hours PRN Constipation  dextrose Oral Gel 15 Gram(s) Oral once PRN Blood Glucose LESS THAN 70 milliGRAM(s)/deciliter  OLANZapine Injectable 5 milliGRAM(s) IntraMuscular once PRN agitation  polyethylene glycol 3350 17 Gram(s) Oral daily PRN Constipation  QUEtiapine 25 milliGRAM(s) Oral every 6 hours PRN Anxiety/agitation  
MEDICATIONS  (STANDING):  aspirin enteric coated 81 milliGRAM(s) Oral daily  dextrose 5%. 1000 milliLiter(s) (50 mL/Hr) IV Continuous <Continuous>  dextrose 5%. 1000 milliLiter(s) (100 mL/Hr) IV Continuous <Continuous>  dextrose 50% Injectable 25 Gram(s) IV Push once  dextrose 50% Injectable 12.5 Gram(s) IV Push once  dextrose 50% Injectable 25 Gram(s) IV Push once  glucagon  Injectable 1 milliGRAM(s) IntraMuscular once  insulin glargine Injectable (LANTUS) 20 Unit(s) SubCutaneous at bedtime  insulin lispro (ADMELOG) corrective regimen sliding scale   SubCutaneous three times a day before meals  insulin lispro (ADMELOG) corrective regimen sliding scale   SubCutaneous at bedtime  lithium SR (LITHOBID) 900 milliGRAM(s) Oral at bedtime  melatonin. 3 milliGRAM(s) Oral at bedtime  QUEtiapine 450 milliGRAM(s) Oral at bedtime  sodium bicarbonate 650 milliGRAM(s) Oral two times a day  tamsulosin 0.4 milliGRAM(s) Oral at bedtime    MEDICATIONS  (PRN):  acetaminophen     Tablet .. 650 milliGRAM(s) Oral every 6 hours PRN Temp greater or equal to 38C (100.4F), Mild Pain (1 - 3), Moderate Pain (4 - 6), Severe Pain (7 - 10)  bisacodyl 5 milliGRAM(s) Oral every 12 hours PRN Constipation  dextrose Oral Gel 15 Gram(s) Oral once PRN Blood Glucose LESS THAN 70 milliGRAM(s)/deciliter  OLANZapine Injectable 5 milliGRAM(s) IntraMuscular once PRN agitation  polyethylene glycol 3350 17 Gram(s) Oral daily PRN Constipation  QUEtiapine 25 milliGRAM(s) Oral every 6 hours PRN Anxiety/agitation  
MEDICATIONS  (STANDING):  aspirin enteric coated 81 milliGRAM(s) Oral daily  dextrose 5%. 1000 milliLiter(s) (100 mL/Hr) IV Continuous <Continuous>  dextrose 5%. 1000 milliLiter(s) (50 mL/Hr) IV Continuous <Continuous>  dextrose 50% Injectable 25 Gram(s) IV Push once  dextrose 50% Injectable 12.5 Gram(s) IV Push once  dextrose 50% Injectable 25 Gram(s) IV Push once  glucagon  Injectable 1 milliGRAM(s) IntraMuscular once  influenza  Vaccine (HIGH DOSE) 0.7 milliLiter(s) IntraMuscular once  insulin glargine Injectable (LANTUS) 20 Unit(s) SubCutaneous at bedtime  insulin lispro (ADMELOG) corrective regimen sliding scale   SubCutaneous three times a day before meals  insulin lispro (ADMELOG) corrective regimen sliding scale   SubCutaneous at bedtime  lithium SR (LITHOBID) 900 milliGRAM(s) Oral at bedtime  melatonin. 3 milliGRAM(s) Oral at bedtime  QUEtiapine 50 milliGRAM(s) Oral at bedtime  sodium bicarbonate 650 milliGRAM(s) Oral two times a day  tamsulosin 0.4 milliGRAM(s) Oral at bedtime    MEDICATIONS  (PRN):  acetaminophen     Tablet .. 650 milliGRAM(s) Oral every 6 hours PRN Temp greater or equal to 38C (100.4F), Mild Pain (1 - 3), Moderate Pain (4 - 6), Severe Pain (7 - 10)  bisacodyl 5 milliGRAM(s) Oral every 12 hours PRN Constipation  dextrose Oral Gel 15 Gram(s) Oral once PRN Blood Glucose LESS THAN 70 milliGRAM(s)/deciliter  OLANZapine Injectable 5 milliGRAM(s) IntraMuscular once PRN agitation  polyethylene glycol 3350 17 Gram(s) Oral daily PRN Constipation  QUEtiapine 25 milliGRAM(s) Oral every 6 hours PRN Anxiety/agitation  
MEDICATIONS  (STANDING):  aspirin enteric coated 81 milliGRAM(s) Oral daily  dextrose 5%. 1000 milliLiter(s) (50 mL/Hr) IV Continuous <Continuous>  dextrose 5%. 1000 milliLiter(s) (100 mL/Hr) IV Continuous <Continuous>  dextrose 50% Injectable 25 Gram(s) IV Push once  dextrose 50% Injectable 12.5 Gram(s) IV Push once  dextrose 50% Injectable 25 Gram(s) IV Push once  glucagon  Injectable 1 milliGRAM(s) IntraMuscular once  influenza  Vaccine (HIGH DOSE) 0.7 milliLiter(s) IntraMuscular once  insulin glargine Injectable (LANTUS) 20 Unit(s) SubCutaneous at bedtime  insulin lispro (ADMELOG) corrective regimen sliding scale   SubCutaneous three times a day before meals  insulin lispro (ADMELOG) corrective regimen sliding scale   SubCutaneous at bedtime  lithium SR (LITHOBID) 900 milliGRAM(s) Oral at bedtime  melatonin. 3 milliGRAM(s) Oral at bedtime  QUEtiapine 100 milliGRAM(s) Oral at bedtime  sodium bicarbonate 650 milliGRAM(s) Oral two times a day  tamsulosin 0.4 milliGRAM(s) Oral at bedtime    MEDICATIONS  (PRN):  acetaminophen     Tablet .. 650 milliGRAM(s) Oral every 6 hours PRN Temp greater or equal to 38C (100.4F), Mild Pain (1 - 3), Moderate Pain (4 - 6), Severe Pain (7 - 10)  bisacodyl 5 milliGRAM(s) Oral every 12 hours PRN Constipation  dextrose Oral Gel 15 Gram(s) Oral once PRN Blood Glucose LESS THAN 70 milliGRAM(s)/deciliter  OLANZapine Injectable 5 milliGRAM(s) IntraMuscular once PRN agitation  polyethylene glycol 3350 17 Gram(s) Oral daily PRN Constipation  QUEtiapine 25 milliGRAM(s) Oral every 6 hours PRN Anxiety/agitation  
MEDICATIONS  (STANDING):  aspirin enteric coated 81 milliGRAM(s) Oral daily  dextrose 5%. 1000 milliLiter(s) (50 mL/Hr) IV Continuous <Continuous>  dextrose 5%. 1000 milliLiter(s) (100 mL/Hr) IV Continuous <Continuous>  dextrose 50% Injectable 25 Gram(s) IV Push once  dextrose 50% Injectable 12.5 Gram(s) IV Push once  dextrose 50% Injectable 25 Gram(s) IV Push once  diVALproex  milliGRAM(s) Oral at bedtime  glucagon  Injectable 1 milliGRAM(s) IntraMuscular once  insulin glargine Injectable (LANTUS) 20 Unit(s) SubCutaneous at bedtime  insulin lispro (ADMELOG) corrective regimen sliding scale   SubCutaneous three times a day before meals  insulin lispro (ADMELOG) corrective regimen sliding scale   SubCutaneous at bedtime  lithium SR (LITHOBID) 900 milliGRAM(s) Oral at bedtime  melatonin. 3 milliGRAM(s) Oral at bedtime  QUEtiapine 450 milliGRAM(s) Oral at bedtime  risperiDONE   Tablet 0.5 milliGRAM(s) Oral at bedtime  sodium bicarbonate 650 milliGRAM(s) Oral two times a day  tamsulosin 0.4 milliGRAM(s) Oral at bedtime    MEDICATIONS  (PRN):  acetaminophen     Tablet .. 650 milliGRAM(s) Oral every 6 hours PRN Temp greater or equal to 38C (100.4F), Mild Pain (1 - 3), Moderate Pain (4 - 6), Severe Pain (7 - 10)  bisacodyl 5 milliGRAM(s) Oral every 12 hours PRN Constipation  dextrose Oral Gel 15 Gram(s) Oral once PRN Blood Glucose LESS THAN 70 milliGRAM(s)/deciliter  OLANZapine Injectable 5 milliGRAM(s) IntraMuscular once PRN agitation  polyethylene glycol 3350 17 Gram(s) Oral daily PRN Constipation  QUEtiapine 25 milliGRAM(s) Oral every 6 hours PRN Anxiety/agitation  
MEDICATIONS  (STANDING):  aspirin enteric coated 81 milliGRAM(s) Oral daily  dextrose 5%. 1000 milliLiter(s) (50 mL/Hr) IV Continuous <Continuous>  dextrose 5%. 1000 milliLiter(s) (100 mL/Hr) IV Continuous <Continuous>  dextrose 50% Injectable 25 Gram(s) IV Push once  dextrose 50% Injectable 12.5 Gram(s) IV Push once  dextrose 50% Injectable 25 Gram(s) IV Push once  glucagon  Injectable 1 milliGRAM(s) IntraMuscular once  influenza  Vaccine (HIGH DOSE) 0.7 milliLiter(s) IntraMuscular once  insulin glargine Injectable (LANTUS) 20 Unit(s) SubCutaneous at bedtime  insulin lispro (ADMELOG) corrective regimen sliding scale   SubCutaneous three times a day before meals  insulin lispro (ADMELOG) corrective regimen sliding scale   SubCutaneous at bedtime  lithium SR (LITHOBID) 900 milliGRAM(s) Oral at bedtime  melatonin. 3 milliGRAM(s) Oral at bedtime  QUEtiapine 300 milliGRAM(s) Oral at bedtime  risperiDONE   Tablet 3 milliGRAM(s) Oral at bedtime  sodium bicarbonate 650 milliGRAM(s) Oral two times a day  tamsulosin 0.4 milliGRAM(s) Oral at bedtime    MEDICATIONS  (PRN):  acetaminophen Tablet .. 650 milliGRAM(s) Oral every 6 hours PRN Temp greater or equal to 38C (100.4F), Mild Pain (1 - 3), Moderate Pain (4 - 6), Severe Pain (7 - 10)  bisacodyl 5 milliGRAM(s) Oral every 12 hours PRN Constipation  dextrose Oral Gel 15 Gram(s) Oral once PRN Blood Glucose LESS THAN 70 milliGRAM(s)/deciliter  OLANZapine Injectable 5 milliGRAM(s) IntraMuscular once PRN agitation  polyethylene glycol 3350 17 Gram(s) Oral daily PRN Constipation  QUEtiapine 25 milliGRAM(s) Oral every 6 hours PRN Anxiety/agitation  
MEDICATIONS  (STANDING):  aspirin enteric coated 81 milliGRAM(s) Oral daily  dextrose 5%. 1000 milliLiter(s) (50 mL/Hr) IV Continuous <Continuous>  dextrose 5%. 1000 milliLiter(s) (100 mL/Hr) IV Continuous <Continuous>  dextrose 50% Injectable 25 Gram(s) IV Push once  dextrose 50% Injectable 12.5 Gram(s) IV Push once  dextrose 50% Injectable 25 Gram(s) IV Push once  diVALproex  milliGRAM(s) Oral at bedtime  glucagon  Injectable 1 milliGRAM(s) IntraMuscular once  insulin glargine Injectable (LANTUS) 20 Unit(s) SubCutaneous at bedtime  insulin lispro (ADMELOG) corrective regimen sliding scale   SubCutaneous three times a day before meals  insulin lispro (ADMELOG) corrective regimen sliding scale   SubCutaneous at bedtime  lithium SR (LITHOBID) 900 milliGRAM(s) Oral at bedtime  melatonin. 3 milliGRAM(s) Oral at bedtime  QUEtiapine 450 milliGRAM(s) Oral at bedtime  sodium bicarbonate 650 milliGRAM(s) Oral two times a day  tamsulosin 0.4 milliGRAM(s) Oral at bedtime    MEDICATIONS  (PRN):  acetaminophen     Tablet .. 650 milliGRAM(s) Oral every 6 hours PRN Temp greater or equal to 38C (100.4F), Mild Pain (1 - 3), Moderate Pain (4 - 6), Severe Pain (7 - 10)  bisacodyl 5 milliGRAM(s) Oral every 12 hours PRN Constipation  dextrose Oral Gel 15 Gram(s) Oral once PRN Blood Glucose LESS THAN 70 milliGRAM(s)/deciliter  OLANZapine Injectable 5 milliGRAM(s) IntraMuscular once PRN agitation  polyethylene glycol 3350 17 Gram(s) Oral daily PRN Constipation  QUEtiapine 25 milliGRAM(s) Oral every 6 hours PRN Anxiety/agitation  
MEDICATIONS  (STANDING):  aspirin enteric coated 81 milliGRAM(s) Oral daily  dextrose 5%. 1000 milliLiter(s) (100 mL/Hr) IV Continuous <Continuous>  dextrose 5%. 1000 milliLiter(s) (50 mL/Hr) IV Continuous <Continuous>  dextrose 50% Injectable 25 Gram(s) IV Push once  dextrose 50% Injectable 12.5 Gram(s) IV Push once  dextrose 50% Injectable 25 Gram(s) IV Push once  diVALproex  milliGRAM(s) Oral at bedtime  glucagon  Injectable 1 milliGRAM(s) IntraMuscular once  influenza  Vaccine (HIGH DOSE) 0.7 milliLiter(s) IntraMuscular once  insulin glargine Injectable (LANTUS) 20 Unit(s) SubCutaneous at bedtime  insulin lispro (ADMELOG) corrective regimen sliding scale   SubCutaneous three times a day before meals  insulin lispro (ADMELOG) corrective regimen sliding scale   SubCutaneous at bedtime  lithium SR (LITHOBID) 900 milliGRAM(s) Oral at bedtime  melatonin. 3 milliGRAM(s) Oral at bedtime  QUEtiapine 300 milliGRAM(s) Oral at bedtime  risperiDONE   Tablet 2 milliGRAM(s) Oral at bedtime  sodium bicarbonate 650 milliGRAM(s) Oral two times a day  tamsulosin 0.4 milliGRAM(s) Oral at bedtime    MEDICATIONS  (PRN):  acetaminophen     Tablet .. 650 milliGRAM(s) Oral every 6 hours PRN Temp greater or equal to 38C (100.4F), Mild Pain (1 - 3), Moderate Pain (4 - 6), Severe Pain (7 - 10)  bisacodyl 5 milliGRAM(s) Oral every 12 hours PRN Constipation  dextrose Oral Gel 15 Gram(s) Oral once PRN Blood Glucose LESS THAN 70 milliGRAM(s)/deciliter  OLANZapine Injectable 5 milliGRAM(s) IntraMuscular once PRN agitation  polyethylene glycol 3350 17 Gram(s) Oral daily PRN Constipation  QUEtiapine 25 milliGRAM(s) Oral every 6 hours PRN Anxiety/agitation  
MEDICATIONS  (STANDING):  aspirin enteric coated 81 milliGRAM(s) Oral daily  dextrose 5%. 1000 milliLiter(s) (50 mL/Hr) IV Continuous <Continuous>  dextrose 5%. 1000 milliLiter(s) (100 mL/Hr) IV Continuous <Continuous>  dextrose 50% Injectable 25 Gram(s) IV Push once  dextrose 50% Injectable 12.5 Gram(s) IV Push once  dextrose 50% Injectable 25 Gram(s) IV Push once  diVALproex  milliGRAM(s) Oral at bedtime  glucagon  Injectable 1 milliGRAM(s) IntraMuscular once  insulin glargine Injectable (LANTUS) 20 Unit(s) SubCutaneous at bedtime  insulin lispro (ADMELOG) corrective regimen sliding scale   SubCutaneous three times a day before meals  insulin lispro (ADMELOG) corrective regimen sliding scale   SubCutaneous at bedtime  lithium SR (LITHOBID) 900 milliGRAM(s) Oral at bedtime  melatonin. 3 milliGRAM(s) Oral at bedtime  QUEtiapine 450 milliGRAM(s) Oral at bedtime  sodium bicarbonate 650 milliGRAM(s) Oral two times a day  tamsulosin 0.4 milliGRAM(s) Oral at bedtime    MEDICATIONS  (PRN):  acetaminophen     Tablet .. 650 milliGRAM(s) Oral every 6 hours PRN Temp greater or equal to 38C (100.4F), Mild Pain (1 - 3), Moderate Pain (4 - 6), Severe Pain (7 - 10)  bisacodyl 5 milliGRAM(s) Oral every 12 hours PRN Constipation  dextrose Oral Gel 15 Gram(s) Oral once PRN Blood Glucose LESS THAN 70 milliGRAM(s)/deciliter  OLANZapine Injectable 5 milliGRAM(s) IntraMuscular once PRN agitation  polyethylene glycol 3350 17 Gram(s) Oral daily PRN Constipation  QUEtiapine 25 milliGRAM(s) Oral every 6 hours PRN Anxiety/agitation  
MEDICATIONS  (STANDING):  aspirin enteric coated 81 milliGRAM(s) Oral daily  dextrose 5%. 1000 milliLiter(s) (50 mL/Hr) IV Continuous <Continuous>  dextrose 5%. 1000 milliLiter(s) (100 mL/Hr) IV Continuous <Continuous>  dextrose 50% Injectable 25 Gram(s) IV Push once  dextrose 50% Injectable 12.5 Gram(s) IV Push once  dextrose 50% Injectable 25 Gram(s) IV Push once  glucagon  Injectable 1 milliGRAM(s) IntraMuscular once  influenza  Vaccine (HIGH DOSE) 0.7 milliLiter(s) IntraMuscular once  insulin glargine Injectable (LANTUS) 20 Unit(s) SubCutaneous at bedtime  insulin lispro (ADMELOG) corrective regimen sliding scale   SubCutaneous three times a day before meals  insulin lispro (ADMELOG) corrective regimen sliding scale   SubCutaneous at bedtime  lithium SR (LITHOBID) 900 milliGRAM(s) Oral at bedtime  melatonin. 3 milliGRAM(s) Oral at bedtime  QUEtiapine 100 milliGRAM(s) Oral at bedtime  risperiDONE   Tablet 3 milliGRAM(s) Oral at bedtime  sodium bicarbonate 650 milliGRAM(s) Oral two times a day  tamsulosin 0.4 milliGRAM(s) Oral at bedtime    MEDICATIONS  (PRN):  acetaminophen     Tablet .. 650 milliGRAM(s) Oral every 6 hours PRN Temp greater or equal to 38C (100.4F), Mild Pain (1 - 3), Moderate Pain (4 - 6), Severe Pain (7 - 10)  bisacodyl 5 milliGRAM(s) Oral every 12 hours PRN Constipation  dextrose Oral Gel 15 Gram(s) Oral once PRN Blood Glucose LESS THAN 70 milliGRAM(s)/deciliter  OLANZapine Injectable 5 milliGRAM(s) IntraMuscular once PRN agitation  polyethylene glycol 3350 17 Gram(s) Oral daily PRN Constipation  QUEtiapine 25 milliGRAM(s) Oral every 6 hours PRN Anxiety/agitation  
MEDICATIONS  (STANDING):  aspirin enteric coated 81 milliGRAM(s) Oral daily  dextrose 5%. 1000 milliLiter(s) (50 mL/Hr) IV Continuous <Continuous>  dextrose 5%. 1000 milliLiter(s) (100 mL/Hr) IV Continuous <Continuous>  dextrose 50% Injectable 25 Gram(s) IV Push once  dextrose 50% Injectable 12.5 Gram(s) IV Push once  dextrose 50% Injectable 25 Gram(s) IV Push once  glucagon  Injectable 1 milliGRAM(s) IntraMuscular once  insulin glargine Injectable (LANTUS) 20 Unit(s) SubCutaneous at bedtime  insulin lispro (ADMELOG) corrective regimen sliding scale   SubCutaneous three times a day before meals  insulin lispro (ADMELOG) corrective regimen sliding scale   SubCutaneous at bedtime  lithium SR (LITHOBID) 900 milliGRAM(s) Oral at bedtime  melatonin. 3 milliGRAM(s) Oral at bedtime  QUEtiapine 450 milliGRAM(s) Oral at bedtime  sodium bicarbonate 650 milliGRAM(s) Oral two times a day  tamsulosin 0.4 milliGRAM(s) Oral at bedtime    MEDICATIONS  (PRN):  acetaminophen     Tablet .. 650 milliGRAM(s) Oral every 6 hours PRN Temp greater or equal to 38C (100.4F), Mild Pain (1 - 3), Moderate Pain (4 - 6), Severe Pain (7 - 10)  bisacodyl 5 milliGRAM(s) Oral every 12 hours PRN Constipation  dextrose Oral Gel 15 Gram(s) Oral once PRN Blood Glucose LESS THAN 70 milliGRAM(s)/deciliter  OLANZapine Injectable 5 milliGRAM(s) IntraMuscular once PRN agitation  polyethylene glycol 3350 17 Gram(s) Oral daily PRN Constipation  QUEtiapine 25 milliGRAM(s) Oral every 6 hours PRN Anxiety/agitation

## 2022-11-11 NOTE — BH INPATIENT PSYCHIATRY PROGRESS NOTE - NSTXDISORGDATENEW_PSY_ALL_CORE
08-Nov-2022

## 2022-11-11 NOTE — BH INPATIENT PSYCHIATRY PROGRESS NOTE - NSTXDISORGGOAL_PSY_ALL_CORE
Will identify 2 coping skills that assist in organizing
Will demonstrate related thoughts for 5 min in conversation
Will identify 2 coping skills that assist in organizing
Will demonstrate related thoughts for 5 min in conversation
Will identify 2 coping skills that assist in organizing
Will demonstrate purposeful and predictable thoughts/behaviors by making a request
Will identify 2 coping skills that assist in organizing
Will demonstrate related thoughts for 5 min in conversation
Will identify 2 coping skills that assist in organizing
Will identify 2 coping skills that assist in organizing
Will demonstrate purposeful and predictable thoughts/behaviors by making a request
Will demonstrate related thoughts for 5 min in conversation

## 2022-11-11 NOTE — BH INPATIENT PSYCHIATRY PROGRESS NOTE - NSCGISEVERILLNESS_PSY_ALL_CORE
5 = Markedly ill - intrusive symptoms that distinctly impair social/occupational function or cause intrusive levels of distress

## 2022-11-11 NOTE — BH INPATIENT PSYCHIATRY PROGRESS NOTE - NSBHFUPINTERVALHXFT_PSY_A_CORE
Patient seen for follow-up for daryl. Chart reviewed and case discussed with interdisciplinary team. No significant overnight event reported. On exam, patient reports that he feels "good" and is looking forward to leaving today. Patient requests a list of his current meds that the writer provides. No new concerns reported by patient. Patient denies any hallucinations. His appetite and sleep are adequate. The patient denies any thought of hurting self or others. His vitals are stable.

## 2022-11-11 NOTE — BH INPATIENT PSYCHIATRY PROGRESS NOTE - NSTXDCOPLKINTERMD_PSY_ALL_CORE
Patient will be discharged back to SNF 
Will coordinate discharge back to SNF with family
Patient will be discharged back to SNF

## 2022-11-11 NOTE — BH INPATIENT PSYCHIATRY PROGRESS NOTE - NSBHASSESSSUMMFT_PSY_ALL_CORE
67-year-old man currently residing at [facility]. PPH of Bipolar 1 Disorder. Hx of several inpatient admissions- last as few years ago at [place] per brother. Hx of multiple past suicide attempts- none recent. + history of aggressive behavior in the context of noncompliance with medication. PMH- DM2 (insulin dependent), constipation, generalized muscle weakness, BPH. Patient BIBA referred by SNF for agitation, admitted at [facility] initially and found to be Covid+ on 10/11, now admitted at [facility] for ongoing management of daryl.  Presents with grandiosity, elevated/irritable mood, FOI.     Patient transferred to [facility] from  on 10/26.     11/11 Clinical Update  No new concerns. Patient is looking forward to returning to residence today. Patient denies any thought, intent or plan to hurt self or others. Patient is stable and at his baseline as his daryl has largely resolved. Patient received his 2nd dose of Invega Sustenna 156mg IM today and tolerated it well.    Plan:  - Discharge back to [facility] today  - Will continue Invega Sustenna 156mg IM Q 4weeks  - Will continue Lithobid 900mg qhs for daryl  - Will continue melatonin 3mg Oral at bedtime for sleep  - Will continue quetiapine 50mg at bedtime for mood stabilization  - Will continue medications for medical comorbidities-- COVID-19: Asymptomatic, will continue to monitor; DM2: Lantus 20mg qHS; ISS: sodium bicarbonate 650mg BID; BPH: tamsulosin 0.4mg at bedtime; aspirin enteric coated 81mg daily

## 2022-11-11 NOTE — BH INPATIENT PSYCHIATRY PROGRESS NOTE - NSICDXBHSECONDARYDX_PSY_ALL_CORE
2019 novel coronavirus disease (COVID-19)   U07.1  DM (diabetes mellitus)   E11.9  BPH (benign prostatic hyperplasia)   N40.0  
2019 novel coronavirus disease (COVID-19)   U07.1  
2019 novel coronavirus disease (COVID-19)   U07.1  
2019 novel coronavirus disease (COVID-19)   U07.1  DM (diabetes mellitus)   E11.9  BPH (benign prostatic hyperplasia)   N40.0  
2019 novel coronavirus disease (COVID-19)   U07.1  
2019 novel coronavirus disease (COVID-19)   U07.1  
2019 novel coronavirus disease (COVID-19)   U07.1  DM (diabetes mellitus)   E11.9  BPH (benign prostatic hyperplasia)   N40.0  
2019 novel coronavirus disease (COVID-19)   U07.1  DM (diabetes mellitus)   E11.9  BPH (benign prostatic hyperplasia)   N40.0  
2019 novel coronavirus disease (COVID-19)   U07.1  
2019 novel coronavirus disease (COVID-19)   U07.1  
2019 novel coronavirus disease (COVID-19)   U07.1  DM (diabetes mellitus)   E11.9  BPH (benign prostatic hyperplasia)   N40.0  
2019 novel coronavirus disease (COVID-19)   U07.1

## 2022-11-11 NOTE — BH INPATIENT PSYCHIATRY PROGRESS NOTE - NSTXDISORGPROGRES_PSY_ALL_CORE
No Change
Improving
No Change
Met - goal discontinued
No Change
Improving
No Change
No Change
Improving
Improving
No Change
Met - goal discontinued
No Change
No Change
Met - goal discontinued

## 2022-11-11 NOTE — BH INPATIENT PSYCHIATRY PROGRESS NOTE - NSTXDISORGDATEEST_PSY_ALL_CORE
20-Oct-2022
19-Oct-2022
26-Oct-2022
20-Oct-2022
19-Oct-2022
20-Oct-2022
19-Oct-2022
20-Oct-2022
20-Oct-2022
19-Oct-2022

## 2022-11-11 NOTE — BH INPATIENT PSYCHIATRY PROGRESS NOTE - MSE OPTIONS
Unstructured MSE
Structured MSE
Unstructured MSE

## 2022-11-11 NOTE — BH INPATIENT PSYCHIATRY PROGRESS NOTE - NSBHMETABOLIC_PSY_ALL_CORE_FT
BMI: BMI (kg/m2): 37.1 (10-29-22 @ 16:08)  HbA1c: A1C with Estimated Average Glucose Result: 5.7 % (10-08-22 @ 10:20)  Glucose: POCT Blood Glucose.: 137 mg/dL (11-11-22 @ 11:56)  BP: 126/80 (11-10-22 @ 20:30) (126/80 - 126/80)  Lipid Panel: Date/Time: 10-08-22 @ 10:20  Cholesterol, Serum: 151  HDL Cholesterol, Serum: 54  Triglycerides, Serum: 73

## 2022-11-11 NOTE — BH INPATIENT PSYCHIATRY PROGRESS NOTE - PRN MEDS
MEDICATIONS  (PRN):  acetaminophen     Tablet .. 650 milliGRAM(s) Oral every 6 hours PRN Temp greater or equal to 38C (100.4F), Mild Pain (1 - 3), Moderate Pain (4 - 6), Severe Pain (7 - 10)  bisacodyl 5 milliGRAM(s) Oral every 12 hours PRN Constipation  dextrose Oral Gel 15 Gram(s) Oral once PRN Blood Glucose LESS THAN 70 milliGRAM(s)/deciliter  OLANZapine Injectable 5 milliGRAM(s) IntraMuscular once PRN agitation  polyethylene glycol 3350 17 Gram(s) Oral daily PRN Constipation  QUEtiapine 25 milliGRAM(s) Oral every 6 hours PRN Anxiety/agitation  

## 2022-11-11 NOTE — BH INPATIENT PSYCHIATRY PROGRESS NOTE - MSE UNSTRUCTURED FT
Patient appears stated age, is overweight, with some improvement in grooming and hygiene. He is cooperative with interview questions with fair eye contact. No abnormal movements appreciated. No psychomotor agitation or retardation noted. Steady gait observed. Speech is fluent, spontaneous. Reported mood "good" with constricted but stable affect. Thought Process is linear today. No delusions reported today. Patient denies SI/HI. No perceptual disturbances. Insight and judgement are improving. Impulse control intact at the time of exam. Grossly oriented in all spheres.

## 2022-11-11 NOTE — BH INPATIENT PSYCHIATRY PROGRESS NOTE - NSTXDCOPLKDATETRGT_PSY_ALL_CORE
26-Oct-2022
27-Oct-2022
26-Oct-2022
26-Oct-2022
10-Nov-2022
27-Oct-2022
15-Nov-2022
27-Oct-2022
26-Oct-2022
27-Oct-2022
27-Oct-2022
10-Nov-2022
26-Oct-2022
27-Oct-2022
27-Oct-2022
15-Nov-2022
15-Nov-2022
26-Oct-2022
10-Nov-2022

## 2022-11-11 NOTE — BH INPATIENT PSYCHIATRY PROGRESS NOTE - NSTXDISORGINTERMD_PSY_ALL_CORE
Continued management with risperidone with the aim to convert to WTATS
Continued management with risperidone with the aim to convert to WATTS
Continued management with Q 4 weeks WATTS Invega Sustenna
Continued management with risperidone with the aim to convert to WATTS
Continued management with Q 4 weeks WATTS Invega Sustenna
Continued management with risperidone with the aim to convert to WATTS
Continued management with risperidone with the aim to convert to WATTS

## 2022-11-11 NOTE — BH INPATIENT PSYCHIATRY PROGRESS NOTE - NSBHATTESTTYPEVISIT_PSY_A_CORE
Attending Only
Attending with Resident/Fellow/Student
Attending Only
Attending with Resident/Fellow/Student
Attending Only

## 2022-11-11 NOTE — BH INPATIENT PSYCHIATRY PROGRESS NOTE - NSTXDCOPLKPROGRES_PSY_ALL_CORE
Improving
No Change
Improving
No Change
Improving
No Change
Improving
Improving
No Change
Improving
Improving
No Change
Improving

## 2022-11-11 NOTE — BH INPATIENT PSYCHIATRY PROGRESS NOTE - NSBHCHARTREVIEWLAB_PSY_A_CORE FT
10-21    133<L>  |  101  |  25<H>  ----------------------------<  246<H>  4.9   |  24  |  0.90  

## 2022-11-11 NOTE — BH INPATIENT PSYCHIATRY PROGRESS NOTE - NSTXDISORGDATETRGT_PSY_ALL_CORE
03-Nov-2022
31-Oct-2022
09-Nov-2022
03-Nov-2022
03-Nov-2022
31-Oct-2022
01-Nov-2022
16-Nov-2022
31-Oct-2022
03-Nov-2022
31-Oct-2022
11-Nov-2022
31-Oct-2022
09-Nov-2022
03-Nov-2022
16-Nov-2022
31-Oct-2022
03-Nov-2022
03-Nov-2022

## 2022-11-11 NOTE — BH INPATIENT PSYCHIATRY PROGRESS NOTE - NSCGIIMPROVESX_PSY_ALL_CORE
4 = No change - symptoms remain essentially unchanged
3 = Minimally improved - slightly better with little or no clinically meaningful reduction of symptoms.  Represents very little change in basic clinical status, level of care, or functional capacity.
4 = No change - symptoms remain essentially unchanged
4 = No change - symptoms remain essentially unchanged
3 = Minimally improved - slightly better with little or no clinically meaningful reduction of symptoms.  Represents very little change in basic clinical status, level of care, or functional capacity.
3 = Minimally improved - slightly better with little or no clinically meaningful reduction of symptoms.  Represents very little change in basic clinical status, level of care, or functional capacity.
4 = No change - symptoms remain essentially unchanged
2 = Much improved - notably better with signficant reduction of symptoms; increase in the level of functioning but some symptoms remain
2 = Much improved - notably better with signficant reduction of symptoms; increase in the level of functioning but some symptoms remain
3 = Minimally improved - slightly better with little or no clinically meaningful reduction of symptoms.  Represents very little change in basic clinical status, level of care, or functional capacity.
3 = Minimally improved - slightly better with little or no clinically meaningful reduction of symptoms.  Represents very little change in basic clinical status, level of care, or functional capacity.
4 = No change - symptoms remain essentially unchanged
3 = Minimally improved - slightly better with little or no clinically meaningful reduction of symptoms.  Represents very little change in basic clinical status, level of care, or functional capacity.
3 = Minimally improved - slightly better with little or no clinically meaningful reduction of symptoms.  Represents very little change in basic clinical status, level of care, or functional capacity.
4 = No change - symptoms remain essentially unchanged
2 = Much improved - notably better with signficant reduction of symptoms; increase in the level of functioning but some symptoms remain
3 = Minimally improved - slightly better with little or no clinically meaningful reduction of symptoms.  Represents very little change in basic clinical status, level of care, or functional capacity.

## 2022-11-11 NOTE — BH INPATIENT PSYCHIATRY PROGRESS NOTE - NSICDXBHPRIMARYDX_PSY_ALL_CORE
Bipolar affective disorder, current episode manic   F31.10  

## 2022-12-21 NOTE — PROGRESS NOTE ADULT - PROBLEM SELECTOR PROBLEM 4
Bipolar 1 disorder
Type 2 diabetes mellitus, with long-term current use of insulin
Bipolar 1 disorder
Type 2 diabetes mellitus, with long-term current use of insulin
Bipolar 1 disorder
21-Dec-2022 11:00

## 2023-01-01 NOTE — DIETITIAN INITIAL EVALUATION ADULT - PERSON TAUGHT/METHOD
verbal instruction/patient instructed Orientation to room/Bed in low position, brakes on/Side rails x 2 or 4 up, assess large gaps, such that a patient could get extremity or other body part entrapped, use additional safety procedures/Use of non-skid footwear for ambulating patients, use of appropriate size clothing to prevent risk of tripping/Assess eliminations need, assist as needed/Call light is within reach, educate patient/family on its functionality/Environment clear of unused equipment, furniture's in place, clear of hazards/Assess for adequate lighting, leave nightlight on/Patient and family education available to parents and patient/Document fall prevention teaching and include in plan of care/Educate patient/parents of falls protocol precautions/Check patient minimum every 1 hour/Keep bed in the lowest position, unless patient is directly attended/Document in nursing narrative teaching and plan of care

## 2023-02-21 NOTE — ED ADULT NURSE NOTE - PLAN OF CARE
Acute on chronic cough for the past 2 days, productive of green purulent sputum.  Today's visit was telehealth, so I am going to ask her to take a home rapid COVID test.  She does state that she has these available.  Of concern, CT chest done last week showed multiple bilateral groundglass nodules, concerning for possible inflammation versus infection (malignancy also a possibility, to which end PET scan was ordered as recommended by Radiology).  If her COVID test comes back negative today, then we will plan to treat with a 7-day course of Levaquin (as below).  If COVID test comes back positive, she will not be a candidate for Paxlovid.  
Acute on chronic cough for the past 2 days, productive of green purulent sputum.  Today's visit was telehealth, so I am going to ask her to take a home rapid COVID test.  This did come back POSITIVE.    Symptoms for the past 2 days.  Home test positive for COVID. GFR 53.8.  Med interactions include:  atorvastatin.     Luisa does qualify for Paxlovid.  Paxlovid is approved under EUA or emergency use authorization by the FDA for COVID.  This means that it has not fully completed all of the approval processes but because of the excellent effects that has shown in treating people who have COVID, they have chosen to make it available before the final processes have been cleared.  It does a very good job of keeping people out of the hospital if they are not already sick enough to be admitted.  She does qualify based on risk factors including:  age.  She will take 2 tabs twice daily for 5 days.  Rx will be sent to [facility] pharmacy.  Symptomatic care recommended with OTC analgesics for pain/fever, OTC decongestants and cough suppressants prn.  Stay well hydrated.  Humidifier in the bedroom at night.  Hot tea with lemon and honey. According to CDC guidelines, she should quarantine for until a total of 5 days since symptoms began have passed AND she has gone 24 hours being fever-free without medications AND her other symptoms are improving.  If all of these criteria are met, then she may come out of quarantine as long as she wears a well-fitting mask in public for another 5 days.  She should contact us or return to clinic if her sx worsen, especially if she develops chest pain, shortness of breath, fevers that do not respond to medications, uncontrollable vomiting, or significant swelling and/or pain in one leg.     
Position of comfort/Fall precautions/Side rails/Explanation of exam/test

## 2023-07-17 NOTE — BH CONSULTATION LIAISON PROGRESS NOTE - NSBHATTESTTYPEVISIT_PSY_A_CORE
Attending Only
Oriented - self; Oriented - place; Oriented - time
Attending Only
Attending with Resident/Fellow/Student
On-site Attending supervising KASSIDY (99XXX codes)
Attending with Resident/Fellow/Student
Resident/Fellow with telephonic supervision
Attending with Resident/Fellow/Student

## 2024-06-10 NOTE — ED BEHAVIORAL HEALTH ASSESSMENT NOTE - ACCOMPANIED BY
Patient Specific Otc Recommendations (Will Not Stick From Patient To Patient): Panoxyl wash
Detail Level: Zone
Family member

## 2024-09-19 NOTE — ED ADULT NURSE NOTE - NSWEIGHTCALCTOOLDRUG_GEN_A_CORE
Apheresis Blood Donor Center Post Instructions  You may feel tired after your procedure today.   Please call your doctor if you have:  bleeding that doesn t stop, fever, pain where a needle or tube (catheter) was placed, seizures, trouble breathing, red urine, nausea or vomiting, other health concerns.     If your symptoms are severe, call [phone].  You have a Central Venous Catheter:  Notify your doctor if you have had a fever, chills, shaking  or redness, warmth, swelling, drainage at the exit-site.  This could be a sign of infection.    The Apheresis/Blood Donor Center is open Monday-Friday 7:30 a.m. to 5 p.m.  The phone number is [phone].  A Transfusion Medicine physician can be reached after 5:00 p.m. weekdays and on weekends /Holidays by calling [phone], and asking for the physician on call.      Plasma exchange:  You received blood products (plasma) as part of your treatment, you need to be aware that transfusion reactions can occur up to several hours after they have been given to you.  Call your physician if you experience any symptoms in the next 48 hours, including: breathing problems, rash, itching, hives, nausea or vomiting, fever or chills, blood in your urine or stools, or joint pain.  Please inform the Transfusion Medicine Physician by calling [phone] and asking for the physician on call.  You also received albumin as part of your treatment (this is the most common), some of your clotting factors have been removed.  You body will replace these factors, but you could be at a slight risk for bleeding.  Please inform us if you have had any bleeding or a recent invasive procedure, such as a biopsy or surgery.    Certain medications that lower your blood pressure (ace inhibitors) such as Lisinopril are contraindicated while you are receiving plasma exchange.  Please inform us if you have started taking this medication during your plasma exchange series.    used

## 2024-09-25 NOTE — ED ADULT NURSE NOTE - PRO INTERPRETER NEED 2
English 70 y/o male with known right hip fracture (fall 2 weeks ago) sent in from [facility] for repeat xray imaging. Per patient MD at NH, patient was sent in for xray imaging for planned ortho visit tomorrow. No new falls or new complaints. NH unable to obtain xray at NH before visit. ROS otherwise neg.

## 2024-09-28 NOTE — DISCHARGE NOTE ADULT - MEDICATION SUMMARY - MEDICATIONS TO CHANGE
I will SWITCH the dose or number of times a day I take the medications listed below when I get home from the hospital:  None
Shailesh Pederson

## 2025-06-03 NOTE — ED ADULT NURSE NOTE - CARDIO ASSESSMENT
----- Message from Griselda LOPEZ MA sent at 6/3/2025 11:17 AM CDT -----  Spoke with pharmacy, [facility] does not have a record or her script, states that their system was down for a little bit yesterday.   ---